# Patient Record
Sex: MALE | Race: BLACK OR AFRICAN AMERICAN | NOT HISPANIC OR LATINO | Employment: OTHER | URBAN - METROPOLITAN AREA
[De-identification: names, ages, dates, MRNs, and addresses within clinical notes are randomized per-mention and may not be internally consistent; named-entity substitution may affect disease eponyms.]

---

## 2017-05-11 DIAGNOSIS — M54.50 CHRONIC MIDLINE LOW BACK PAIN WITHOUT SCIATICA: Primary | ICD-10-CM

## 2017-05-11 DIAGNOSIS — G89.29 CHRONIC MIDLINE LOW BACK PAIN WITHOUT SCIATICA: Primary | ICD-10-CM

## 2017-05-11 RX ORDER — IBUPROFEN 800 MG/1
800 TABLET ORAL DAILY PRN
Qty: 90 TABLET | Refills: 1 | Status: SHIPPED | OUTPATIENT
Start: 2017-05-11 | End: 2017-05-11

## 2017-05-11 RX ORDER — IBUPROFEN 800 MG/1
800 TABLET ORAL DAILY PRN
COMMUNITY
Start: 2016-12-02 | End: 2017-05-11 | Stop reason: SDUPTHER

## 2017-05-11 RX ORDER — IBUPROFEN 800 MG/1
800 TABLET ORAL DAILY PRN
Qty: 90 TABLET | Refills: 1 | Status: SHIPPED | OUTPATIENT
Start: 2017-05-11 | End: 2017-05-26 | Stop reason: SDUPTHER

## 2017-05-26 RX ORDER — METFORMIN HYDROCHLORIDE 500 MG/1
1 TABLET ORAL DAILY
COMMUNITY
Start: 2016-12-02 | End: 2017-06-11 | Stop reason: SDUPTHER

## 2017-05-26 RX ORDER — LORATADINE 10 MG/1
1 TABLET ORAL DAILY
COMMUNITY
Start: 2016-12-02 | End: 2017-11-16 | Stop reason: SDUPTHER

## 2017-05-26 RX ORDER — ASPIRIN 81 MG/1
1 TABLET ORAL DAILY
COMMUNITY
Start: 2016-08-02

## 2017-05-26 RX ORDER — LISINOPRIL 20 MG/1
1 TABLET ORAL DAILY
COMMUNITY
Start: 2016-12-02 | End: 2017-06-11 | Stop reason: SDUPTHER

## 2017-05-26 RX ORDER — ATORVASTATIN CALCIUM 10 MG/1
1 TABLET, FILM COATED ORAL DAILY
COMMUNITY
Start: 2016-12-02 | End: 2017-06-11 | Stop reason: SDUPTHER

## 2017-05-26 RX ORDER — HYDROGEN PEROXIDE 3 %
1 SOLUTION, NON-ORAL MISCELLANEOUS DAILY
COMMUNITY
Start: 2016-12-02 | End: 2017-06-11 | Stop reason: SDUPTHER

## 2017-05-26 RX ORDER — NIFEDIPINE 90 MG/1
1 TABLET, FILM COATED, EXTENDED RELEASE ORAL DAILY
COMMUNITY
Start: 2016-12-02 | End: 2017-06-11 | Stop reason: SDUPTHER

## 2017-05-26 RX ORDER — IBUPROFEN 800 MG/1
1 TABLET ORAL DAILY PRN
COMMUNITY
Start: 2016-12-02 | End: 2017-11-07 | Stop reason: SDUPTHER

## 2017-05-26 RX ORDER — SILDENAFIL 100 MG/1
1 TABLET, FILM COATED ORAL DAILY PRN
COMMUNITY
Start: 2016-08-02 | End: 2017-07-06 | Stop reason: SDUPTHER

## 2017-06-11 RX ORDER — METFORMIN HYDROCHLORIDE 500 MG/1
TABLET ORAL
Qty: 90 TABLET | Refills: 2 | Status: SHIPPED | OUTPATIENT
Start: 2017-06-11 | End: 2018-03-09 | Stop reason: SDUPTHER

## 2017-06-11 RX ORDER — NIFEDIPINE 90 MG/1
TABLET, FILM COATED, EXTENDED RELEASE ORAL
Qty: 90 TABLET | Refills: 2 | Status: SHIPPED | OUTPATIENT
Start: 2017-06-11 | End: 2018-03-09 | Stop reason: SDUPTHER

## 2017-06-11 RX ORDER — LISINOPRIL 20 MG/1
TABLET ORAL
Qty: 90 TABLET | Refills: 2 | Status: SHIPPED | OUTPATIENT
Start: 2017-06-11 | End: 2018-03-09 | Stop reason: SDUPTHER

## 2017-06-11 RX ORDER — ATORVASTATIN CALCIUM 10 MG/1
TABLET, FILM COATED ORAL
Qty: 90 TABLET | Refills: 2 | Status: SHIPPED | OUTPATIENT
Start: 2017-06-11 | End: 2018-03-09 | Stop reason: SDUPTHER

## 2017-06-11 RX ORDER — HYDROGEN PEROXIDE 3 %
20 SOLUTION, NON-ORAL MISCELLANEOUS DAILY
Qty: 90 CAPSULE | Refills: 3 | Status: SHIPPED | OUTPATIENT
Start: 2017-06-11 | End: 2017-09-15 | Stop reason: CLARIF

## 2017-06-11 RX ORDER — ESOMEPRAZOLE MAGNESIUM 20 MG
CAPSULE,DELAYED RELEASE (ENTERIC COATED) ORAL
Qty: 90 CAPSULE | Refills: 2 | Status: CANCELLED | OUTPATIENT
Start: 2017-06-11

## 2017-06-29 LAB — HBA1C MFR BLD: 6.1 % (ref 3.1–6.5)

## 2017-07-04 PROBLEM — N52.9 ED (ERECTILE DYSFUNCTION) OF ORGANIC ORIGIN: Status: ACTIVE | Noted: 2017-07-04

## 2017-07-04 PROBLEM — R73.01 IMPAIRED FASTING GLUCOSE: Status: ACTIVE | Noted: 2017-07-04

## 2017-07-04 PROBLEM — G89.29 CHRONIC LOW BACK PAIN: Status: ACTIVE | Noted: 2017-07-04

## 2017-07-04 PROBLEM — E78.00 HYPERCHOLESTEROLEMIA: Status: ACTIVE | Noted: 2017-07-04

## 2017-07-04 PROBLEM — M54.50 CHRONIC LOW BACK PAIN: Status: ACTIVE | Noted: 2017-07-04

## 2017-07-04 PROBLEM — R68.82 REDUCED LIBIDO: Status: ACTIVE | Noted: 2017-07-04

## 2017-07-04 PROBLEM — E66.3 OVERWEIGHT: Status: ACTIVE | Noted: 2017-07-04

## 2017-07-04 PROBLEM — K21.9 GASTROESOPHAGEAL REFLUX DISEASE WITHOUT ESOPHAGITIS: Status: ACTIVE | Noted: 2017-07-04

## 2017-07-04 PROBLEM — I10 BENIGN ESSENTIAL HYPERTENSION: Status: ACTIVE | Noted: 2017-07-04

## 2017-07-04 PROBLEM — J30.1 HAY FEVER: Status: ACTIVE | Noted: 2017-07-04

## 2017-07-04 PROBLEM — F17.200 CURRENT SMOKER: Status: ACTIVE | Noted: 2017-07-04

## 2017-07-04 PROBLEM — Z13.89 ENCOUNTER FOR SCREENING FOR OTHER DISORDER: Status: ACTIVE | Noted: 2017-07-04

## 2017-07-04 PROBLEM — M51.369 DEGENERATION OF INTERVERTEBRAL DISC OF LUMBAR REGION: Status: ACTIVE | Noted: 2017-07-04

## 2017-07-04 PROBLEM — M51.36 DEGENERATION OF INTERVERTEBRAL DISC OF LUMBAR REGION: Status: ACTIVE | Noted: 2017-07-04

## 2017-07-06 ENCOUNTER — OFFICE VISIT (OUTPATIENT)
Dept: FAMILY MEDICINE | Facility: CLINIC | Age: 62
End: 2017-07-06
Payer: OTHER GOVERNMENT

## 2017-07-06 VITALS
DIASTOLIC BLOOD PRESSURE: 80 MMHG | HEIGHT: 72 IN | SYSTOLIC BLOOD PRESSURE: 121 MMHG | WEIGHT: 219 LBS | HEART RATE: 91 BPM | BODY MASS INDEX: 29.66 KG/M2

## 2017-07-06 DIAGNOSIS — I10 BENIGN ESSENTIAL HYPERTENSION: ICD-10-CM

## 2017-07-06 DIAGNOSIS — E78.00 HYPERCHOLESTEROLEMIA: ICD-10-CM

## 2017-07-06 DIAGNOSIS — Z29.89 NEED FOR MALARIA PROPHYLAXIS: ICD-10-CM

## 2017-07-06 DIAGNOSIS — N52.9 ERECTILE DYSFUNCTION, UNSPECIFIED ERECTILE DYSFUNCTION TYPE: ICD-10-CM

## 2017-07-06 DIAGNOSIS — Z71.85 IMMUNIZATION COUNSELING: Primary | ICD-10-CM

## 2017-07-06 PROCEDURE — 99214 OFFICE O/P EST MOD 30 MIN: CPT | Mod: ,,, | Performed by: INTERNAL MEDICINE

## 2017-07-06 RX ORDER — DOXYCYCLINE 100 MG/1
100 CAPSULE ORAL DAILY
Qty: 37 CAPSULE | Refills: 0 | Status: SHIPPED | OUTPATIENT
Start: 2017-07-06 | End: 2018-05-08

## 2017-07-06 RX ORDER — SILDENAFIL 100 MG/1
100 TABLET, FILM COATED ORAL DAILY PRN
Qty: 24 TABLET | Refills: 3 | Status: SHIPPED | OUTPATIENT
Start: 2017-07-06 | End: 2018-09-19 | Stop reason: SDUPTHER

## 2017-07-06 RX ORDER — ATOVAQUONE AND PROGUANIL HYDROCHLORIDE 250; 100 MG/1; MG/1
1 TABLET, FILM COATED ORAL DAILY
Qty: 16 TABLET | Refills: 0 | Status: SHIPPED | OUTPATIENT
Start: 2017-07-06 | End: 2018-09-23

## 2017-07-06 NOTE — PATIENT INSTRUCTIONS
Taking Your Blood Pressure  Blood pressure is the force of blood against the artery wall as it moves from the heart through the blood vessels. You can take your own blood pressure reading using a digital monitor. Take readings as often as your healthcare provider instructs. Take each reading at the same time of day.  Step 1. Relax    · Take your blood pressure at the same time every day, such as in the morning or evening, or at the time your healthcare provider recommends.  · Wait at least a half-hour after smoking, eating, drinking caffeinated beverages, or exercising.  · Sit comfortably at a table with both feet on the floor. Do not cross your legs or feet. Place the monitor near you.  · Rest for a few minutes before you begin.  Step 2. Wrap the cuff    · Place your arm on the table, palm up. Your arm should be at the level of your heart. Wrap the cuff around your upper arm, just above your elbow. Its best done on bare skin, not over clothing. Most cuffs will indicate where the brachial artery (the blood vessel in the middle of the arm at the inner side of the elbow) should line up with the cuff. Look in your monitor's instruction booklet for an illustration. You can also bring your cuff to your healthcare provider and have them show you how to correctly place the cuff.  · Make sure your cuff fits. If it doesnt wrap around your upper arm, order a larger cuff.  Step 3. Inflate the cuff    · Pump the cuff until the scale reads 160. If you have a self-inflating cuff, push the button that starts the pump.  · The cuff will tighten, then loosen.  · The numbers will change. When they stop changing, your blood pressure reading will appear.  · Take 2 or 3 readings one minute apart.  Step 4. Write down the results of each reading    · Write down your blood pressure numbers for each reading. Note the date and time. Keep your results in one place, such as a notebook. Even if your monitor has a built-in memory, keep a hard  copy of the readings.  · Remove the cuff from your arm. Turn off the machine.  · Share your blood pressure records with your healthcare providers at each visit.  Date Last Reviewed: 4/27/2016  © 8108-6466 [employer]. [address] [place] [address], [place] [address]. All rights reserved. This information is not intended as a substitute for professional medical care. Always follow your healthcare professional's instructions.

## 2017-07-06 NOTE — PROGRESS NOTES
Subjective:       Patient ID: Cipriano Gunderson is a 62 y.o. male.    Chief Complaint: Hyperlipidemia (lab review) and Hypertension    Mr. 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 He is interested in immunizations and preventive care.    During his  service he was updated on hepatitis A and B  vaccinations. He is also updated on tetanus diphtheria vaccination.    I've reviewed CDC web site and the Prophylaxis been recommended for malaria prevention and typhoid. He is going to stay in [facility] for 1 week.     Usual precautions including avoiding unsanitary food  and drinking bottled water has been Recommended.      Hypertension   This is a chronic problem. The current episode started more than 1 year ago. The problem has been gradually improving since onset. The problem is controlled. Pertinent negatives include no chest pain, palpitations or shortness of breath. Past treatments include calcium channel blockers and ACE inhibitors. The current treatment provides significant improvement. There are no compliance problems.  There is no history of angina, heart failure, PVD or renovascular disease.   Hyperlipidemia   This is a chronic problem. The current episode started more than 1 year ago. Pertinent negatives include no chest pain or shortness of breath. Current antihyperlipidemic treatment includes statins. Risk factors for coronary artery disease include male sex.   Erectile Dysfunction   This is a chronic problem. The current episode started more than 1 year ago. He reports no anxiety. Pertinent negatives include no dysuria or hematuria. Past treatments include sildenafil. The treatment provided moderate relief. He has had no adverse reactions caused by medications. There are no known risk factors.       Past Medical History:   Diagnosis Date    Depression     Diabetes mellitus, type 2     GERD (gastroesophageal reflux disease)      Hyperlipidemia     Hypertension      Social History     Social History    Marital status:      Spouse name: N/A    Number of children: N/A    Years of education: N/A     Occupational History    Not on file.     Social History Main Topics    Smoking status: Current Some Day Smoker     Types: Cigars    Smokeless tobacco: Never Used    Alcohol use Yes    Drug use: No    Sexual activity: Yes     Partners: Female     Other Topics Concern    Not on file     Social History Narrative    No narrative on file     Past Surgical History:   Procedure Laterality Date    HERNIA REPAIR      NASAL SEPTUM SURGERY       Family History   Problem Relation Age of Onset    Hypertension Mother     Cancer Mother     Hypertension Father     Heart disease Father        Review of Systems   Constitutional: Negative for activity change, appetite change, fatigue and unexpected weight change.   HENT: Negative for congestion, sneezing and trouble swallowing.    Eyes: Negative for pain and visual disturbance.   Respiratory: Negative for cough, chest tightness and shortness of breath.    Cardiovascular: Negative for chest pain, palpitations and leg swelling.        Patient has hypertension and dyslipidemia.   Gastrointestinal: Negative for abdominal distention, abdominal pain, blood in stool, constipation and diarrhea.   Endocrine: Negative for cold intolerance, heat intolerance, polydipsia, polyphagia and polyuria.   Genitourinary: Negative for dysuria, hematuria and scrotal swelling.        Erectile dysfunction   Musculoskeletal: Positive for back pain (Chronic back pain). Negative for arthralgias and gait problem.   Skin: Negative for pallor, rash and wound.   Allergic/Immunologic: Negative for environmental allergies, food allergies and immunocompromised state.   Neurological: Negative for dizziness, seizures, speech difficulty, light-headedness and numbness.   Hematological: Negative for adenopathy. Does not bruise/bleed  easily.   Psychiatric/Behavioral: Negative for agitation, behavioral problems and confusion. The patient is not nervous/anxious.        Objective:       Vitals:    07/06/17 0930   BP: 121/80   Pulse: 91   Weight: 99.3 kg (219 lb)   Height: 6' (1.829 m)     Physical Exam   Constitutional: He is oriented to person, place, and time. He appears well-developed.   HENT:   Head: Normocephalic and atraumatic.   Mouth/Throat: No oropharyngeal exudate.   Eyes: Conjunctivae and EOM are normal.   Neck: Normal range of motion. Neck supple. No JVD present. No tracheal deviation present. No thyromegaly present.   Cardiovascular: Normal rate, regular rhythm and normal heart sounds.  Exam reveals no gallop and no friction rub.    No murmur heard.  Pulmonary/Chest: Effort normal and breath sounds normal. No respiratory distress. He has no wheezes. He has no rales.   Abdominal: Soft. Bowel sounds are normal. He exhibits no distension. There is no tenderness.   Musculoskeletal: Normal range of motion.   Neurological: He is alert and oriented to person, place, and time.   Skin: Skin is warm and dry.   Psychiatric: He has a normal mood and affect.   Nursing note and vitals reviewed.      Assessment:       1. Immunization counseling    2. Need for malaria prophylaxis    3. Hypercholesterolemia    4. Benign essential hypertension    5. Erectile dysfunction, unspecified erectile dysfunction type         Plan:           Immunization counseling  -     typhoid (VIVOTIF) DR capsule; Take 1 capsule by mouth every other day.  Dispense: 4 capsule; Refill: 0    Need for malaria prophylaxis  -     doxycycline (MONODOX) 100 MG capsule; Take 1 capsule (100 mg total) by mouth once daily.  Dispense: 37 capsule; Refill: 0  -     atovaquone-proguanil (MALARONE) 250-100 mg Tab; Take 1 tablet by mouth once daily.  Dispense: 16 tablet; Refill: 0    Hypercholesterolemia    Benign essential hypertension    Erectile dysfunction, unspecified erectile dysfunction  type  -     sildenafil (VIAGRA) 100 MG tablet; Take 1 tablet (100 mg total) by mouth daily as needed.  Dispense: 24 tablet; Refill: 3    Patient has been advised to watch diet and exercise. Avoid fried and fatty food. Compliance to medications and follow up urged.    Patient can use one of the medications for malaria prophylaxis

## 2017-08-28 ENCOUNTER — OFFICE VISIT (OUTPATIENT)
Dept: FAMILY MEDICINE | Facility: CLINIC | Age: 62
End: 2017-08-28
Payer: OTHER GOVERNMENT

## 2017-08-28 VITALS
WEIGHT: 215 LBS | SYSTOLIC BLOOD PRESSURE: 96 MMHG | HEIGHT: 72 IN | BODY MASS INDEX: 29.12 KG/M2 | DIASTOLIC BLOOD PRESSURE: 71 MMHG | HEART RATE: 104 BPM

## 2017-08-28 DIAGNOSIS — N40.0 BENIGN PROSTATIC HYPERPLASIA, PRESENCE OF LOWER URINARY TRACT SYMPTOMS UNSPECIFIED: Primary | ICD-10-CM

## 2017-08-28 DIAGNOSIS — R30.0 DYSURIA: ICD-10-CM

## 2017-08-28 LAB
BACTERIA SPEC CULT: NORMAL
RBC # BLD AUTO: NORMAL /HPF
SQUAMOUS EPITHELIAL, UA: NORMAL
WBC # BLD: NORMAL /HPF

## 2017-08-28 PROCEDURE — 3008F BODY MASS INDEX DOCD: CPT | Mod: ,,, | Performed by: INTERNAL MEDICINE

## 2017-08-28 PROCEDURE — 99213 OFFICE O/P EST LOW 20 MIN: CPT | Mod: ,,, | Performed by: INTERNAL MEDICINE

## 2017-08-28 RX ORDER — TAMSULOSIN HYDROCHLORIDE 0.4 MG/1
0.4 CAPSULE ORAL DAILY
Qty: 30 CAPSULE | Refills: 11 | Status: SHIPPED | OUTPATIENT
Start: 2017-08-28 | End: 2017-10-09 | Stop reason: SDUPTHER

## 2017-08-28 NOTE — PATIENT INSTRUCTIONS
Dysuria with Uncertain Cause (Adult)    The urethra is the tube that allows urine to pass out of the body. In a woman, the urethra is the opening above the vagina. In men, the urethra is the opening on the tip of the penis. Dysuria is the feeling of pain or burning in the urethra when passing urine.  Dysuria can be caused by anything that irritates or inflames the urethra. An infection or chemical irritation can cause this reaction. A bladder infection is the most common cause of dysuria in adults. A urine test can diagnose this. A bladder infection needs antibiotic treatment.  Soaps, lotions, colognes and feminine hygiene products can cause dysuria. So can birth control jellies, creams, and foams. It will go away 1 to 3 days after using these irritants.  Sexually transmitted diseases (STDs) such as chlamydia or gonorrhea can cause dysuria. Your healthcare provider may take a culture sample. Your provider may start you on antibiotic medicine before the culture test returns.  In women who have gone through menopause, dysuria can be from dryness in the lining of the urethra. This can be treated with hormones. Dysuria becomes long-term (chronic) when it lasts for weeks or months. You may need to see a specialist (urologist) to diagnose and treat chronic dysuria.  Home care  These home care tips may help:  · Don't use any chemicals or products that you think may be causing your symptoms.  · If you were given a prescription medicine, take as directed. Be sure to take it until it is all used up.  · If a culture was taken, don't have sex until you have been told that it is negative. This means you don't have an infection. Then follow your healthcare provider's advice to treat your condition.  If a culture was done and it is positive:  · Both you and your sexual partner may need to be treated. This is true even if your partner has no symptoms.  · Contact your healthcare provider or go to an urgent care clinic or the  public health department to be looked at and treated.  · Don't have sex until both you and your partner(s) have finished all antibiotics and your healthcare provider says you are no longer contagious.  · Learn about and use safe sex practices. The safest sex is with a partner who has tested negative and only has sex with you. Condoms can prevent STDs from spreading, but they aren't a guarantee.  Follow-up care  Follow up with your healthcare provider, or as advised. If a culture was taken, you may call as directed for the results. If you have an STD, follow up with your provider or the public health department for a complete STD screening, including HIV testing. For more information, contact CDC-INFO at 762-971-3464.  When to seek medical advice  Call your healthcare provider right away if any of these occur:  · You aren't better after 3 days of treatment  · Fever of 100.4ºF (38ºC) or higher, or as directed by your healthcare provider  · Back or belly pain that gets worse  · You can't urinate because of pain  · New discharge from the urethra, vagina, or penis  · Painful sores on the penis  · Rash or joint pain  · Painful lumps (lymph nodes) in the groin  · Testicle pain or swelling of the scrotum  Date Last Reviewed: 11/1/2016 © 2000-2016 [employer] [address] [place] [address]. All rights reserved. This information is not intended as a substitute for professional medical care. Always follow your healthcare professional's instructions.        Prostate Anatomy  The prostate gland is part of the male reproductive system. The prostate is located below the bladder. It surrounds the urethra (the tube that carries urine and semen out of the body). The function of the prostate is to produce fluid. This fluid mixes with fluid from the seminal vesicles and sperm from the testicles to form semen. During ejaculation, semen travels through the urethra and out of the penis. Prostate health is  closely linked to hormones (chemicals that carry messages throughout the body). Normal levels of hormones, such as testosterone, keep the prostate working correctly.    Date Last Reviewed: 8/27/2014  © 0631-2314 [employer]. [address] [place] [address], [place] [address]. All rights reserved. This information is not intended as a substitute for professional medical care. Always follow your healthcare professional's instructions.

## 2017-08-28 NOTE — PROGRESS NOTES
Subjective:       Patient ID: Cipriano Gundersno is a 62 y.o. male.    Chief Complaint: Urinary Tract Infection    Patient has some itching in his private areas and glans penis. He is in a monogamous relationship and does not use any spermicides condoms. He circumcised. He does admit to some hesitancy and frequency. He gets up at 1 time at night to pass urine. He does drink 2 or 3 cups of coffee a day.    I'll urine analysis had been unremarkable. He circumcised and points out to slight plaque-like thickening in the glans penis.      Urinary Tract Infection    This is a new problem. The current episode started more than 1 month ago. The problem occurs intermittently. Associated symptoms include frequency. Pertinent negatives include no chills.       Past Medical History:   Diagnosis Date    Depression     Diabetes mellitus, type 2     GERD (gastroesophageal reflux disease)     Hyperlipidemia     Hypertension      Social History     Social History    Marital status:      Spouse name: N/A    Number of children: N/A    Years of education: N/A     Occupational History    Not on file.     Social History Main Topics    Smoking status: Current Some Day Smoker     Types: Cigars    Smokeless tobacco: Never Used    Alcohol use Yes    Drug use: No    Sexual activity: Yes     Partners: Female     Other Topics Concern    Not on file     Social History Narrative    No narrative on file     Past Surgical History:   Procedure Laterality Date    HERNIA REPAIR      NASAL SEPTUM SURGERY       Family History   Problem Relation Age of Onset    Hypertension Mother     Cancer Mother     Hypertension Father     Heart disease Father        Review of Systems   Constitutional: Negative for activity change, appetite change, chills, diaphoresis, fatigue and fever.   HENT: Negative for congestion, drooling, facial swelling and mouth sores.    Eyes: Negative for pain, discharge and itching.   Respiratory: Negative for  cough, chest tightness and stridor.    Cardiovascular: Negative for chest pain, palpitations and leg swelling.   Gastrointestinal: Negative for abdominal distention, abdominal pain and anal bleeding.   Endocrine: Negative for polydipsia.   Genitourinary: Positive for difficulty urinating and frequency. Negative for penile pain, penile swelling, scrotal swelling and testicular pain.        Itching in penis   Neurological: Negative for dizziness and numbness.       Objective:       Vitals:    08/28/17 1532   BP: 96/71   Pulse: 104   Weight: 97.5 kg (215 lb)   Height: 6' (1.829 m)     Physical Exam   Constitutional: He appears well-developed and well-nourished.   Cardiovascular: Normal rate and regular rhythm.    Pulmonary/Chest: Effort normal and breath sounds normal.   Genitourinary: Circumcised. No phimosis, paraphimosis, hypospadias, penile erythema or penile tenderness. No discharge found.   Genitourinary Comments: Patient points out to slightly darker pigmentation and thickening in the glans penis. I do not see any obvious papules or evidence of balanitis or balanoposthitis.       Assessment:       1. Benign prostatic hyperplasia, presence of lower urinary tract symptoms unspecified    2. Dysuria         Plan:           Benign prostatic hyperplasia, presence of lower urinary tract symptoms unspecified  -     tamsulosin (FLOMAX) 0.4 mg Cp24; Take 1 capsule (0.4 mg total) by mouth once daily.  Dispense: 30 capsule; Refill: 11    Dysuria  -     Urinalysis; Future    Patient's main issue seems to be itching on the glans penis and some hyperpigmentation. I do not see any obvious evidence of enteritis or balanoposthitis. He is circumcised. He does have prostate symptoms with hesitancy and poor stream. He will also try Flomax and see how he does.    He should watch his blood pressures. We may have to stop or cut down on nifedipine. Next    Check urinalysis again. Next    We may need a urology/dermatology consultation  for itching on the glans penis and textural changes. Next    Follow-up in one month.    I've asked the patient to cut down on caffeine also.

## 2017-09-05 DIAGNOSIS — N42.82 PROSTATITIS SYNDROME: Primary | ICD-10-CM

## 2017-09-05 RX ORDER — CIPROFLOXACIN 500 MG/1
500 TABLET ORAL 2 TIMES DAILY
Qty: 28 TABLET | Refills: 0 | Status: SHIPPED | OUTPATIENT
Start: 2017-09-05 | End: 2017-09-19

## 2017-09-14 ENCOUNTER — TELEPHONE (OUTPATIENT)
Dept: FAMILY MEDICINE | Facility: CLINIC | Age: 62
End: 2017-09-14

## 2017-09-15 DIAGNOSIS — K21.9 GASTROESOPHAGEAL REFLUX DISEASE WITHOUT ESOPHAGITIS: Primary | ICD-10-CM

## 2017-09-15 RX ORDER — PANTOPRAZOLE SODIUM 40 MG/1
40 TABLET, DELAYED RELEASE ORAL DAILY
Qty: 90 TABLET | Refills: 3 | Status: SHIPPED | OUTPATIENT
Start: 2017-09-15 | End: 2017-10-09 | Stop reason: SDUPTHER

## 2017-09-19 ENCOUNTER — PATIENT MESSAGE (OUTPATIENT)
Dept: FAMILY MEDICINE | Facility: CLINIC | Age: 62
End: 2017-09-19

## 2017-10-09 DIAGNOSIS — N40.0 BENIGN PROSTATIC HYPERPLASIA, PRESENCE OF LOWER URINARY TRACT SYMPTOMS UNSPECIFIED: ICD-10-CM

## 2017-10-09 DIAGNOSIS — K21.9 GASTROESOPHAGEAL REFLUX DISEASE WITHOUT ESOPHAGITIS: ICD-10-CM

## 2017-10-09 PROBLEM — Z13.89 ENCOUNTER FOR SCREENING FOR OTHER DISORDER: Status: RESOLVED | Noted: 2017-07-04 | Resolved: 2017-10-09

## 2017-10-09 RX ORDER — TAMSULOSIN HYDROCHLORIDE 0.4 MG/1
0.4 CAPSULE ORAL DAILY
Qty: 90 CAPSULE | Refills: 3 | Status: SHIPPED | OUTPATIENT
Start: 2017-10-09 | End: 2021-12-21 | Stop reason: ALTCHOICE

## 2017-10-09 RX ORDER — PANTOPRAZOLE SODIUM 40 MG/1
40 TABLET, DELAYED RELEASE ORAL DAILY
Qty: 90 TABLET | Refills: 3 | Status: SHIPPED | OUTPATIENT
Start: 2017-10-09 | End: 2018-09-19 | Stop reason: SDUPTHER

## 2017-10-27 ENCOUNTER — PATIENT MESSAGE (OUTPATIENT)
Dept: FAMILY MEDICINE | Facility: CLINIC | Age: 62
End: 2017-10-27

## 2017-10-31 DIAGNOSIS — L29.3 ITCHING OF MALE GENITALIA: Primary | ICD-10-CM

## 2017-10-31 RX ORDER — NYSTATIN 100000 U/G
CREAM TOPICAL 2 TIMES DAILY
Qty: 30 G | Refills: 0 | Status: SHIPPED | OUTPATIENT
Start: 2017-10-31 | End: 2018-09-19 | Stop reason: SDUPTHER

## 2017-11-03 DIAGNOSIS — L29.3 ITCHING OF MALE GENITALIA: Primary | ICD-10-CM

## 2017-11-06 ENCOUNTER — PATIENT MESSAGE (OUTPATIENT)
Dept: FAMILY MEDICINE | Facility: CLINIC | Age: 62
End: 2017-11-06

## 2017-11-07 DIAGNOSIS — G89.29 CHRONIC MIDLINE LOW BACK PAIN WITHOUT SCIATICA: ICD-10-CM

## 2017-11-07 DIAGNOSIS — M54.50 CHRONIC MIDLINE LOW BACK PAIN WITHOUT SCIATICA: ICD-10-CM

## 2017-11-07 RX ORDER — IBUPROFEN 800 MG/1
TABLET ORAL
Qty: 90 TABLET | Refills: 1 | Status: SHIPPED | OUTPATIENT
Start: 2017-11-07 | End: 2018-05-06 | Stop reason: SDUPTHER

## 2017-11-16 RX ORDER — LORATADINE 10 MG/1
TABLET ORAL
Qty: 90 TABLET | Refills: 1 | Status: SHIPPED | OUTPATIENT
Start: 2017-11-16 | End: 2018-05-15 | Stop reason: SDUPTHER

## 2018-01-09 ENCOUNTER — OFFICE VISIT (OUTPATIENT)
Dept: FAMILY MEDICINE | Facility: CLINIC | Age: 63
End: 2018-01-09
Payer: OTHER GOVERNMENT

## 2018-01-09 VITALS
HEART RATE: 92 BPM | BODY MASS INDEX: 29.93 KG/M2 | HEIGHT: 72 IN | DIASTOLIC BLOOD PRESSURE: 75 MMHG | SYSTOLIC BLOOD PRESSURE: 134 MMHG | WEIGHT: 221 LBS

## 2018-01-09 DIAGNOSIS — E78.00 HYPERCHOLESTEROLEMIA: ICD-10-CM

## 2018-01-09 DIAGNOSIS — I10 BENIGN ESSENTIAL HYPERTENSION: Primary | ICD-10-CM

## 2018-01-09 DIAGNOSIS — Z11.59 NEED FOR HEPATITIS C SCREENING TEST: ICD-10-CM

## 2018-01-09 PROCEDURE — 99213 OFFICE O/P EST LOW 20 MIN: CPT | Mod: ,,, | Performed by: INTERNAL MEDICINE

## 2018-01-09 RX ORDER — HYDROCORTISONE VALERATE 2 MG/G
1 OINTMENT TOPICAL DAILY
COMMUNITY
Start: 2017-11-22

## 2018-01-09 NOTE — PROGRESS NOTES
Subjective:       Patient ID: Cipriano Gunderson is a 62 y.o. male.    Chief Complaint: Hypertension and Hyperlipidemia    Hypertension   This is a chronic problem. The current episode started more than 1 year ago. The problem has been gradually improving since onset. Pertinent negatives include no anxiety, chest pain, malaise/fatigue, palpitations or shortness of breath. Past treatments include ACE inhibitors and calcium channel blockers. The current treatment provides moderate improvement. There is no history of a hypertension causing med or pheochromocytoma.   Hyperlipidemia   This is a chronic problem. The current episode started more than 1 year ago. Pertinent negatives include no chest pain, myalgias or shortness of breath. Current antihyperlipidemic treatment includes statins. Risk factors for coronary artery disease include hypertension, male sex and dyslipidemia.       Past Medical History:   Diagnosis Date    Depression     Diabetes mellitus, type 2     GERD (gastroesophageal reflux disease)     Hyperlipidemia     Hypertension      Social History     Social History    Marital status:      Spouse name: N/A    Number of children: N/A    Years of education: N/A     Occupational History    Not on file.     Social History Main Topics    Smoking status: Current Some Day Smoker     Types: Cigars    Smokeless tobacco: Never Used    Alcohol use Yes    Drug use: No    Sexual activity: Yes     Partners: Female     Other Topics Concern    Not on file     Social History Narrative    No narrative on file     Past Surgical History:   Procedure Laterality Date    HERNIA REPAIR      NASAL SEPTUM SURGERY       Family History   Problem Relation Age of Onset    Hypertension Mother     Cancer Mother     Hypertension Father     Heart disease Father        Review of Systems   Constitutional: Negative for activity change, appetite change, chills, diaphoresis, fatigue, fever and malaise/fatigue.   HENT:  Negative for congestion, drooling, facial swelling and mouth sores.    Eyes: Negative for pain, discharge and itching.   Respiratory: Negative for cough, chest tightness, shortness of breath and stridor.    Cardiovascular: Negative for chest pain, palpitations and leg swelling.        Underlying stable hypertension   Gastrointestinal: Negative for abdominal distention, abdominal pain and anal bleeding.   Endocrine: Negative for polydipsia.        Dyslipidemia   Genitourinary: Negative for difficulty urinating, frequency, penile pain, penile swelling, scrotal swelling and testicular pain.        Itching in penis   Musculoskeletal: Negative for myalgias.   Skin: Positive for rash.        Rash on the glans penis. Seen by dermatologist and prescribed hydrocortisone cream.   Neurological: Negative for dizziness and numbness.       Objective:       Vitals:    01/09/18 0858   BP: 134/75   Pulse: 92   Weight: 100.2 kg (221 lb)   Height: 6' (1.829 m)     Physical Exam   Constitutional: He appears well-developed and well-nourished.   HENT:   Head: Normocephalic and atraumatic.   Mouth/Throat: No oropharyngeal exudate.   Eyes: Conjunctivae and EOM are normal.   Neck: Normal range of motion. Neck supple. No JVD present. No tracheal deviation present. No thyromegaly present.   Cardiovascular: Normal rate, regular rhythm and normal heart sounds.  Exam reveals no gallop and no friction rub.    No murmur heard.  Pulmonary/Chest: Effort normal and breath sounds normal. No respiratory distress. He has no wheezes. He has no rales.   Abdominal: Soft. Bowel sounds are normal. There is no tenderness.   Neurological: He is alert.   Skin: Skin is warm and dry.   Psychiatric: He has a normal mood and affect.   Nursing note and vitals reviewed.      Assessment:       1. Benign essential hypertension    2. Hypercholesterolemia    3. Need for hepatitis C screening test         Plan:           Benign essential hypertension  -     Comprehensive  metabolic panel; Future; Expected date: 01/09/2018    Hypercholesterolemia  -     Lipid panel; Future; Expected date: 01/09/2018    Need for hepatitis C screening test  -     Hepatitis C antibody; Future; Expected date: 01/09/2018    Patient has been advised to watch diet and exercise. Avoid fried and fatty food. Compliance to medications and follow up urged.    Screening for hepatitis C. Check labs at follow-up. Continue to monitor blood pressures at home.    Current Outpatient Prescriptions on File Prior to Visit   Medication Sig Dispense Refill    aspirin (ECOTRIN) 81 MG EC tablet Take 1 tablet by mouth Daily.      atorvastatin (LIPITOR) 10 MG tablet TAKE 1 TABLET DAILY 90 tablet 2    atovaquone-proguanil (MALARONE) 250-100 mg Tab Take 1 tablet by mouth once daily. 16 tablet 0    doxycycline (MONODOX) 100 MG capsule Take 1 capsule (100 mg total) by mouth once daily. 37 capsule 0    ibuprofen (ADVIL,MOTRIN) 800 MG tablet TAKE 1 TABLET DAILY WITH A MEAL AS NEEDED FOR BACK PAIN 90 tablet 1    lisinopril (PRINIVIL,ZESTRIL) 20 MG tablet TAKE 1 TABLET DAILY FOR BLOOD PRESSURE 90 tablet 2    loratadine (CLARITIN) 10 mg tablet TAKE 1 TABLET DAILY 90 tablet 1    metformin (GLUCOPHAGE) 500 MG tablet TAKE 1 TABLET DAILY 90 tablet 2    nifedipine (ADALAT CC) 90 MG TbSR TAKE 1 TABLET DAILY 90 tablet 2    nystatin (MYCOSTATIN) cream Apply topically 2 (two) times daily. 30 g 0    pantoprazole (PROTONIX) 40 MG tablet Take 1 tablet (40 mg total) by mouth once daily. 90 tablet 3    sildenafil (VIAGRA) 100 MG tablet Take 1 tablet (100 mg total) by mouth daily as needed. 24 tablet 3    tamsulosin (FLOMAX) 0.4 mg Cp24 Take 1 capsule (0.4 mg total) by mouth once daily. 90 capsule 3     No current facility-administered medications on file prior to visit.

## 2018-01-09 NOTE — PATIENT INSTRUCTIONS
Taking Your Blood Pressure  Blood pressure is the force of blood against the artery wall as it moves from the heart through the blood vessels. You can take your own blood pressure reading using a digital monitor. Take your readings the same each time, using the same arm. Take readings as often as your healthcare provider instructs.  About blood pressure monitors  Blood pressure monitors are designed for certain ages and cases. You can find monitors for older adults, for pregnant women, and for children. Make sure the one you choose is the right one for your age and situation.  The American Heart Association recommends an automatic cuff monitor that fits on your upper arm (bicep). The cuff should fit your arm size. A cuff thats too large or too small will not give an accurate reading. Measure around your upper arm to find your size.  Monitors that attach to your finger or wrist are not as accurate as monitors for your upper arm.  Ask your healthcare provider for help in choosing a monitor. Bring your monitor to your next provider visit if you need help in using it the correct way.  The steps below are general instructions for using an automatic digital monitor.  Step 1. Relax    · Take your blood pressure at the same time every day, such as in the morning or evening, or at the time your healthcare provider recommends.  · Wait at least a half-hour after smoking, eating, or exercising. Don't drink coffee, tea, soda, or other caffeinated beverages before checking your blood pressure.  · Sit comfortably at a table with both feet on the floor. Do not cross your legs or feet. Place the monitor near you.  · Rest for a few minutes before you begin.  Step 2. Wrap the cuff    · Place your arm on the table, palm up. Your arm should be at the level of your heart. Wrap the cuff around your upper arm, just above your elbow. Its best done on bare skin, not over clothing. Most cuffs will indicate where the brachial artery (the  blood vessel in the middle of the arm at the inner side of the elbow) should line up with the cuff. Look in your monitor's instruction booklet for an illustration. You can also bring your cuff to your healthcare provider and have them show you how to correctly place the cuff.  Step 3. Inflate the cuff    · Push the button that starts the pump.  · The cuff will tighten, then loosen.  · The numbers will change. When they stop changing, your blood pressure reading will appear.  · Take 2 or 3 readings one minute apart.  Step 4. Write down the results of each reading    · Write down your blood pressure numbers for each reading. Note the date and time. Keep your results in one place, such as a notebook. Even if your monitor has a built-in memory, keep a hard copy of the readings.  · Remove the cuff from your arm. Turn off the machine.  · Bring your blood pressure records with your healthcare providers at each visit.  · If you start a new blood pressure medicine, note the day you started the new medicine. Also note the day if you change the dose of your medicine. This information goes on your blood pressure recording sheet. This will help your healthcare provider monitor how well the medicine changes are working.  · Ask your healthcare provider what numbers should prompt you to call him or her. Also ask what numbers should prompt you to get help right away.  Date Last Reviewed: 11/1/2016  © 2158-0562 [employer]. [address] [place] [address], [place] [address]. All rights reserved. This information is not intended as a substitute for professional medical care. Always follow your healthcare professional's instructions.

## 2018-03-09 RX ORDER — NIFEDIPINE 90 MG/1
TABLET, FILM COATED, EXTENDED RELEASE ORAL
Qty: 90 TABLET | Refills: 2 | Status: SHIPPED | OUTPATIENT
Start: 2018-03-09 | End: 2018-09-19 | Stop reason: SDUPTHER

## 2018-03-09 RX ORDER — METFORMIN HYDROCHLORIDE 500 MG/1
TABLET ORAL
Qty: 90 TABLET | Refills: 2 | Status: SHIPPED | OUTPATIENT
Start: 2018-03-09 | End: 2018-09-19 | Stop reason: SDUPTHER

## 2018-03-09 RX ORDER — LISINOPRIL 20 MG/1
TABLET ORAL
Qty: 90 TABLET | Refills: 2 | Status: SHIPPED | OUTPATIENT
Start: 2018-03-09 | End: 2018-09-19 | Stop reason: SDUPTHER

## 2018-03-09 RX ORDER — ATORVASTATIN CALCIUM 10 MG/1
TABLET, FILM COATED ORAL
Qty: 90 TABLET | Refills: 2 | Status: SHIPPED | OUTPATIENT
Start: 2018-03-09 | End: 2018-09-19 | Stop reason: SDUPTHER

## 2018-05-02 LAB
ALBUMIN SERPL-MCNC: 4.5 G/DL (ref 3.1–4.7)
ALP SERPL-CCNC: 92 IU/L (ref 40–104)
ALT (SGPT): 38 IU/L (ref 3–33)
AST SERPL-CCNC: 39 IU/L (ref 10–40)
BILIRUB SERPL-MCNC: 0.7 MG/DL (ref 0.3–1)
BUN SERPL-MCNC: 18 MG/DL (ref 8–20)
CALCIUM SERPL-MCNC: 9.3 MG/DL (ref 7.7–10.4)
CHLORIDE: 105 MMOL/L (ref 98–110)
CO2 SERPL-SCNC: 22.1 MMOL/L (ref 22.8–31.6)
CREATININE: 1.17 MG/DL (ref 0.6–1.4)
GLUCOSE: 137 MG/DL (ref 70–99)
POTASSIUM SERPL-SCNC: 4.1 MMOL/L (ref 3.5–5)
PROT SERPL-MCNC: 7.5 G/DL (ref 6–8.2)
SODIUM: 137 MMOL/L (ref 134–144)

## 2018-05-03 PROBLEM — Z11.59 NEED FOR HEPATITIS C SCREENING TEST: Status: ACTIVE | Noted: 2018-05-03

## 2018-05-03 PROBLEM — Z11.59 NEED FOR HEPATITIS C SCREENING TEST: Status: ACTIVE | Noted: 2017-07-04

## 2018-05-03 LAB — HCV AB SERPL QL IA: <0.1 S/CO RATIO (ref 0–0.9)

## 2018-05-06 DIAGNOSIS — M54.50 CHRONIC MIDLINE LOW BACK PAIN WITHOUT SCIATICA: ICD-10-CM

## 2018-05-06 DIAGNOSIS — G89.29 CHRONIC MIDLINE LOW BACK PAIN WITHOUT SCIATICA: ICD-10-CM

## 2018-05-06 RX ORDER — IBUPROFEN 800 MG/1
TABLET ORAL
Qty: 90 TABLET | Refills: 1 | Status: SHIPPED | OUTPATIENT
Start: 2018-05-06 | End: 2018-09-19 | Stop reason: SDUPTHER

## 2018-05-08 ENCOUNTER — OFFICE VISIT (OUTPATIENT)
Dept: FAMILY MEDICINE | Facility: CLINIC | Age: 63
End: 2018-05-08
Payer: OTHER GOVERNMENT

## 2018-05-08 VITALS
BODY MASS INDEX: 32.1 KG/M2 | DIASTOLIC BLOOD PRESSURE: 69 MMHG | HEART RATE: 89 BPM | WEIGHT: 237 LBS | HEIGHT: 72 IN | SYSTOLIC BLOOD PRESSURE: 121 MMHG

## 2018-05-08 DIAGNOSIS — I10 BENIGN ESSENTIAL HYPERTENSION: Primary | ICD-10-CM

## 2018-05-08 DIAGNOSIS — R74.8 ABNORMAL AST AND ALT: ICD-10-CM

## 2018-05-08 DIAGNOSIS — N40.0 BENIGN PROSTATIC HYPERPLASIA, PRESENCE OF LOWER URINARY TRACT SYMPTOMS UNSPECIFIED: ICD-10-CM

## 2018-05-08 DIAGNOSIS — K21.9 GASTROESOPHAGEAL REFLUX DISEASE WITHOUT ESOPHAGITIS: ICD-10-CM

## 2018-05-08 DIAGNOSIS — E78.00 HYPERCHOLESTEROLEMIA: ICD-10-CM

## 2018-05-08 DIAGNOSIS — R73.01 IMPAIRED FASTING GLUCOSE: ICD-10-CM

## 2018-05-08 PROCEDURE — 99214 OFFICE O/P EST MOD 30 MIN: CPT | Mod: ,,, | Performed by: INTERNAL MEDICINE

## 2018-05-08 NOTE — PROGRESS NOTES
Subjective:       Patient ID: Cipriano Gunderson is a 63 y.o. male.    Chief Complaint: Hypertension (lab review ); Hyperlipidemia; Gastroesophageal Reflux; Erectile Dysfunction; and Benign Prostatic Hypertrophy    Mr. Cipriano Gunderson is a 63-year-old -American male who comes for follow-up. His underlying medical issues include hypertension, hyperlipidemia, gastroesophageal reflux, erectile dysfunction and benign prostatic hypertrophy symptoms.    I've taken the opportunity to review his recent labs and his hepatitis C screening is negative. His blood sugar is greater than 130. ALT slightly elevated. He does admit that one day before testing he had drank a fifth of a wine bottle. He also had a crawfish boil which might explain his elevated blood sugar. Thus far I have not formally diagnosed with diabetes mellitus.    He goes to the gym and regularly exercises. He has gained some weight which he attributes to muscle gain.    Reflux symptoms are stable. Erectile dysfunction symptoms are stable. Blood pressures are mostly under control at home. Lipid panel was excellent.      Hypertension   This is a chronic problem. The current episode started more than 1 year ago. The problem is controlled. Pertinent negatives include no chest pain, malaise/fatigue, palpitations, peripheral edema, shortness of breath or sweats. Risk factors for coronary artery disease include male gender and dyslipidemia (Patient BMI is 32 but this is mostly muscular.). Past treatments include calcium channel blockers and ACE inhibitors. There is no history of hyperaldosteronism, hypercortisolism, a hypertension causing med, pheochromocytoma or renovascular disease.   Hyperlipidemia   This is a chronic problem. The current episode started more than 1 year ago. The problem is controlled. Pertinent negatives include no chest pain, myalgias or shortness of breath. Current antihyperlipidemic treatment includes statins. Risk factors for coronary artery  disease include male sex, hypertension and dyslipidemia.   Gastroesophageal Reflux   He complains of heartburn. He reports no abdominal pain, no chest pain or no coughing. This is a chronic problem. The current episode started more than 1 year ago. The problem has been gradually improving. The symptoms are aggravated by certain foods and ETOH. Pertinent negatives include no fatigue. He has tried a PPI for the symptoms. The treatment provided moderate relief. Past invasive treatments do not include gastroplasty, gastroplication or reflux surgery.   Erectile Dysfunction   This is a chronic problem. The problem is unchanged. The nature of his difficulty is achieving erection, maintaining erection and penetration. He reports no decreased libido. Irritative symptoms do not include frequency. Pertinent negatives include no chills. Past treatments include sildenafil. The treatment provided moderate relief.   Benign Prostatic Hypertrophy   This is a chronic problem. The current episode started more than 1 year ago. The problem has been gradually improving since onset. Irritative symptoms do not include frequency. Pertinent negatives include no chills. Past treatments include tamsulosin. The treatment provided moderate relief.       Past Medical History:   Diagnosis Date    Depression     Diabetes mellitus, type 2     GERD (gastroesophageal reflux disease)     Hyperlipidemia     Hypertension      Social History     Social History    Marital status:      Spouse name: N/A    Number of children: N/A    Years of education: N/A     Occupational History    Not on file.     Social History Main Topics    Smoking status: Current Some Day Smoker     Types: Cigars    Smokeless tobacco: Never Used    Alcohol use Yes    Drug use: No    Sexual activity: Yes     Partners: Female     Other Topics Concern    Not on file     Social History Narrative    No narrative on file     Past Surgical History:   Procedure  Laterality Date    HERNIA REPAIR      NASAL SEPTUM SURGERY       Family History   Problem Relation Age of Onset    Hypertension Mother     Cancer Mother     Hypertension Father     Heart disease Father        Review of Systems   Constitutional: Negative for activity change, appetite change, chills, diaphoresis, fatigue, fever and malaise/fatigue.   HENT: Negative for congestion, drooling, facial swelling and mouth sores.    Eyes: Negative for pain, discharge and itching.   Respiratory: Negative for cough, chest tightness, shortness of breath and stridor.    Cardiovascular: Negative for chest pain, palpitations and leg swelling.        Underlying stable hypertension   Gastrointestinal: Positive for heartburn. Negative for abdominal distention, abdominal pain and anal bleeding.        Stable reflux symptoms.   Endocrine: Negative for polydipsia.        Dyslipidemia-elevated blood sugar on metformin   Genitourinary: Negative for decreased libido, difficulty urinating, frequency, penile pain, penile swelling, scrotal swelling and testicular pain.        Itching in penis   Musculoskeletal: Positive for arthralgias. Negative for myalgias.        Hand and wrist pains. No obvious swelling noted.   Skin: Positive for rash.        Rash on the glans penis. Seen by dermatologist and prescribed hydrocortisone cream.   Neurological: Negative for dizziness and numbness.   Hematological: Negative for adenopathy.   Psychiatric/Behavioral: Negative for agitation, confusion and dysphoric mood.       Objective:       Vitals:    05/08/18 1129   BP: 121/69   Pulse: 89   Weight: 107.5 kg (237 lb)   Height: 6' (1.829 m)   ;s  Physical Exam   Constitutional: He appears well-developed and well-nourished.   BMI of 32 but slight abdominal adiposity.   HENT:   Head: Normocephalic and atraumatic.   Mouth/Throat: No oropharyngeal exudate.   Eyes: Conjunctivae and EOM are normal.   Neck: Normal range of motion. Neck supple. No JVD present.  No tracheal deviation present. No thyromegaly present.   Cardiovascular: Normal rate, regular rhythm and normal heart sounds.  Exam reveals no gallop and no friction rub.    No murmur heard.  Pulmonary/Chest: Effort normal and breath sounds normal. No respiratory distress. He has no wheezes. He has no rales.   Abdominal: Soft. Bowel sounds are normal. There is no tenderness.   Neurological: He is alert.   Skin: Skin is warm and dry.   Psychiatric: He has a normal mood and affect.   Nursing note and vitals reviewed.      Assessment:     Hepatitis C antibody   Order: [id number]   Status:  Final result   Visible to patient:  Yes (Patient Portal) Next appt:  None    Ref Range & Units 6d ago   Hepatitis C Ab 0.0 - 0.9 s/co ratio <0.1    Comment:                                   Negative:     < 0.8                             Indeterminate: 0.8 - 0.9                                  Positive:     > 0.9 The CDC recommends that a positive HCV antibody result be followed up with a HCV Nucleic Acid   Amplification test (030209).Performed at: [facility] [address], [id number] Sparks MD, Phone:  [phone]    Resulting Agency  [facility]      Specimen Collected: 05/02/18 11:41 Last Resulted: 05/03/18 07:29 Lab Flowsheet Order               Cholesterol                   142                         mg/dL       Triglycerides                  99                         mg/dL       HDL Cholesterol                48                        23-75  mg/dL       LDL Cholesterol                74                        0-100  mg/dL       VLDL Cholesterol               20                        12-27  mg/dL       Cholesterol Ratio            3.00                                            CHOLESTEROL RATIO INTERPRETATION                           MEN-- WOMEN       Comprehensive metabolic panel   Order: [id number]   Status:  Final result   Visible to patient:  Yes (Patient Portal) Next  appt:  08/08/2018 at 10:40 AM in Family Medicine (Chuckie Alberto MD)    Ref Range & Units 6d ago   Glucose 70 - 99 mg/dL 137     BUN, Bld 8 - 20 mg/dL 18    Creatinine 0.60 - 1.40 mg/dL 1.17    Calcium 7.7 - 10.4 mg/dL 9.3    Sodium 134 - 144 mmol/L 137    Potassium 3.5 - 5.0 mmol/L 4.1    Chloride 98 - 110 mmol/L 105    CO2 22.8 - 31.6 mmol/L 22.1     Albumin 3.1 - 4.7 g/dL 4.5    Total Bilirubin 0.3 - 1.0 mg/dL 0.7    Alkaline Phosphatase 40 - 104 IU/L 92    Total Protein 6.0 - 8.2 g/dL 7.5    ALT (SGPT) 3 - 33 IU/L 38     AST 10 - 40 IU/L 39                                                  1. Benign essential hypertension    2. Hypercholesterolemia    3. Gastroesophageal reflux disease without esophagitis    4. Benign prostatic hyperplasia, presence of lower urinary tract symptoms unspecified    5. Impaired fasting glucose    6. Abnormal AST and ALT         Plan:           Benign essential hypertension  -     Basic metabolic panel; Future; Expected date: 05/08/2018    Hypercholesterolemia    Gastroesophageal reflux disease without esophagitis    Benign prostatic hyperplasia, presence of lower urinary tract symptoms unspecified    Impaired fasting glucose  -     Hemoglobin A1c; Future; Expected date: 05/08/2018    Abnormal AST and ALT  -     ALT (SGPT); Future; Expected date: 05/08/2018  -     AST (SGOT); Future; Expected date: 05/08/2018    Patient's blood pressures are stable. Lipid panel is excellent. His ALT slightly elevated and his blood sugars are elevated above 126. Again reiterated importance of diet and alcohol in moderation. I'm not going to make any changes in medication.    I will check labs including hemoglobin A1c, BMP and ALT AST at next visit. Multiple medications reviewed. Preventive care issues discussed.    Current Outpatient Prescriptions on File Prior to Visit   Medication Sig Dispense Refill    aspirin (ECOTRIN) 81 MG EC tablet Take 1 tablet by mouth Daily.      atorvastatin (LIPITOR) 10 MG  tablet TAKE 1 TABLET DAILY 90 tablet 2    atovaquone-proguanil (MALARONE) 250-100 mg Tab Take 1 tablet by mouth once daily. 16 tablet 0    hydrocortisone valerate (WEST-KIMANI) 0.2 % ointment 1 Dose once daily.      ibuprofen (ADVIL,MOTRIN) 800 MG tablet TAKE 1 TABLET DAILY WITH A MEAL AS NEEDED FOR BACK PAIN 90 tablet 1    lisinopril (PRINIVIL,ZESTRIL) 20 MG tablet TAKE 1 TABLET DAILY FOR BLOOD PRESSURE 90 tablet 2    loratadine (CLARITIN) 10 mg tablet TAKE 1 TABLET DAILY 90 tablet 1    metFORMIN (GLUCOPHAGE) 500 MG tablet TAKE 1 TABLET DAILY 90 tablet 2    NIFEdipine (ADALAT CC) 90 MG TbSR TAKE 1 TABLET DAILY 90 tablet 2    nystatin (MYCOSTATIN) cream Apply topically 2 (two) times daily. 30 g 0    pantoprazole (PROTONIX) 40 MG tablet Take 1 tablet (40 mg total) by mouth once daily. 90 tablet 3    sildenafil (VIAGRA) 100 MG tablet Take 1 tablet (100 mg total) by mouth daily as needed. 24 tablet 3    tamsulosin (FLOMAX) 0.4 mg Cp24 Take 1 capsule (0.4 mg total) by mouth once daily. 90 capsule 3    [DISCONTINUED] doxycycline (MONODOX) 100 MG capsule Take 1 capsule (100 mg total) by mouth once daily. 37 capsule 0     No current facility-administered medications on file prior to visit.

## 2018-05-08 NOTE — PATIENT INSTRUCTIONS
Tips to Control Acid Reflux    To control acid reflux, youll need to make some basic diet and lifestyle changes. The simple steps outlined below may be all youll need to ease discomfort.  Watch what you eat  · Avoid fatty foods and spicy foods.  · Eat fewer acidic foods, such as citrus and tomato-based foods. These can increase symptoms.  · Limit drinking alcohol, caffeine, and fizzy beverages. All increase acid reflux.  · Try limiting chocolate, peppermint, and spearmint. These can worsen acid reflux in some people.  Watch when you eat  · Avoid lying down for 3 hours after eating.  · Do not snack before going to bed.  Raise your head  Raising your head and upper body by 4 to 6 inches helps limit reflux when youre lying down. Put blocks under the head of your bed frame to raise it.  Other changes  · Lose weight, if you need to  · Dont exercise near bedtime  · Avoid tight-fitting clothes  · Limit aspirin and ibuprofen  · Stop smoking   Date Last Reviewed: 7/1/2016 © 2000-2017 [employer]. [address] [place] [address]. All rights reserved. This information is not intended as a substitute for professional medical care. Always follow your healthcare professional's instructions.        Prediabetes  You have been diagnosed with prediabetes. This means that the level of sugar (glucose) in your blood is too high. If you have prediabetes, you are at risk for developing type 2 diabetes. Type 2 diabetes is diagnosed when the level of glucose in the blood reaches a certain high level. With prediabetes, it hasnt reached this point yet, but it is higher than normal. It is vital to make lifestyle changes to lower your blood sugar, improve your health, and prevent diabetes. This sheet will tell you more.      Why worry about prediabetes?  Prediabetes is a disease where the bodys cells have trouble using glucose in the blood for energy. As a result, too much glucose stays in the blood and can  affect how your heart and blood vessels work. Without changes in diet and lifestyle, the problem can get worse. Once you have type 2 diabetes, it is chronic (ongoing) and needs to be managed for the rest of your life. Diabetes can harm the body and your health by damaging organs, such as your eyes and kidneys. It makes you more likely to have heart disease. And it can damage nerves and blood vessels.  Who is a risk for prediabetes?  The exact cause of prediabetes is not clear. But certain risk factors make a person more likely to have it. These include:  · A family history of type 2 diabetes  · Being overweight  · Being over age 45  · Have hypertension or elevated cholesterol   · Having had gestational diabetes  · Not being physically active  · Being ,  American, , , , or   Diagnosing prediabetes  Prediabetes may have no symptoms or you may have some of the symptoms of diabetes. The diagnosis is made with a blood test. You may have one or more of these blood tests:   · Fasting glucose test. Blood is taken and tested after you have fasted (not eaten) for at least 8 hours. A normal test result is 99 milligrams per deciliter (mg/dL) or lower. Prediabetes is 100 mg/dL to 125 mg/dL. Diabetes is 126 mg/dL or higher.  · Glucose tolerance test. Your blood sugar is measured before and after you drink a very sugary liquid. A normal test result is 139 milligrams per deciliter (mg/dL) or lower. Prediabetes is 140 mg/dL to 199 mg/dL. Diabetes is 200 mg/dL or higher.  · Hemoglobin A1c (HbA1c). Your HbA1c is normal if it is below 5.7%. Prediabetes is 5.7% to 6.4%. Diabetes is 6.5% or higher.   Treating prediabetes  The best way to treat prediabetes is to lose at least 5% to 7% of your current weight and be more physically active by getting at least 150 minutes a week of physical activity. When sitting for long periods of time, get up for short sessions of  light activity every 30 minutes. These changes help the bodys cells use blood sugar better. Even a small amount of weight loss can help. Work with your healthcare provider to make a plan to eat well and be more active. Keep in mind that small changes can add up. Other changes in your lifestyle (or even taking certain medicines, such as metformin) may make you less likely to develop diabetes. Your healthcare provider can talk with you about these.  Follow-up  If it is untreated, prediabetes can turn into diabetes. This is a serious health condition. Take steps to stop this from happening. Follow the treatment plan you have been given. You may have your blood glucose tested again in about 12 to 18 months.  Symptoms of diabetes  Let your healthcare provider know if you have any of the following:  · Always feel very tired  · Feel very thirsty or hungry much of the time  · Have to urinate often  · Lose weight for no reason  · Feel numbness or tingling in your fingers or toes  · Have cuts or bruises that dont seem to heal  · Have blurry vision   Date Last Reviewed: 5/1/2016  © 7730-6690 [employer] [address] [place] [address]. All rights reserved. This information is not intended as a substitute for professional medical care. Always follow your healthcare professional's instructions.        Weight Management: Getting Started  Healthy bodies come in all shapes and sizes. Not all bodies are made to be thin. For some people, a healthy weight is higher than the average weight listed on weight charts. Your healthcare provider can help you decide on a healthy weight for you.    Reasons to lose weight  Losing weight can help with some health problems, such as high blood pressure, heart disease, diabetes, sleep apnea, and arthritis. You may also feel more energy.  Set your long-term goal  Your goal doesn't even have to be a specific weight. You may decide on a fitness goal (such as being able to  walk 10 miles a week), or a health goal (such as lowering your blood pressure). Choose a goal that is measurable and reasonable, so you know when you've reached it. A goal of reaching a BMI of less than 25 is not always reasonable (or possible).   Make an action plan  Habits dont change overnight. Setting your goals too high can leave you feeling discouraged if you cant reach them. Be realistic. Choose one or two small changes you can make now. Set an action plan for how you are going to make these changes. When you can stick to this plan, keep making a few more small changes. Taking small steps will help you stay on the path to success.  Track your progress  Write down your goals. Then, keep a daily record of your progress. Write down what you eat and how active you are. This record lets you look back on how much youve done. It may also help when youre feeling frustrated. Reward yourself for success. Even if you dont reach every goal, give yourself credit for what you do get done.  Get support  Encouragement from others can help make losing weight easier. Ask your family members and friends for support. They may even want to join you. Also look to your healthcare provider, registered dietitian, and  for help. Your local hospital can give you more information about nutrition, exercise, and weight loss.  Date Last Reviewed: 1/31/2016  © 3598-6240 [employer] [address] [place] [address], [place] [address]. All rights reserved. This information is not intended as a substitute for professional medical care. Always follow your healthcare professional's instructions.

## 2018-05-15 RX ORDER — LORATADINE 10 MG/1
TABLET ORAL
Qty: 90 TABLET | Refills: 1 | Status: SHIPPED | OUTPATIENT
Start: 2018-05-15 | End: 2018-11-15 | Stop reason: SDUPTHER

## 2018-07-11 ENCOUNTER — OFFICE VISIT (OUTPATIENT)
Dept: FAMILY MEDICINE | Facility: CLINIC | Age: 63
End: 2018-07-11
Payer: OTHER GOVERNMENT

## 2018-07-11 VITALS
BODY MASS INDEX: 30.88 KG/M2 | WEIGHT: 228 LBS | DIASTOLIC BLOOD PRESSURE: 87 MMHG | HEART RATE: 86 BPM | HEIGHT: 72 IN | SYSTOLIC BLOOD PRESSURE: 135 MMHG

## 2018-07-11 DIAGNOSIS — M79.644 CHRONIC PAIN OF RIGHT THUMB: Primary | ICD-10-CM

## 2018-07-11 DIAGNOSIS — G89.29 CHRONIC PAIN OF RIGHT THUMB: Primary | ICD-10-CM

## 2018-07-11 PROCEDURE — 99212 OFFICE O/P EST SF 10 MIN: CPT | Mod: ,,, | Performed by: INTERNAL MEDICINE

## 2018-07-11 NOTE — PROGRESS NOTES
Subjective:       Patient ID: Cipriano Gunderson is a 63 y.o. male.    Chief Complaint: Hand Pain    Mr. Cipriano jackman is a 63-year-old gentleman who continues to have problems at the base of his right thumb joint. This has been going on for more than several months. It wax and wanes and he continues to have inflammation and swelling of the base of the right thumb.    He barely recalls that he might have had some injury or trauma at the base of right thumb long ago. No details are remembered at this time. Left thumb is doing okay. He does take ibuprofen as needed.        Past Medical History:   Diagnosis Date    Depression     Diabetes mellitus, type 2     GERD (gastroesophageal reflux disease)     Hyperlipidemia     Hypertension      Social History     Social History    Marital status:      Spouse name: N/A    Number of children: N/A    Years of education: N/A     Occupational History    Not on file.     Social History Main Topics    Smoking status: Current Some Day Smoker     Types: Cigars    Smokeless tobacco: Never Used    Alcohol use Yes    Drug use: No    Sexual activity: Yes     Partners: Female     Other Topics Concern    Not on file     Social History Narrative    No narrative on file     Past Surgical History:   Procedure Laterality Date    HERNIA REPAIR      NASAL SEPTUM SURGERY       Family History   Problem Relation Age of Onset    Hypertension Mother     Cancer Mother     Hypertension Father     Heart disease Father        Review of Systems   Constitutional: Negative.  Negative for activity change, appetite change and unexpected weight change (atient did lose a few pounds of weight.).   HENT: Negative for congestion and postnasal drip.    Eyes: Negative.    Respiratory: Negative.    Cardiovascular: Negative.    Gastrointestinal: Negative.    Musculoskeletal: Positive for arthralgias.        Continued pain at the base of the right thumb.       Objective:       Vitals:     07/11/18 1307   BP: 135/87   Pulse: 86   Weight: 103.4 kg (228 lb)   Height: 6' (1.829 m)     Physical Exam   Constitutional: He appears well-developed and well-nourished.   BMI of 30 but slight abdominal adiposity.   HENT:   Head: Normocephalic and atraumatic.   Mouth/Throat: No oropharyngeal exudate.   Eyes: Conjunctivae and EOM are normal.   Neck: Normal range of motion. Neck supple. No JVD present. No tracheal deviation present. No thyromegaly present.   Cardiovascular: Normal rate and regular rhythm.    Pulmonary/Chest: Effort normal and breath sounds normal.   Abdominal: Soft.   Musculoskeletal:        Hands:  Neurological: He is alert.   Skin: Skin is warm and dry.   Nursing note and vitals reviewed.      Assessment:       1. Chronic pain of right thumb         Plan:           Chronic pain of right thumb  -     X-Ray Finger 2 or More Views; Future; Expected date: 07/11/2018

## 2018-07-11 NOTE — PATIENT INSTRUCTIONS
De Quervain Tenosynovitis    De Quervain tenosynovitis is inflammation of tendons and synovium on the thumb side of the wrist. Tendons are fibers that attach muscle to bone. Synovium is a slick membrane that helps tendons move. Movements done over and over can irritate and inflame these tissues. This can cause pain when you touch or grasp objects, turn or twist your wrist, or make a fist. You may also have pain and swelling near the base of the thumb or numbness along the back of your thumb and index finger. You may also feel the thumb catch or snap when you move it.  Treatment will depend on how bad the pain is. It can often be treated with medicines, injections, splinting, and home care. If your case is severe, you may be referred to a specialist to talk about surgery.  Home care  Your healthcare provider may prescribe medicines to relieve pain and reduce inflammation. A steroid medicine may be injected near the tendons. This reduces swelling. The healthcare provider may also suggest taking over-the-counter medicines like ibuprofen or naproxen. These help reduce inflammation. Take all medicines only as directed.  The following are general care guidelines:  · Avoid repetitive movements of your wrist and thumb.  · Note any activity that causes pain or swelling. If possible, avoid or limit that activity.  · Put a cold pack on your thumb. You can make your own cold pack by wrapping a plastic bag of ice or bag of frozen vegetables in a thin towel. Hold this to your thumb for up to 20 minutes at a time. Don't put ice directly on the skin.  · Your healthcare provider may put a splint on the thumb to hold it still. Use the splint as you have been instructed. In some cases, you may need to use a splint 24 hours a day for 4 to 6 weeks. This will allow the wrist and thumb to heal.  Follow-up care  Follow up with your healthcare provider, or as advised. You may need more treatment if your injury is severe or if your  symptoms don't get better. This additional treatment may include local injections, physical therapy, and surgery.  When to seek medical advice  Call your healthcare provider right away if any of these occur:  · Increase in pain or swelling  · If you have fever, chills, redness, warmth, or drainage  · Symptoms get worse after taking medicine  · Pain spreads farther down the thumb or into the forearm  · Pain continues to get in the way of daily life  Date Last Reviewed: 1/18/2016 © 2000-2017 [employer]. [address] [place] [address]. All rights reserved. This information is not intended as a substitute for professional medical care. Always follow your healthcare professional's instructions.        Treating De Quervain Tenosynovitis  The goal of your treatment is to relieve your pain and allow you to use your thumb again. Treatment will depend on how severe the pain is.  Nonsurgical Treatment  Just taking a break from the activities that caused your pain may be enough. Your healthcare provider may also have you take oral nonsteroidal, anti-inflammatory medicine (NSAIDs), such as ibuprofen, or wear a splint for a few weeks to rest the thumb and wrist. To reduce the swelling, your healthcare provider may inject an anti-inflammatory medicine, such as cortisone, around the tendons. You may have more pain at first, but in a few days your thumb should feel better.    Surgery  If other kinds of treatment dont relieve your pain, or if the pain is severe, your healthcare provider may recommend surgery. The sheath that surrounds the tendons is released so the tendons can move more easily. This helps reduce the inflammation, and allows you to straighten your thumb without pain. Usually, surgery takes a few minutes and is done with local anesthetic, so you can go home the same day. You will probably have a splint or dressing on your wrist for a few days while the tissue heals. Your healthcare  provider will discuss the risks and possible complications of surgery with you.  Date Last Reviewed: 9/27/2015  © 9540-6279 [employer]. [address] [place] [address], [place] [address]. All rights reserved. This information is not intended as a substitute for professional medical care. Always follow your healthcare professional's instructions.        Understanding De Quervain Tenosynovitis    De Quervain tenosynovitis is a condition that can cause wrist and thumb pain. Tendons connect muscles in your wrist and forearm to the bones in your thumb. The tendons have a protective cover (sheath). The sheaths lining makes a fluid that lets the tendons slide easily when you straighten your wrist and thumb. If any of these tendons are irritated or injured, they can become swollen and inflamed. This is called de Quervain tenosynovitis.  How to say it  [id number]   What causes de Quervain tenosynovitis?  This condition is most often caused from overuse. For example, making the same wrist motions over and over can irritate the tendons. This includes doing things like unscrewing jar lids or grasping a tool. Activities such as typing, playing racquet sports, knitting, and texting can also lead to the condition.  Symptoms of de Quervain tenosynovitis  You may have pain, soreness, redness, and swelling along the side of your wrist and the base of your thumb. You may feel pain when you pinch or grasp things, turn or touch your wrist, or make a fist. Your thumb may catch or make a crackling sound when you move it.  Treatment for de Quervain tenosynovitis  Treatments may include:  · Resting the wrist and thumb. This involves limiting movements that make your symptoms worse. You also may need to avoid certain hobbies, sports, and types of work for a time.  · Cold packs. These help reduce pain and swelling.  · Prescription or over-the-counter pain medicines. These help relieve pain and swelling.  · Splint  or brace. This helps keep the thumb and wrist from moving and gives time for your tendons to heal.  · Exercises or physical therapy. These help stretch, strengthen, and improve the range of motion in your wrist and thumb.  · Shots of medicine into the area around the tendon. These may help relieve symptoms for a time.  · Surgery. You may need surgery if other treatments dont relieve symptoms. During surgery, the surgeon releases the sheath that surrounds the tendons so the tendons can move more easily.  Possible complications of de Quervain tenosynovitis  Without proper care and treatment, healing may take longer than normal. Also, symptoms may continue or get worse. Over time, the problem may become long-term (chronic). This can make it hard to use your wrist and thumb for normal activities.  When to call your healthcare provider  Call your healthcare provider right away if you have any of these:  · Fever of 100.4°F (38°C) or higher, or as directed  · Symptoms that dont get better with treatment, or get worse  · Pain, numbness, or coldness in the hand  · New symptoms   Date Last Reviewed: 3/10/2016  © 5476-2598 [employer]. [address] [place] [address], [place] [address]. All rights reserved. This information is not intended as a substitute for professional medical care. Always follow your healthcare professional's instructions.

## 2018-09-19 ENCOUNTER — PATIENT MESSAGE (OUTPATIENT)
Dept: FAMILY MEDICINE | Facility: CLINIC | Age: 63
End: 2018-09-19

## 2018-09-19 DIAGNOSIS — M54.50 CHRONIC MIDLINE LOW BACK PAIN WITHOUT SCIATICA: ICD-10-CM

## 2018-09-19 DIAGNOSIS — L29.3 ITCHING OF MALE GENITALIA: ICD-10-CM

## 2018-09-19 DIAGNOSIS — G89.29 CHRONIC MIDLINE LOW BACK PAIN WITHOUT SCIATICA: ICD-10-CM

## 2018-09-19 DIAGNOSIS — N52.9 ERECTILE DYSFUNCTION, UNSPECIFIED ERECTILE DYSFUNCTION TYPE: ICD-10-CM

## 2018-09-19 DIAGNOSIS — Z29.89 NEED FOR MALARIA PROPHYLAXIS: ICD-10-CM

## 2018-09-19 DIAGNOSIS — K21.9 GASTROESOPHAGEAL REFLUX DISEASE WITHOUT ESOPHAGITIS: ICD-10-CM

## 2018-09-19 RX ORDER — ATORVASTATIN CALCIUM 10 MG/1
10 TABLET, FILM COATED ORAL DAILY
Qty: 90 TABLET | Refills: 2 | Status: SHIPPED | OUTPATIENT
Start: 2018-09-19 | End: 2019-09-04 | Stop reason: SDUPTHER

## 2018-09-19 RX ORDER — ATOVAQUONE AND PROGUANIL HYDROCHLORIDE 250; 100 MG/1; MG/1
1 TABLET, FILM COATED ORAL DAILY
Qty: 16 TABLET | Refills: 0 | OUTPATIENT
Start: 2018-09-19

## 2018-09-19 RX ORDER — SILDENAFIL 100 MG/1
100 TABLET, FILM COATED ORAL DAILY PRN
Qty: 24 TABLET | Refills: 3 | Status: SHIPPED | OUTPATIENT
Start: 2018-09-19 | End: 2019-09-30 | Stop reason: SDUPTHER

## 2018-09-19 RX ORDER — NIFEDIPINE 90 MG/1
90 TABLET, FILM COATED, EXTENDED RELEASE ORAL DAILY
Qty: 90 TABLET | Refills: 2 | Status: SHIPPED | OUTPATIENT
Start: 2018-09-19 | End: 2018-12-30 | Stop reason: SDUPTHER

## 2018-09-19 RX ORDER — NYSTATIN 100000 U/G
CREAM TOPICAL 2 TIMES DAILY
Qty: 30 G | Refills: 0 | Status: SHIPPED | OUTPATIENT
Start: 2018-09-19 | End: 2020-01-27

## 2018-09-19 RX ORDER — IBUPROFEN 800 MG/1
800 TABLET ORAL 3 TIMES DAILY PRN
Qty: 90 TABLET | Refills: 1 | Status: SHIPPED | OUTPATIENT
Start: 2018-09-19 | End: 2019-01-31 | Stop reason: SDUPTHER

## 2018-09-19 RX ORDER — PANTOPRAZOLE SODIUM 40 MG/1
40 TABLET, DELAYED RELEASE ORAL DAILY
Qty: 90 TABLET | Refills: 3 | Status: SHIPPED | OUTPATIENT
Start: 2018-09-19 | End: 2019-09-24 | Stop reason: SDUPTHER

## 2018-09-19 RX ORDER — LISINOPRIL 20 MG/1
20 TABLET ORAL DAILY
Qty: 90 TABLET | Refills: 2 | Status: SHIPPED | OUTPATIENT
Start: 2018-09-19 | End: 2018-12-04 | Stop reason: SDUPTHER

## 2018-09-19 RX ORDER — METFORMIN HYDROCHLORIDE 500 MG/1
500 TABLET ORAL DAILY
Qty: 90 TABLET | Refills: 2 | Status: SHIPPED | OUTPATIENT
Start: 2018-09-19 | End: 2018-09-23 | Stop reason: SDUPTHER

## 2018-09-23 DIAGNOSIS — Z29.89 NEED FOR MALARIA PROPHYLAXIS: ICD-10-CM

## 2018-09-23 RX ORDER — ATOVAQUONE AND PROGUANIL HYDROCHLORIDE 250; 100 MG/1; MG/1
TABLET, FILM COATED ORAL
Qty: 16 TABLET | Refills: 0 | Status: SHIPPED | OUTPATIENT
Start: 2018-09-23 | End: 2018-10-22

## 2018-09-23 RX ORDER — METFORMIN HYDROCHLORIDE 500 MG/1
500 TABLET ORAL DAILY
Qty: 90 TABLET | Refills: 4 | Status: SHIPPED | OUTPATIENT
Start: 2018-09-23 | End: 2023-08-28 | Stop reason: SDUPTHER

## 2018-09-23 NOTE — PROGRESS NOTES
Pt traveling to [facility] and Rx called for Malarone for malaria prophylaxis.

## 2018-10-17 LAB
ALT (SGPT): 32 IU/L (ref 3–33)
AST SERPL-CCNC: 26 IU/L (ref 10–40)
BUN SERPL-MCNC: 10 MG/DL (ref 8–20)
CALCIUM SERPL-MCNC: 9.3 MG/DL (ref 7.7–10.4)
CHLORIDE: 107 MMOL/L (ref 98–110)
CO2 SERPL-SCNC: 23.8 MMOL/L (ref 22.8–31.6)
CREATININE: 0.9 MG/DL (ref 0.6–1.4)
GLUCOSE: 127 MG/DL (ref 70–99)
HBA1C MFR BLD: 6.3 % (ref 3.1–6.5)
POTASSIUM SERPL-SCNC: 3.9 MMOL/L (ref 3.5–5)
SODIUM: 139 MMOL/L (ref 134–144)

## 2018-10-22 ENCOUNTER — OFFICE VISIT (OUTPATIENT)
Dept: FAMILY MEDICINE | Facility: CLINIC | Age: 63
End: 2018-10-22
Payer: OTHER GOVERNMENT

## 2018-10-22 VITALS
HEART RATE: 83 BPM | SYSTOLIC BLOOD PRESSURE: 127 MMHG | WEIGHT: 226 LBS | DIASTOLIC BLOOD PRESSURE: 84 MMHG | BODY MASS INDEX: 30.61 KG/M2 | HEIGHT: 72 IN

## 2018-10-22 DIAGNOSIS — I10 BENIGN ESSENTIAL HYPERTENSION: Primary | ICD-10-CM

## 2018-10-22 DIAGNOSIS — E78.00 HYPERCHOLESTEROLEMIA: ICD-10-CM

## 2018-10-22 DIAGNOSIS — Z23 INFLUENZA VACCINE ADMINISTERED: ICD-10-CM

## 2018-10-22 DIAGNOSIS — R73.01 IMPAIRED FASTING GLUCOSE: ICD-10-CM

## 2018-10-22 PROCEDURE — 90686 IIV4 VACC NO PRSV 0.5 ML IM: CPT | Mod: ,,, | Performed by: INTERNAL MEDICINE

## 2018-10-22 PROCEDURE — 90471 IMMUNIZATION ADMIN: CPT | Mod: ,,, | Performed by: INTERNAL MEDICINE

## 2018-10-22 PROCEDURE — 99213 OFFICE O/P EST LOW 20 MIN: CPT | Mod: 25,,, | Performed by: INTERNAL MEDICINE

## 2018-10-22 NOTE — PROGRESS NOTES
Subjective:       Patient ID: Cipriano Gunderson is a 63 y.o. male.    Chief Complaint: Hypertension (lab review ); Hyperlipidemia; and Hyperglycemia    Mr. Cipriano Gunderson is a 63-year-old gentleman who comes for follow-up. He has underlying hypertension, dyslipidemia and erectile dysfunction.    His blood pressures are generally doing okay at home and he takes nifedipine and lisinopril for the same.    Is also compliant to his generic Lipitor 10 mg.    Family history has been reviewed.    Few visits back he had complained of sciatica type pain and he recently had a good massage treatment which seemed to help him.( From his football player friend at [facility])    He is also trying to watch his diet and has lost approximately 7+2 = 9 pounds of weight since the last 2 visits.    I've reviewed his labs and general chemistry including kidney functions are normal. His blood sugar is slightly elevated with a hemoglobin A1c of 6.3 and fasting blood glucose of 127. Currently he is taking metformin for the same. Thus far he has not been diagnosed with diabetes but only elevated blood sugars.    Recently made mission trips to [place] and used Malarone for MAlaria prophylaxis      Hypertension   This is a chronic problem. The current episode started more than 1 year ago. The problem is controlled. Pertinent negatives include no chest pain, palpitations or shortness of breath. Risk factors for coronary artery disease include male gender and dyslipidemia. Past treatments include ACE inhibitors and calcium channel blockers. The current treatment provides moderate improvement. There is no history of pheochromocytoma or renovascular disease.   Hyperlipidemia   This is a chronic problem. The current episode started more than 1 year ago. The problem is controlled. Pertinent negatives include no chest pain, myalgias or shortness of breath. Current antihyperlipidemic treatment includes statins. The current treatment provides  moderate improvement of lipids.       Past Medical History:   Diagnosis Date    Depression     Diabetes mellitus, type 2     GERD (gastroesophageal reflux disease)     Hyperlipidemia     Hypertension      Social History     Socioeconomic History    Marital status:      Spouse name: Not on file    Number of children: 2    Years of education: Not on file    Highest education level: Not on file   Social Needs    Financial resource strain: Not on file    Food insecurity - worry: Not on file    Food insecurity - inability: Not on file    Transportation needs - medical: Not on file    Transportation needs - non-medical: Not on file   Occupational History    Not on file   Tobacco Use    Smoking status: Current Some Day Smoker     Types: Cigars    Smokeless tobacco: Never Used   Substance and Sexual Activity    Alcohol use: Yes    Drug use: No    Sexual activity: Yes     Partners: Female   Other Topics Concern    Not on file   Social History Narrative    Not on file     Past Surgical History:   Procedure Laterality Date    HERNIA REPAIR      NASAL SEPTUM SURGERY       Family History   Problem Relation Age of Onset    Hypertension Mother     Cancer Mother         ??    Hypertension Father     Heart disease Father     Heart failure Father        Review of Systems   Constitutional: Negative for activity change, appetite change, chills, diaphoresis, fatigue, fever and unexpected weight change (lost 7+2= 9 lbs of wt).   HENT: Negative for congestion, drooling, facial swelling and mouth sores.    Eyes: Negative for pain, discharge and itching.   Respiratory: Negative for cough, chest tightness, shortness of breath and stridor.    Cardiovascular: Negative for chest pain, palpitations and leg swelling.        Underlying stable hypertension   Gastrointestinal: Negative for abdominal distention, abdominal pain and anal bleeding.        Stable reflux symptoms.   Endocrine: Negative for polydipsia.         Dyslipidemia-elevated blood sugar on metformin   Genitourinary: Negative for difficulty urinating and frequency.        Itching in penis   Musculoskeletal: Positive for arthralgias and back pain. Negative for myalgias.        Hand and wrist pains. No obvious swelling noted.  Sciatic pain   Neurological: Negative for dizziness and numbness.   Hematological: Negative for adenopathy.   Psychiatric/Behavioral: Negative for agitation, confusion and dysphoric mood.         Objective:      Blood pressure 127/84, pulse 83, height 6' (1.829 m), weight 102.5 kg (226 lb). Body mass index is 30.65 kg/m².  Physical Exam   Constitutional: He appears well-developed and well-nourished.   BMI of 30   HENT:   Head: Normocephalic and atraumatic.   Mouth/Throat: No oropharyngeal exudate.   Eyes: Conjunctivae and EOM are normal.   Neck: Normal range of motion. Neck supple. No JVD present. No tracheal deviation present. No thyromegaly present.   Cardiovascular: Normal rate, regular rhythm and normal heart sounds.  Occasional extrasystoles are present. Exam reveals no gallop and no friction rub.   No murmur heard.  occ    Pulmonary/Chest: Effort normal and breath sounds normal. No respiratory distress. He has no wheezes. He has no rales.   Abdominal: Soft. Bowel sounds are normal. There is no tenderness.   Neurological: He is alert.   Skin: Skin is warm and dry.   Psychiatric: He has a normal mood and affect.   Nursing note and vitals reviewed.        Assessment:       1. Benign essential hypertension    2. Hypercholesterolemia    3. Influenza vaccine administered    4. Impaired fasting glucose           Orders Only on 10/17/2018   Component Date Value Ref Range Status    Glucose 10/17/2018 127* 70 - 99 mg/dL Final    BUN, Bld 10/17/2018 10  8 - 20 mg/dL Final    Creatinine 10/17/2018 0.90  0.60 - 1.40 mg/dL Final    Calcium 10/17/2018 9.3  7.7 - 10.4 mg/dL Final    Sodium 10/17/2018 139  134 - 144 mmol/L Final    Potassium  10/17/2018 3.9  3.5 - 5.0 mmol/L Final    Chloride 10/17/2018 107  98 - 110 mmol/L Final    CO2 10/17/2018 23.8  22.8 - 31.6 mmol/L Final    ALT (SGPT) 10/17/2018 32  3 - 33 IU/L Final    AST 10/17/2018 26  10 - 40 IU/L Final    Hemoglobin A1C 10/17/2018 6.3  3.1 - 6.5 % Final         Plan:           Benign essential hypertension    Hypercholesterolemia  -     Lipid panel; Future; Expected date: 10/22/2018  -     Basic metabolic panel; Future; Expected date: 10/22/2018  -     ALT (SGPT); Future; Expected date: 10/22/2018    Influenza vaccine administered  -     Influenza - Quadrivalent (3 years & older) (PF)    Impaired fasting glucose  -     Hemoglobin A1c; Future; Expected date: 10/22/2018      Patient has been advised to watch diet and exercise. Avoid fried and fatty food. Compliance to medications and follow up urged.    Patient has lost approximately 9 pounds of weight and I encouraged him to continue to watch his diet and lose a few pounds more. This will help control his blood sugars naturally and eventually his blood pressures and cholesterol also.    He'll be updated on influenza vaccination.    He did have a colonoscopy in 2014 but he is also is getting regular stool test for colon cancer screening at the [facility]. With a colonoscopy done in 2014- annual stool test need may be redundant till 2024.    With a normal colonoscopy actually does not even need annual stool fit test.    Current Outpatient Medications:     aspirin (ECOTRIN) 81 MG EC tablet, Take 1 tablet by mouth Daily., Disp: , Rfl:     atorvastatin (LIPITOR) 10 MG tablet, Take 1 tablet (10 mg total) by mouth once daily., Disp: 90 tablet, Rfl: 2    hydrocortisone valerate (WEST-KIMANI) 0.2 % ointment, 1 Dose once daily., Disp: , Rfl:     ibuprofen (ADVIL,MOTRIN) 800 MG tablet, Take 1 tablet (800 mg total) by mouth 3 (three) times daily as needed for Pain (joint pain)., Disp: 90 tablet, Rfl: 1    lisinopril (PRINIVIL,ZESTRIL) 20 MG  tablet, Take 1 tablet (20 mg total) by mouth once daily. FOR BLOOD PRESSURE, Disp: 90 tablet, Rfl: 2    loratadine (CLARITIN) 10 mg tablet, TAKE 1 TABLET DAILY, Disp: 90 tablet, Rfl: 1    metFORMIN (GLUCOPHAGE) 500 MG tablet, Take 1 tablet (500 mg total) by mouth once daily., Disp: 90 tablet, Rfl: 4    NIFEdipine (ADALAT CC) 90 MG TbSR, Take 1 tablet (90 mg total) by mouth once daily., Disp: 90 tablet, Rfl: 2    nystatin (MYCOSTATIN) cream, Apply topically 2 (two) times daily., Disp: 30 g, Rfl: 0    pantoprazole (PROTONIX) 40 MG tablet, Take 1 tablet (40 mg total) by mouth once daily., Disp: 90 tablet, Rfl: 3    sildenafil (VIAGRA) 100 MG tablet, Take 1 tablet (100 mg total) by mouth daily as needed., Disp: 24 tablet, Rfl: 3    tamsulosin (FLOMAX) 0.4 mg Cp24, Take 1 capsule (0.4 mg total) by mouth once daily., Disp: 90 capsule, Rfl: 3

## 2018-10-22 NOTE — PATIENT INSTRUCTIONS
Taking Your Blood Pressure  Blood pressure is the force of blood against the artery wall as it moves from the heart through the blood vessels. You can take your own blood pressure reading using a digital monitor. Take your readings the same each time, using the same arm. Take readings as often as your healthcare provider instructs.  About blood pressure monitors  Blood pressure monitors are designed for certain ages and cases. You can find monitors for older adults, for pregnant women, and for children. Make sure the one you choose is the right one for your age and situation.  The American Heart Association recommends an automatic cuff monitor that fits on your upper arm (bicep). The cuff should fit your arm size. A cuff thats too large or too small will not give an accurate reading. Measure around your upper arm to find your size.  Monitors that attach to your finger or wrist are not as accurate as monitors for your upper arm.  Ask your healthcare provider for help in choosing a monitor. Bring your monitor to your next provider visit if you need help in using it the correct way.  The steps below are general instructions for using an automatic digital monitor.  Step 1. Relax    · Take your blood pressure at the same time every day, such as in the morning or evening, or at the time your healthcare provider recommends.  · Wait at least a half-hour after smoking, eating, or exercising. Don't drink coffee, tea, soda, or other caffeinated beverages before checking your blood pressure.  · Sit comfortably at a table with both feet on the floor. Do not cross your legs or feet. Place the monitor near you.  · Rest for a few minutes before you begin.  Step 2. Wrap the cuff    · Place your arm on the table, palm up. Your arm should be at the level of your heart. Wrap the cuff around your upper arm, just above your elbow. Its best done on bare skin, not over clothing. Most cuffs will indicate where the brachial artery (the  blood vessel in the middle of the arm at the inner side of the elbow) should line up with the cuff. Look in your monitor's instruction booklet for an illustration. You can also bring your cuff to your healthcare provider and have them show you how to correctly place the cuff.  Step 3. Inflate the cuff    · Push the button that starts the pump.  · The cuff will tighten, then loosen.  · The numbers will change. When they stop changing, your blood pressure reading will appear.  · Take 2 or 3 readings one minute apart.  Step 4. Write down the results of each reading    · Write down your blood pressure numbers for each reading. Note the date and time. Keep your results in one place, such as a notebook. Even if your monitor has a built-in memory, keep a hard copy of the readings.  · Remove the cuff from your arm. Turn off the machine.  · Bring your blood pressure records with your healthcare providers at each visit.  · If you start a new blood pressure medicine, note the day you started the new medicine. Also note the day if you change the dose of your medicine. This information goes on your blood pressure recording sheet. This will help your healthcare provider monitor how well the medicine changes are working.  · Ask your healthcare provider what numbers should prompt you to call him or her. Also ask what numbers should prompt you to get help right away.  Date Last Reviewed: 11/1/2016 © 2000-2017 [employer]. [address] [place] [id number]. All rights reserved. This information is not intended as a substitute for professional medical care. Always follow your healthcare professional's instructions.        Prediabetes  You have been diagnosed with prediabetes. This means that the level of sugar (glucose) in your blood is too high. If you have prediabetes, you are at risk for developing type 2 diabetes. Type 2 diabetes is diagnosed when the level of glucose in the blood reaches a certain high  level. With prediabetes, it hasnt reached this point yet, but it is higher than normal. It is vital to make lifestyle changes to lower your blood sugar, improve your health, and prevent diabetes. This sheet will tell you more.      Why worry about prediabetes?  Prediabetes is a disease where the bodys cells have trouble using glucose in the blood for energy. As a result, too much glucose stays in the blood and can affect how your heart and blood vessels work. Without changes in diet and lifestyle, the problem can get worse. Once you have type 2 diabetes, it is chronic (ongoing) and needs to be managed for the rest of your life. Diabetes can harm the body and your health by damaging organs, such as your eyes and kidneys. It makes you more likely to have heart disease. And it can damage nerves and blood vessels.  Who is a risk for prediabetes?  The exact cause of prediabetes is not clear. But certain risk factors make a person more likely to have it. These include:  · A family history of type 2 diabetes  · Being overweight  · Being over age 45  · Have hypertension or elevated cholesterol   · Having had gestational diabetes  · Not being physically active  · Being ,  American, , , , or   Diagnosing prediabetes  Prediabetes may have no symptoms or you may have some of the symptoms of diabetes. The diagnosis is made with a blood test. You may have one or more of these blood tests:   · Fasting glucose test. Blood is taken and tested after you have fasted (not eaten) for at least 8 hours. A normal test result is 99 milligrams per deciliter (mg/dL) or lower. Prediabetes is 100 mg/dL to 125 mg/dL. Diabetes is 126 mg/dL or higher.  · Glucose tolerance test. Your blood sugar is measured before and after you drink a very sugary liquid. A normal test result is 139 milligrams per deciliter (mg/dL) or lower. Prediabetes is 140 mg/dL to 199 mg/dL. Diabetes is  200 mg/dL or higher.  · Hemoglobin A1c (HbA1c). Your HbA1c is normal if it is below 5.7%. Prediabetes is 5.7% to 6.4%. Diabetes is 6.5% or higher.   Treating prediabetes  The best way to treat prediabetes is to lose at least 5% to 7% of your current weight and be more physically active by getting at least 150 minutes a week of physical activity. When sitting for long periods of time, get up for short sessions of light activity every 30 minutes. These changes help the bodys cells use blood sugar better. Even a small amount of weight loss can help. Work with your healthcare provider to make a plan to eat well and be more active. Keep in mind that small changes can add up. Other changes in your lifestyle (or even taking certain medicines, such as metformin) may make you less likely to develop diabetes. Your healthcare provider can talk with you about these.  Follow-up  If it is untreated, prediabetes can turn into diabetes. This is a serious health condition. Take steps to stop this from happening. Follow the treatment plan you have been given. You may have your blood glucose tested again in about 12 to 18 months.  Symptoms of diabetes  Let your healthcare provider know if you have any of the following:  · Always feel very tired  · Feel very thirsty or hungry much of the time  · Have to urinate often  · Lose weight for no reason  · Feel numbness or tingling in your fingers or toes  · Have cuts or bruises that dont seem to heal  · Have blurry vision   Date Last Reviewed: 5/1/2016  © 3053-5001 [employer]. [address] [place] [address], [place] [address]. All rights reserved. This information is not intended as a substitute for professional medical care. Always follow your healthcare professional's instructions.

## 2018-11-11 RX ORDER — LORATADINE 10 MG/1
TABLET ORAL
Qty: 90 TABLET | Refills: 1 | OUTPATIENT
Start: 2018-11-11

## 2018-11-15 RX ORDER — LORATADINE 10 MG/1
10 TABLET ORAL DAILY
Qty: 90 TABLET | Refills: 1 | Status: SHIPPED | OUTPATIENT
Start: 2018-11-15 | End: 2019-02-25

## 2018-12-04 RX ORDER — LISINOPRIL 20 MG/1
TABLET ORAL
Qty: 90 TABLET | Refills: 2 | Status: SHIPPED | OUTPATIENT
Start: 2018-12-04 | End: 2019-02-25

## 2018-12-30 RX ORDER — NIFEDIPINE 90 MG/1
TABLET, FILM COATED, EXTENDED RELEASE ORAL
Qty: 90 TABLET | Refills: 2 | Status: SHIPPED | OUTPATIENT
Start: 2018-12-30 | End: 2019-12-02 | Stop reason: SDUPTHER

## 2019-01-02 ENCOUNTER — OFFICE VISIT (OUTPATIENT)
Dept: FAMILY MEDICINE | Facility: CLINIC | Age: 64
End: 2019-01-02
Payer: OTHER GOVERNMENT

## 2019-01-02 ENCOUNTER — PATIENT MESSAGE (OUTPATIENT)
Dept: FAMILY MEDICINE | Facility: CLINIC | Age: 64
End: 2019-01-02

## 2019-01-02 VITALS
BODY MASS INDEX: 30.34 KG/M2 | HEART RATE: 77 BPM | HEIGHT: 72 IN | WEIGHT: 224 LBS | DIASTOLIC BLOOD PRESSURE: 81 MMHG | TEMPERATURE: 98 F | SYSTOLIC BLOOD PRESSURE: 131 MMHG

## 2019-01-02 DIAGNOSIS — I49.49 ECTOPIC BEAT: ICD-10-CM

## 2019-01-02 DIAGNOSIS — J06.9 UPPER RESPIRATORY TRACT INFECTION, UNSPECIFIED TYPE: Primary | ICD-10-CM

## 2019-01-02 PROCEDURE — 99213 PR OFFICE/OUTPT VISIT, EST, LEVL III, 20-29 MIN: ICD-10-PCS | Mod: ,,, | Performed by: INTERNAL MEDICINE

## 2019-01-02 PROCEDURE — 99213 OFFICE O/P EST LOW 20 MIN: CPT | Mod: ,,, | Performed by: INTERNAL MEDICINE

## 2019-01-02 RX ORDER — POLYETHYLENE GLYCOL 400 AND PROPYLENE GLYCOL 4; 3 MG/ML; MG/ML
SOLUTION/ DROPS OPHTHALMIC
COMMUNITY
Start: 2018-11-27

## 2019-01-02 RX ORDER — TADALAFIL 5 MG/1
TABLET, FILM COATED ORAL
COMMUNITY
Start: 2018-11-26 | End: 2019-01-31

## 2019-01-02 RX ORDER — PREDNISONE 20 MG/1
20 TABLET ORAL DAILY
Qty: 4 TABLET | Refills: 0 | Status: SHIPPED | OUTPATIENT
Start: 2019-01-02 | End: 2019-01-06

## 2019-01-02 NOTE — PROGRESS NOTES
Subjective:       Patient ID: Cipriano Gunderson is a 63 y.o. male.    Chief Complaint: Cough and Chest Congestion    Mr. Cipriano Howe is on is a 63-year-old male who comes with complains of upper respiratory symptoms with minimal expectoration wito color. Duration 8-10 days around Xmas.  No fever and recall one nephew having URI /cough around X Mas.    Patient is also concerned about being told that he has ectopics or missed beats. He has had EKGs during his  service and he was apparently found to have ectopics. He does not consume excessive caffeine. Thus far he has not been diagnosed with any thyroid condition. Except for blood pressure which is under control he has not been diagnosed with any cardiac condition. In fact he does not experience any symptoms either.      URI    This is a new problem. The current episode started 1 to 4 weeks ago. The problem has been waxing and waning. There has been no fever. Associated symptoms include congestion and coughing. Pertinent negatives include no abdominal pain, chest pain, diarrhea, dysuria, headaches, plugged ear sensation, rash, sneezing, sore throat, swollen glands, vomiting or wheezing. He has tried steam for the symptoms. The treatment provided mild relief.       Past Medical History:   Diagnosis Date    Depression     Diabetes mellitus, type 2     GERD (gastroesophageal reflux disease)     Hyperlipidemia     Hypertension      Social History     Socioeconomic History    Marital status:      Spouse name: Not on file    Number of children: 2    Years of education: Not on file    Highest education level: Not on file   Social Needs    Financial resource strain: Not on file    Food insecurity - worry: Not on file    Food insecurity - inability: Not on file    Transportation needs - medical: Not on file    Transportation needs - non-medical: Not on file   Occupational History    Not on file   Tobacco Use    Smoking status: Current Some Day Smoker      Types: Cigars    Smokeless tobacco: Never Used   Substance and Sexual Activity    Alcohol use: Yes    Drug use: No    Sexual activity: Yes     Partners: Female   Other Topics Concern    Not on file   Social History Narrative    Not on file     Past Surgical History:   Procedure Laterality Date    HERNIA REPAIR      NASAL SEPTUM SURGERY       Family History   Problem Relation Age of Onset    Hypertension Mother     Cancer Mother         ??    Hypertension Father     Heart disease Father     Heart failure Father        Review of Systems   Constitutional: Negative for activity change, appetite change, chills, diaphoresis, fatigue, fever and unexpected weight change (Lost 2 lbs).   HENT: Positive for congestion. Negative for drooling, facial swelling, mouth sores, sneezing and sore throat.    Eyes: Negative for pain, discharge and itching.   Respiratory: Positive for cough. Negative for chest tightness, shortness of breath, wheezing and stridor.    Cardiovascular: Negative for chest pain, palpitations and leg swelling.        Underlying stable hypertension. Occasional ectopics.   Gastrointestinal: Negative for abdominal distention, abdominal pain, anal bleeding, diarrhea and vomiting.        Stable reflux symptoms.   Endocrine: Negative for polydipsia.        Dyslipidemia-elevated blood sugar on metformin   Genitourinary: Negative for difficulty urinating, dysuria and frequency.        Itching in penis   Musculoskeletal: Positive for arthralgias and back pain. Negative for myalgias.        Hand and wrist pains. No obvious swelling noted.  Sciatic pain   Skin: Negative for rash.   Neurological: Negative for dizziness, numbness and headaches.   Psychiatric/Behavioral: Negative for dysphoric mood.         Objective:      Blood pressure 131/81, pulse 77, temperature 98.4 °F (36.9 °C), height 6' (1.829 m), weight 101.6 kg (224 lb). Body mass index is 30.38 kg/m².  Physical Exam   Constitutional: He appears  well-developed and well-nourished.   BMI of 30   HENT:   Head: Normocephalic and atraumatic.   Mouth/Throat: No oropharyngeal exudate.   Eyes: Conjunctivae and EOM are normal.   Neck: Normal range of motion. Neck supple. No JVD present. No tracheal deviation present. No thyromegaly present.   Cardiovascular: Normal rate, regular rhythm, S1 normal, S2 normal, normal heart sounds and intact distal pulses.  Occasional extrasystoles are present. PMI is not displaced. Exam reveals no gallop and no friction rub.   No murmur heard.  occ  Ectopic in 1 minute.   Pulmonary/Chest: Effort normal and breath sounds normal. No respiratory distress. He has no wheezes. He has no rales.   Abdominal: Soft. Bowel sounds are normal. There is no tenderness.   Neurological: He is alert.   Skin: Skin is warm and dry.   Psychiatric: He has a normal mood and affect.   Nursing note and vitals reviewed.        Assessment:       1. Upper respiratory tract infection, unspecified type    2. Ectopic beat           Orders Only on 10/17/2018   Component Date Value Ref Range Status    Glucose 10/17/2018 127* 70 - 99 mg/dL Final    BUN, Bld 10/17/2018 10  8 - 20 mg/dL Final    Creatinine 10/17/2018 0.90  0.60 - 1.40 mg/dL Final    Calcium 10/17/2018 9.3  7.7 - 10.4 mg/dL Final    Sodium 10/17/2018 139  134 - 144 mmol/L Final    Potassium 10/17/2018 3.9  3.5 - 5.0 mmol/L Final    Chloride 10/17/2018 107  98 - 110 mmol/L Final    CO2 10/17/2018 23.8  22.8 - 31.6 mmol/L Final    ALT (SGPT) 10/17/2018 32  3 - 33 IU/L Final    AST 10/17/2018 26  10 - 40 IU/L Final    Hemoglobin A1C 10/17/2018 6.3  3.1 - 6.5 % Final         Plan:           Upper respiratory tract infection, unspecified type  -     predniSONE (DELTASONE) 20 MG tablet; Take 1 tablet (20 mg total) by mouth once daily. for 4 days  Dispense: 4 tablet; Refill: 0    Ectopic beat      Patients clinical examination is otherwise unremarkable except for some cough and congestion. I will  recommend him conservative care with saltwater gargles and steam inhalation. He should and can use the Netipot also. If he persists to have fevers by Friday with yellowish mucus that I will call him antibiotics. Keep his regular follow-up with me.    I've reassured him about ectopics. This should not be of any concern given no structural heart condition. Cut down on the caffeine if it is an issue.    Keep regular follow-up.              Net[employer]  Current Outpatient Medications:     aspirin (ECOTRIN) 81 MG EC tablet, Take 1 tablet by mouth Daily., Disp: , Rfl:     atorvastatin (LIPITOR) 10 MG tablet, Take 1 tablet (10 mg total) by mouth once daily., Disp: 90 tablet, Rfl: 2    CIALIS 5 mg tablet, , Disp: , Rfl:     hydrocortisone valerate (WEST-KIMANI) 0.2 % ointment, 1 Dose once daily., Disp: , Rfl:     ibuprofen (ADVIL,MOTRIN) 800 MG tablet, Take 1 tablet (800 mg total) by mouth 3 (three) times daily as needed for Pain (joint pain)., Disp: 90 tablet, Rfl: 1    lisinopril (PRINIVIL,ZESTRIL) 20 MG tablet, TAKE 1 TABLET DAILY FOR BLOOD PRESSURE, Disp: 90 tablet, Rfl: 2    loratadine (CLARITIN) 10 mg tablet, Take 1 tablet (10 mg total) by mouth once daily., Disp: 90 tablet, Rfl: 1    metFORMIN (GLUCOPHAGE) 500 MG tablet, Take 1 tablet (500 mg total) by mouth once daily., Disp: 90 tablet, Rfl: 4    NIFEdipine (ADALAT CC) 90 MG TbSR, TAKE 1 TABLET DAILY, Disp: 90 tablet, Rfl: 2    nystatin (MYCOSTATIN) cream, Apply topically 2 (two) times daily., Disp: 30 g, Rfl: 0    pantoprazole (PROTONIX) 40 MG tablet, Take 1 tablet (40 mg total) by mouth once daily., Disp: 90 tablet, Rfl: 3    sildenafil (VIAGRA) 100 MG tablet, Take 1 tablet (100 mg total) by mouth daily as needed., Disp: 24 tablet, Rfl: 3    SYSTANE ULTRA, PF, 0.4-0.3 % Dpet, , Disp: , Rfl:     tamsulosin (FLOMAX) 0.4 mg Cp24, Take 1 capsule (0.4 mg total) by mouth once daily., Disp: 90 capsule, Rfl: 3    predniSONE (DELTASONE) 20 MG tablet, Take 1 tablet  (20 mg total) by mouth once daily. for 4 days, Disp: 4 tablet, Rfl: 0

## 2019-01-02 NOTE — PATIENT INSTRUCTIONS
Bronchitis, Viral (Adult)    You have a viral bronchitis. Bronchitis is inflammation and swelling of the lining of the lungs. This is often caused by an infection. Symptoms include a dry, hacking cough that is worse at night. The cough may bring up yellow-green mucus. You may also feel short of breath or wheeze. Other symptoms may include tiredness, chest discomfort, and chills.  Bronchitis that is caused by a virus is not treated with antibiotics. Instead, medicines may be given to help relieve symptoms. Symptoms can last up to 2 weeks, although the cough may last much longer.  This illness is contagious during the first few days and is spread through the air by coughing and sneezing, or by direct contact (touching the sick person and then touching your own eyes, nose, or mouth).  Most viral illnesses resolve within 10 to 14 days with rest and simple home remedies, although they may sometimes last for several weeks.  Home care  · If symptoms are severe, rest at home for the first 2 to 3 days. When you go back to your usual activities, don't let yourself get too tired.  · Do not smoke. Also avoid being exposed to secondhand smoke.  · You may use over-the-counter medicine to control fever or pain, unless another pain medicine was prescribed. (Note: If you have chronic liver or kidney disease or have ever had a stomach ulcer or gastrointestinal bleeding, talk with your healthcare provider before using these medicines. Also talk to your provider if you are taking medicine to prevent blood clots.) Aspirin should never be given to anyone younger than 18 years of age who is ill with a viral infection or fever. It may cause severe liver or brain damage.  · Your appetite may be poor, so a light diet is fine. Avoid dehydration by drinking 6 to 8 glasses of fluids per day (such as water, soft drinks, sports drinks, juices, tea, or soup). Extra fluids will help loosen secretions in the nose and lungs.  · Over-the-counter  cough, cold, and sore-throat medicines will not shorten the length of the illness, but they may help to reduce symptoms. (Note: Do not use decongestants if you have high blood pressure.)  Follow-up care  Follow up with your healthcare provider, or as advised. If you had an X-ray or ECG (electrocardiogram), a specialist will review it. You will be notified of any new findings that may affect your care.  Note: If you are age 65 or older, or if you have a chronic lung disease or condition that affects your immune system, or you smoke, talk to your healthcare provider about having pneumococcal vaccinations and a yearly influenza vaccination (flu shot).  When to seek medical advice  Call your healthcare provider right away if any of these occur:  · Fever of 100.4°F (38°C) or higher  · Coughing up increased amounts of colored sputum  · Weakness, drowsiness, headache, facial pain, ear pain, or a stiff neck  Call 911, or get immediate medical care  Contact emergency services right away if any of these occur:  · Coughing up blood  · Worsening weakness, drowsiness, headache, or stiff neck  · Trouble breathing, wheezing, or pain with breathing  Date Last Reviewed: 9/13/2015 © 2000-2017 [employer]. [address] [place] [address]. All rights reserved. This information is not intended as a substitute for professional medical care. Always follow your healthcare professional's instructions.        Understanding Premature Ventricular Contractions (PVCs)  Premature ventricular contractions (PVCs) are a type of abnormal heartbeat (arrhythmia). They are very common. They can occur in people of all ages from time to time. They usually cause only mild symptoms.  How PVCs happen    Your heart has 4 chambers: 2 upper atria and 2 lower ventricles. Normally, a special group of cells begins the signal to start your heartbeat. These cells are in the sinoatrial (SA) node in the right atrium. The signal quickly travels  down your hearts conducting system. It travels to the left and right ventricle. As it travels, the signal triggers nearby parts of your heart to contract. This allows your heart to squeeze in a coordinated way.  During a premature ventricular contraction, the signal to start your heartbeat instead comes from one of the ventricles. This signal is premature, meaning it happens before the SA node has had a chance to fire. The signal spreads through the rest of your heart, causing a heartbeat. If this happens very soon after the previous heartbeat, your heart will push out very little blood. This causes a feeling of a pause between beats. If it happens a little later, your heart pushes out an almost normal amount of blood. This leads to a feeling of an extra heartbeat. So, the heart has a premature heartbeat in between normal heartbeats.  What causes PVCs?  Certain things can help set off a premature signal in the ventricles. These include:  · Reduced blood flow to your heart  · Scarring after a heart attack (myocardial infarction)  · Electrolyte problems, such as low sodium or potassium levels  · Increased adrenaline, such as with anxiety  · Certain medicines, like digoxin  Many heart conditions raise the risk for PVCs. These include:  · Mitral valve prolapse  · High blood pressure  · Heart attack  · Coronary heart disease  · cardiomyopathy  · Hypertrophic cardiomyopathy  · Congenital heart disease  They often happen in people without any heart disease. However, PVCs are somewhat more common in people with some kind of heart disease.  Symptoms of PVCs  Most people with occasional PVCs dont have symptoms. You are also more likely to have symptoms if you have PVCs often. When symptoms do happen, they are usually minor. Symptoms may include:  · An awareness of the heart beating  · A fluttering or flip-flop feeling in your chest  · Feeling of a skipped or extra heartbeat  · Dizziness and near-fainting  · A pulsing  sensation in the neck  PVCs may cause more severe symptoms if you have another heart problem, such as heart failure.  Diagnosing PVCs  Your healthcare provider will ask about your health history and give you a physical exam. An electrocardiogram (ECG) is the main test for diagnosis. This test allows your provider to look at the signal of your heartbeat for a brief time. Any PVCs that occur during this time will show up on the ECG. In some cases, your healthcare provider might advise ECG monitoring over a day or more, up to 30 days. This can help to catch PVCs that dont happen often. This is done with a monitor you wear night and day for the test period.  These may be the only tests your healthcare provider will need. You may need more testing if you have PVCs often, or many in a row. Your provider may look at other causes, including possible heart problems. These tests might include:  · Echocardiography, to look at your hearts structure and function  · Cardiac stress testing, to see how your heart responds to exercise and to evaluate blood flow through your heart  · Blood tests, to check potassium and thyroid levels  Date Last Reviewed: 2/17/2015  © 0158-8732 [employer]. [address] [place] [address], [place] [address]. All rights reserved. This information is not intended as a substitute for professional medical care. Always follow your healthcare professional's instructions.

## 2019-01-04 DIAGNOSIS — J06.9 UPPER RESPIRATORY TRACT INFECTION, UNSPECIFIED TYPE: Primary | ICD-10-CM

## 2019-01-04 RX ORDER — AMOXICILLIN 500 MG/1
500 CAPSULE ORAL EVERY 8 HOURS
Qty: 21 CAPSULE | Refills: 0 | Status: SHIPPED | OUTPATIENT
Start: 2019-01-04 | End: 2019-01-11

## 2019-01-31 ENCOUNTER — OFFICE VISIT (OUTPATIENT)
Dept: FAMILY MEDICINE | Facility: CLINIC | Age: 64
End: 2019-01-31
Payer: OTHER GOVERNMENT

## 2019-01-31 VITALS
HEART RATE: 91 BPM | TEMPERATURE: 99 F | DIASTOLIC BLOOD PRESSURE: 76 MMHG | BODY MASS INDEX: 30.75 KG/M2 | SYSTOLIC BLOOD PRESSURE: 120 MMHG | HEIGHT: 72 IN | WEIGHT: 227 LBS

## 2019-01-31 DIAGNOSIS — J31.0 NON-ALLERGIC RHINITIS: ICD-10-CM

## 2019-01-31 DIAGNOSIS — R05.9 COUGH: Primary | ICD-10-CM

## 2019-01-31 DIAGNOSIS — G89.29 CHRONIC MIDLINE LOW BACK PAIN WITHOUT SCIATICA: ICD-10-CM

## 2019-01-31 DIAGNOSIS — K21.9 GASTROESOPHAGEAL REFLUX DISEASE WITHOUT ESOPHAGITIS: ICD-10-CM

## 2019-01-31 DIAGNOSIS — M54.50 CHRONIC MIDLINE LOW BACK PAIN WITHOUT SCIATICA: ICD-10-CM

## 2019-01-31 PROCEDURE — 99213 OFFICE O/P EST LOW 20 MIN: CPT | Mod: ,,, | Performed by: INTERNAL MEDICINE

## 2019-01-31 PROCEDURE — 99213 PR OFFICE/OUTPT VISIT, EST, LEVL III, 20-29 MIN: ICD-10-PCS | Mod: ,,, | Performed by: INTERNAL MEDICINE

## 2019-01-31 RX ORDER — IPRATROPIUM BROMIDE 21 UG/1
2 SPRAY, METERED NASAL 2 TIMES DAILY
Qty: 90 ML | Refills: 1 | Status: SHIPPED | OUTPATIENT
Start: 2019-01-31 | End: 2019-09-29 | Stop reason: SDUPTHER

## 2019-01-31 RX ORDER — IBUPROFEN 800 MG/1
800 TABLET ORAL 3 TIMES DAILY PRN
Qty: 90 TABLET | Refills: 1 | Status: SHIPPED | OUTPATIENT
Start: 2019-01-31 | End: 2019-09-30 | Stop reason: SDUPTHER

## 2019-01-31 NOTE — PATIENT INSTRUCTIONS
Tips to Control Acid Reflux    To control acid reflux, youll need to make some basic diet and lifestyle changes. The simple steps outlined below may be all youll need to ease discomfort.  Watch what you eat  · Avoid fatty foods and spicy foods.  · Eat fewer acidic foods, such as citrus and tomato-based foods. These can increase symptoms.  · Limit drinking alcohol, caffeine, and fizzy beverages. All increase acid reflux.  · Try limiting chocolate, peppermint, and spearmint. These can worsen acid reflux in some people.  Watch when you eat  · Avoid lying down for 3 hours after eating.  · Do not snack before going to bed.  Raise your head  Raising your head and upper body by 4 to 6 inches helps limit reflux when youre lying down. Put blocks under the head of your bed frame to raise it.  Other changes  · Lose weight, if you need to  · Dont exercise near bedtime  · Avoid tight-fitting clothes  · Limit aspirin and ibuprofen  · Stop smoking   Date Last Reviewed: 7/1/2016  © 4003-6181 [employer]. [address] [place] [address], [place] [address]. All rights reserved. This information is not intended as a substitute for professional medical care. Always follow your healthcare professional's instructions.

## 2019-01-31 NOTE — PROGRESS NOTES
Subjective:       Patient ID: Cipriano Gunderson is a 63 y.o. male.    Chief Complaint: URI    Mr. Cipriano Huggins comes back with persistence of cough. Minimal expectoration. Some sinus congestion. He is being treated with steroids and antibiotics in past with perhaps some partial relief but recurrence of symptoms.    He is also on ACE inhibitors for quite sometime. He does have some sinus congestion and reflux-like symptoms.      Cough   This is a new problem. The current episode started more than 1 month ago. The problem has been unchanged. The problem occurs every few minutes. The cough is productive of sputum (minimal). Associated symptoms include weight loss. Pertinent negatives include no chest pain, chills, fever, headaches, hemoptysis, myalgias, rash, sore throat, shortness of breath or wheezing. There is no history of asthma, bronchiectasis, bronchitis, COPD, emphysema, environmental allergies or pneumonia.   Sinus Problem   This is a recurrent problem. The current episode started more than 1 month ago. The problem has been waxing and waning since onset. There has been no fever. Associated symptoms include congestion, coughing and sneezing. Pertinent negatives include no chills, diaphoresis, headaches, neck pain, shortness of breath or sore throat. Treatments tried: Patient has tried Flonase in past. The treatment provided mild relief.   Gastroesophageal Reflux   He complains of coughing. He reports no abdominal pain, no chest pain, no sore throat or no wheezing. The current episode started more than 1 year ago. The problem occurs frequently. The problem has been waxing and waning. The symptoms are aggravated by certain foods. Associated symptoms include weight loss. Pertinent negatives include no fatigue, melena or muscle weakness. Cough. Risk factors include caffeine use (Caffeine use is in the form of tea but not coffee.). He has tried a PPI (He has used pantoprazole intermittently but not regularly.) for the  symptoms. The treatment provided mild relief.     Patient also needs refills on ibuprofen for chronic low back pain which has been going on for years. No new injury. He uses ibuprofen as needed.  Past Medical History:   Diagnosis Date    Depression     Diabetes mellitus, type 2     GERD (gastroesophageal reflux disease)     Hyperlipidemia     Hypertension      Social History     Socioeconomic History    Marital status:      Spouse name: Not on file    Number of children: 2    Years of education: Not on file    Highest education level: Not on file   Social Needs    Financial resource strain: Not on file    Food insecurity - worry: Not on file    Food insecurity - inability: Not on file    Transportation needs - medical: Not on file    Transportation needs - non-medical: Not on file   Occupational History    Not on file   Tobacco Use    Smoking status: Current Some Day Smoker     Types: Cigars    Smokeless tobacco: Never Used   Substance and Sexual Activity    Alcohol use: Yes    Drug use: No    Sexual activity: Yes     Partners: Female   Other Topics Concern    Not on file   Social History Narrative    Not on file     Past Surgical History:   Procedure Laterality Date    HERNIA REPAIR      NASAL SEPTUM SURGERY       Family History   Problem Relation Age of Onset    Hypertension Mother     Cancer Mother         ??    Hypertension Father     Heart disease Father     Heart failure Father        Review of Systems   Constitutional: Positive for weight loss. Negative for activity change, appetite change, chills, diaphoresis, fatigue, fever and unexpected weight change (gained 3 lbs).   HENT: Positive for congestion and sneezing. Negative for drooling, facial swelling, mouth sores and sore throat.    Eyes: Negative for pain, discharge and itching.   Respiratory: Positive for cough. Negative for hemoptysis, chest tightness, shortness of breath, wheezing and stridor.    Cardiovascular:  Negative for chest pain, palpitations and leg swelling.        Underlying stable hypertension. Occasional ectopics.   Gastrointestinal: Negative for abdominal distention, abdominal pain, anal bleeding, diarrhea, melena and vomiting.        Stable reflux symptoms.   Endocrine: Negative for polydipsia.        Dyslipidemia-elevated blood sugar on metformin   Genitourinary: Negative for difficulty urinating, dysuria and frequency.        Itching in penis   Musculoskeletal: Positive for arthralgias and back pain. Negative for myalgias, muscle weakness and neck pain.        Hand and wrist pains. No obvious swelling noted.  Sciatic pain   Skin: Negative for rash.   Allergic/Immunologic: Negative for environmental allergies.   Neurological: Negative for dizziness, numbness and headaches.   Psychiatric/Behavioral: Negative for dysphoric mood.         Objective:      Blood pressure 120/76, pulse 91, temperature 98.8 °F (37.1 °C), height 6' (1.829 m), weight 103 kg (227 lb). Body mass index is 30.79 kg/m².  Physical Exam   Constitutional: He appears well-developed and well-nourished.   BMI of 30   HENT:   Head: Normocephalic and atraumatic.   Mouth/Throat: No oropharyngeal exudate.   Eyes: Conjunctivae and EOM are normal.   Neck: Normal range of motion. Neck supple. No JVD present. No tracheal deviation present. No thyromegaly present.   Cardiovascular: Normal rate, regular rhythm, S1 normal, S2 normal and normal heart sounds.  Occasional extrasystoles are present. PMI is not displaced.   Pulmonary/Chest: Effort normal and breath sounds normal. No respiratory distress. He has no wheezes. He has no rales.   Abdominal: Soft. Bowel sounds are normal. There is no tenderness.   Neurological: He is alert.   Skin: Skin is warm and dry.   Psychiatric: He has a normal mood and affect.   Nursing note and vitals reviewed.        Assessment:       1. Cough    2. Chronic midline low back pain without sciatica    3. Non-allergic rhinitis     4. Gastroesophageal reflux disease without esophagitis           No visits with results within 3 Month(s) from this visit.   Latest known visit with results is:   Orders Only on 10/17/2018   Component Date Value Ref Range Status    Glucose 10/17/2018 127* 70 - 99 mg/dL Final    BUN, Bld 10/17/2018 10  8 - 20 mg/dL Final    Creatinine 10/17/2018 0.90  0.60 - 1.40 mg/dL Final    Calcium 10/17/2018 9.3  7.7 - 10.4 mg/dL Final    Sodium 10/17/2018 139  134 - 144 mmol/L Final    Potassium 10/17/2018 3.9  3.5 - 5.0 mmol/L Final    Chloride 10/17/2018 107  98 - 110 mmol/L Final    CO2 10/17/2018 23.8  22.8 - 31.6 mmol/L Final    ALT (SGPT) 10/17/2018 32  3 - 33 IU/L Final    AST 10/17/2018 26  10 - 40 IU/L Final    Hemoglobin A1C 10/17/2018 6.3  3.1 - 6.5 % Final         Plan:           Cough    Chronic midline low back pain without sciatica  -     ibuprofen (ADVIL,MOTRIN) 800 MG tablet; Take 1 tablet (800 mg total) by mouth 3 (three) times daily as needed for Pain (joint pain).  Dispense: 90 tablet; Refill: 1    Non-allergic rhinitis  -     ipratropium (ATROVENT) 0.03 % nasal spray; 2 sprays by Nasal route 2 (two) times daily.  Dispense: 90 mL; Refill: 1    Gastroesophageal reflux disease without esophagitis      Patient has this persistent cough. He does have some sinus congestion. He does have some reflux symptoms which might be getting worse whenever he eats spicy and greasy food. I continue to be worried about lisinopril causing him cough. However patient has been taking this medication since 1995 without any major issues or problems.    Before I discontinue the lisinopril, I will advise him to try ipratropium nasal spray and also pantoprazole regularly for one month.     Follow-up in one month to review status.    Then I'll make a decision for further evaluation including stopping lisinopril at that point.    He'll continue to monitor his blood pressures.      Prescription for ipratropium has been sent  to mail order pharmacy which will take perhaps a few days to come.    Follow-up in one month.          Current Outpatient Medications:     aspirin (ECOTRIN) 81 MG EC tablet, Take 1 tablet by mouth Daily., Disp: , Rfl:     atorvastatin (LIPITOR) 10 MG tablet, Take 1 tablet (10 mg total) by mouth once daily., Disp: 90 tablet, Rfl: 2    hydrocortisone valerate (WEST-KIMANI) 0.2 % ointment, 1 Dose once daily., Disp: , Rfl:     lisinopril (PRINIVIL,ZESTRIL) 20 MG tablet, TAKE 1 TABLET DAILY FOR BLOOD PRESSURE, Disp: 90 tablet, Rfl: 2    loratadine (CLARITIN) 10 mg tablet, Take 1 tablet (10 mg total) by mouth once daily., Disp: 90 tablet, Rfl: 1    metFORMIN (GLUCOPHAGE) 500 MG tablet, Take 1 tablet (500 mg total) by mouth once daily., Disp: 90 tablet, Rfl: 4    NIFEdipine (ADALAT CC) 90 MG TbSR, TAKE 1 TABLET DAILY, Disp: 90 tablet, Rfl: 2    nystatin (MYCOSTATIN) cream, Apply topically 2 (two) times daily., Disp: 30 g, Rfl: 0    pantoprazole (PROTONIX) 40 MG tablet, Take 1 tablet (40 mg total) by mouth once daily., Disp: 90 tablet, Rfl: 3    sildenafil (VIAGRA) 100 MG tablet, Take 1 tablet (100 mg total) by mouth daily as needed., Disp: 24 tablet, Rfl: 3    SYSTANE ULTRA, PF, 0.4-0.3 % Dpet, , Disp: , Rfl:     tamsulosin (FLOMAX) 0.4 mg Cp24, Take 1 capsule (0.4 mg total) by mouth once daily., Disp: 90 capsule, Rfl: 3    ibuprofen (ADVIL,MOTRIN) 800 MG tablet, Take 1 tablet (800 mg total) by mouth 3 (three) times daily as needed for Pain (joint pain)., Disp: 90 tablet, Rfl: 1    ipratropium (ATROVENT) 0.03 % nasal spray, 2 sprays by Nasal route 2 (two) times daily., Disp: 90 mL, Rfl: 1

## 2019-02-15 LAB
ALT (SGPT): 26 IU/L (ref 3–33)
BUN SERPL-MCNC: 15 MG/DL (ref 8–20)
CALCIUM SERPL-MCNC: 9.2 MG/DL (ref 7.7–10.4)
CHLORIDE: 106 MMOL/L (ref 98–110)
CO2 SERPL-SCNC: 25.5 MMOL/L (ref 22.8–31.6)
CREATININE: 1.01 MG/DL (ref 0.6–1.4)
GLUCOSE: 128 MG/DL (ref 70–99)
HBA1C MFR BLD: 6.4 % (ref 3.1–6.5)
POTASSIUM SERPL-SCNC: 3.9 MMOL/L (ref 3.5–5)
SODIUM: 139 MMOL/L (ref 134–144)

## 2019-02-25 ENCOUNTER — OFFICE VISIT (OUTPATIENT)
Dept: FAMILY MEDICINE | Facility: CLINIC | Age: 64
End: 2019-02-25
Payer: OTHER GOVERNMENT

## 2019-02-25 VITALS
WEIGHT: 233 LBS | HEART RATE: 79 BPM | BODY MASS INDEX: 31.56 KG/M2 | RESPIRATION RATE: 16 BRPM | SYSTOLIC BLOOD PRESSURE: 126 MMHG | DIASTOLIC BLOOD PRESSURE: 80 MMHG | HEIGHT: 72 IN

## 2019-02-25 DIAGNOSIS — E78.00 HYPERCHOLESTEROLEMIA: ICD-10-CM

## 2019-02-25 DIAGNOSIS — R73.01 IMPAIRED FASTING GLUCOSE: ICD-10-CM

## 2019-02-25 DIAGNOSIS — I10 BENIGN ESSENTIAL HYPERTENSION: Primary | ICD-10-CM

## 2019-02-25 DIAGNOSIS — J30.1 NON-SEASONAL ALLERGIC RHINITIS DUE TO POLLEN: ICD-10-CM

## 2019-02-25 PROCEDURE — 99213 PR OFFICE/OUTPT VISIT, EST, LEVL III, 20-29 MIN: ICD-10-PCS | Mod: ,,, | Performed by: INTERNAL MEDICINE

## 2019-02-25 PROCEDURE — 99213 OFFICE O/P EST LOW 20 MIN: CPT | Mod: ,,, | Performed by: INTERNAL MEDICINE

## 2019-02-25 RX ORDER — CETIRIZINE HYDROCHLORIDE 10 MG/1
10 TABLET ORAL DAILY
Qty: 90 TABLET | Refills: 3 | Status: SHIPPED | OUTPATIENT
Start: 2019-02-25 | End: 2019-09-04 | Stop reason: ALTCHOICE

## 2019-02-25 NOTE — PATIENT INSTRUCTIONS
Taking Your Blood Pressure  Blood pressure is the force of blood against the artery wall as it moves from the heart through the blood vessels. You can take your own blood pressure reading using a digital monitor. Take your readings the same each time, using the same arm. Take readings as often as your healthcare provider instructs.  About blood pressure monitors  Blood pressure monitors are designed for certain ages and cases. You can find monitors for older adults, for pregnant women, and for children. Make sure the one you choose is the right one for your age and situation.  The American Heart Association recommends an automatic cuff monitor that fits on your upper arm (bicep). The cuff should fit your arm size. A cuff thats too large or too small will not give an accurate reading. Measure around your upper arm to find your size.  Monitors that attach to your finger or wrist are not as accurate as monitors for your upper arm.  Ask your healthcare provider for help in choosing a monitor. Bring your monitor to your next provider visit if you need help in using it the correct way.  The steps below are general instructions for using an automatic digital monitor.  Step 1. Relax    · Take your blood pressure at the same time every day, such as in the morning or evening, or at the time your healthcare provider recommends.  · Wait at least a half-hour after smoking, eating, or exercising. Don't drink coffee, tea, soda, or other caffeinated beverages before checking your blood pressure.  · Sit comfortably at a table with both feet on the floor. Do not cross your legs or feet. Place the monitor near you.  · Rest for a few minutes before you begin.  Step 2. Wrap the cuff    · Place your arm on the table, palm up. Your arm should be at the level of your heart. Wrap the cuff around your upper arm, just above your elbow. Its best done on bare skin, not over clothing. Most cuffs will indicate where the brachial artery (the  blood vessel in the middle of the arm at the inner side of the elbow) should line up with the cuff. Look in your monitor's instruction booklet for an illustration. You can also bring your cuff to your healthcare provider and have them show you how to correctly place the cuff.  Step 3. Inflate the cuff    · Push the button that starts the pump.  · The cuff will tighten, then loosen.  · The numbers will change. When they stop changing, your blood pressure reading will appear.  · Take 2 or 3 readings one minute apart.  Step 4. Write down the results of each reading    · Write down your blood pressure numbers for each reading. Note the date and time. Keep your results in one place, such as a notebook. Even if your monitor has a built-in memory, keep a hard copy of the readings.  · Remove the cuff from your arm. Turn off the machine.  · Bring your blood pressure records with your healthcare providers at each visit.  · If you start a new blood pressure medicine, note the day you started the new medicine. Also note the day if you change the dose of your medicine. This information goes on your blood pressure recording sheet. This will help your healthcare provider monitor how well the medicine changes are working.  · Ask your healthcare provider what numbers should prompt you to call him or her. Also ask what numbers should prompt you to get help right away.  Date Last Reviewed: 11/1/2016  © 0462-4148 [employer]. [address] [place] [address], [place] [address]. All rights reserved. This information is not intended as a substitute for professional medical care. Always follow your healthcare professional's instructions.

## 2019-02-25 NOTE — PROGRESS NOTES
Subjective:       Patient ID: Cipriano Gunderson is a 63 y.o. male.    Chief Complaint: Hyperlipidemia (lab review); Hypertension (lab review); Sinus Problem; and impaired blood  sugar    Hyperlipidemia   This is a chronic problem. The current episode started more than 1 year ago. The problem is controlled. Exacerbating diseases include obesity. Pertinent negatives include no chest pain, myalgias or shortness of breath. Current antihyperlipidemic treatment includes statins. The current treatment provides moderate improvement of lipids. Risk factors for coronary artery disease include male sex, obesity, dyslipidemia and hypertension.   Hypertension   This is a chronic problem. The current episode started more than 1 year ago. The problem is controlled. Pertinent negatives include no chest pain, headaches, neck pain, palpitations or shortness of breath. Risk factors for coronary artery disease include male gender and dyslipidemia. Past treatments include ACE inhibitors and calcium channel blockers.   Sinus Problem   This is a chronic problem. The current episode started more than 1 year ago. The problem has been waxing and waning since onset. There has been no fever. Pertinent negatives include no chills, congestion, coughing, diaphoresis, headaches, neck pain, shortness of breath, sneezing or sore throat. Treatments tried: claritin and zyrtec- zyrtec works better. The treatment provided moderate relief.       Past Medical History:   Diagnosis Date    Depression     Diabetes mellitus, type 2     GERD (gastroesophageal reflux disease)     Hyperlipidemia     Hypertension      Social History     Socioeconomic History    Marital status:      Spouse name: Not on file    Number of children: 2    Years of education: Not on file    Highest education level: Not on file   Social Needs    Financial resource strain: Not on file    Food insecurity - worry: Not on file    Food insecurity - inability: Not on file     Transportation needs - medical: Not on file    Transportation needs - non-medical: Not on file   Occupational History    Not on file   Tobacco Use    Smoking status: Current Some Day Smoker     Types: Cigars    Smokeless tobacco: Never Used   Substance and Sexual Activity    Alcohol use: Yes    Drug use: No    Sexual activity: Yes     Partners: Female   Other Topics Concern    Not on file   Social History Narrative    Not on file     Past Surgical History:   Procedure Laterality Date    HERNIA REPAIR      NASAL SEPTUM SURGERY       Family History   Problem Relation Age of Onset    Hypertension Mother     Cancer Mother         ??    Hypertension Father     Heart disease Father     Heart failure Father        Review of Systems   Constitutional: Negative for activity change, appetite change, chills, diaphoresis, fatigue, fever and unexpected weight change (gained 3 lbs).   HENT: Negative for congestion, drooling, facial swelling, mouth sores, sneezing and sore throat.    Eyes: Negative for pain, discharge and itching.   Respiratory: Negative for cough, chest tightness, shortness of breath, wheezing and stridor.    Cardiovascular: Negative for chest pain, palpitations and leg swelling.        Underlying stable hypertension. Occasional ectopics.   Gastrointestinal: Negative for abdominal distention, abdominal pain, anal bleeding, diarrhea and vomiting.        Stable reflux symptoms.   Endocrine: Negative for polydipsia.        Dyslipidemia-elevated blood sugar on metformin   Genitourinary: Negative for difficulty urinating, dysuria and frequency.        Itching in penis   Musculoskeletal: Positive for arthralgias and back pain. Negative for myalgias and neck pain.        Hand and wrist pains. No obvious swelling noted.  Sciatic pain   Skin: Negative for rash.   Allergic/Immunologic: Negative for environmental allergies.   Neurological: Negative for dizziness, numbness and headaches.    Psychiatric/Behavioral: Negative for dysphoric mood.         Objective:      Blood pressure 126/80, pulse 79, resp. rate 16, height 6' (1.829 m), weight 105.7 kg (233 lb). Body mass index is 31.6 kg/m².  Physical Exam   Constitutional: He appears well-developed and well-nourished.   BMI of 30   HENT:   Head: Normocephalic and atraumatic.   Mouth/Throat: No oropharyngeal exudate.   Eyes: Conjunctivae and EOM are normal.   Neck: Normal range of motion. Neck supple. No JVD present. No tracheal deviation present. No thyromegaly present.   Cardiovascular: Normal rate, regular rhythm, S1 normal, S2 normal and normal heart sounds.  Occasional extrasystoles are present. PMI is not displaced.   Pulmonary/Chest: Effort normal and breath sounds normal. No respiratory distress. He has no wheezes. He has no rales.   Abdominal: Soft. Bowel sounds are normal. There is no tenderness.   Neurological: He is alert.   Skin: Skin is warm and dry.   Psychiatric: He has a normal mood and affect.   Nursing note and vitals reviewed.        Assessment:       1. Benign essential hypertension    2. Hypercholesterolemia    3. Impaired fasting glucose    4. Non-seasonal allergic rhinitis due to pollen           Orders Only on 02/15/2019   Component Date Value Ref Range Status    Glucose 02/15/2019 128* 70 - 99 mg/dL Final    BUN, Bld 02/15/2019 15  8 - 20 mg/dL Final    Creatinine 02/15/2019 1.01  0.60 - 1.40 mg/dL Final    Calcium 02/15/2019 9.2  7.7 - 10.4 mg/dL Final    Sodium 02/15/2019 139  134 - 144 mmol/L Final    Potassium 02/15/2019 3.9  3.5 - 5.0 mmol/L Final    Chloride 02/15/2019 106  98 - 110 mmol/L Final    CO2 02/15/2019 25.5  22.8 - 31.6 mmol/L Final    ALT (SGPT) 02/15/2019 26  3 - 33 IU/L Final    Hemoglobin A1C 02/15/2019 6.4  3.1 - 6.5 % Final         Plan:           Benign essential hypertension    Hypercholesterolemia    Impaired fasting glucose  -     Hemoglobin A1c; Future; Expected date:  02/25/2019    Non-seasonal allergic rhinitis due to pollen  -     cetirizine (ZYRTEC) 10 MG tablet; Take 1 tablet (10 mg total) by mouth once daily.  Dispense: 90 tablet; Refill: 3      Patient has been advised to watch diet and exercise. Avoid fried and fatty food. Compliance to medications and follow up urged.    Advised Mr. Gunderson about age and season appropriate immunizations/ cancer screenings.  Also seasonal influenza vaccine, update on tetanus diphtheria vaccination every 10 years.          Current Outpatient Medications:     aspirin (ECOTRIN) 81 MG EC tablet, Take 1 tablet by mouth Daily., Disp: , Rfl:     atorvastatin (LIPITOR) 10 MG tablet, Take 1 tablet (10 mg total) by mouth once daily., Disp: 90 tablet, Rfl: 2    hydrocortisone valerate (WEST-KIMANI) 0.2 % ointment, 1 Dose once daily., Disp: , Rfl:     ibuprofen (ADVIL,MOTRIN) 800 MG tablet, Take 1 tablet (800 mg total) by mouth 3 (three) times daily as needed for Pain (joint pain)., Disp: 90 tablet, Rfl: 1    ipratropium (ATROVENT) 0.03 % nasal spray, 2 sprays by Nasal route 2 (two) times daily., Disp: 90 mL, Rfl: 1    metFORMIN (GLUCOPHAGE) 500 MG tablet, Take 1 tablet (500 mg total) by mouth once daily., Disp: 90 tablet, Rfl: 4    NIFEdipine (ADALAT CC) 90 MG TbSR, TAKE 1 TABLET DAILY, Disp: 90 tablet, Rfl: 2    nystatin (MYCOSTATIN) cream, Apply topically 2 (two) times daily., Disp: 30 g, Rfl: 0    pantoprazole (PROTONIX) 40 MG tablet, Take 1 tablet (40 mg total) by mouth once daily., Disp: 90 tablet, Rfl: 3    sildenafil (VIAGRA) 100 MG tablet, Take 1 tablet (100 mg total) by mouth daily as needed., Disp: 24 tablet, Rfl: 3    SYSTANE ULTRA, PF, 0.4-0.3 % Dpet, , Disp: , Rfl:     cetirizine (ZYRTEC) 10 MG tablet, Take 1 tablet (10 mg total) by mouth once daily., Disp: 90 tablet, Rfl: 3    tamsulosin (FLOMAX) 0.4 mg Cp24, Take 1 capsule (0.4 mg total) by mouth once daily., Disp: 90 capsule, Rfl: 3

## 2019-05-30 ENCOUNTER — PATIENT MESSAGE (OUTPATIENT)
Dept: FAMILY MEDICINE | Facility: CLINIC | Age: 64
End: 2019-05-30

## 2019-05-30 ENCOUNTER — TELEPHONE (OUTPATIENT)
Dept: FAMILY MEDICINE | Facility: CLINIC | Age: 64
End: 2019-05-30

## 2019-05-30 ENCOUNTER — OFFICE VISIT (OUTPATIENT)
Dept: FAMILY MEDICINE | Facility: CLINIC | Age: 64
End: 2019-05-30
Payer: OTHER GOVERNMENT

## 2019-05-30 VITALS
BODY MASS INDEX: 30.12 KG/M2 | OXYGEN SATURATION: 97 % | TEMPERATURE: 98 F | HEIGHT: 72 IN | DIASTOLIC BLOOD PRESSURE: 74 MMHG | HEART RATE: 96 BPM | SYSTOLIC BLOOD PRESSURE: 100 MMHG | WEIGHT: 222.38 LBS | RESPIRATION RATE: 14 BRPM

## 2019-05-30 DIAGNOSIS — M79.661 RIGHT CALF PAIN: Primary | ICD-10-CM

## 2019-05-30 DIAGNOSIS — M79.89 SWELLING OF RIGHT FOOT: ICD-10-CM

## 2019-05-30 PROCEDURE — 99213 OFFICE O/P EST LOW 20 MIN: CPT | Mod: ,,, | Performed by: NURSE PRACTITIONER

## 2019-05-30 PROCEDURE — 99213 PR OFFICE/OUTPT VISIT, EST, LEVL III, 20-29 MIN: ICD-10-PCS | Mod: ,,, | Performed by: NURSE PRACTITIONER

## 2019-05-30 RX ORDER — CHLORHEXIDINE GLUCONATE ORAL RINSE 1.2 MG/ML
SOLUTION DENTAL
COMMUNITY
Start: 2019-05-24 | End: 2020-01-27

## 2019-05-30 RX ORDER — HYDROCODONE BITARTRATE AND ACETAMINOPHEN 7.5; 325 MG/1; MG/1
TABLET ORAL
COMMUNITY
Start: 2019-04-05 | End: 2020-01-27

## 2019-05-30 RX ORDER — LISINOPRIL 40 MG/1
20 TABLET ORAL DAILY
COMMUNITY
Start: 2019-03-28 | End: 2023-01-24 | Stop reason: SDUPTHER

## 2019-05-30 RX ORDER — DIAZEPAM 10 MG/1
10 TABLET ORAL DAILY
Refills: 0 | COMMUNITY
Start: 2019-04-03 | End: 2020-01-27

## 2019-05-30 RX ORDER — TRIAZOLAM 0.25 MG/1
0.25 TABLET ORAL DAILY
Refills: 0 | COMMUNITY
Start: 2019-04-03 | End: 2020-01-27

## 2019-05-30 NOTE — PROGRESS NOTES
"    SUBJECTIVE:      Patient ID: Cipriano Gunderson is a 64 y.o. male.    Chief Complaint: Foot Swelling    C/o new onset RLE edema - states he returned from recent cruise to [facility] several days ago, noticed edema yesterday, last time he exercised was last week, denies pain/numbness/tingling to foot, states swelling has improved somewhat, has been "laid up" with dental surgery (implants started 4/1) the past 6 weeks so he hasn't been as active as normal & eating less healthy (especially during vacation)    11# loss since last visit the end of February with Dr. Alberto (PCP)        Edema   This is a new problem. The current episode started yesterday. The problem occurs constantly. The problem has been gradually improving. Associated symptoms include arthralgias and myalgias. Pertinent negatives include no abdominal pain, chest pain, congestion, coughing, fatigue, fever, headaches, joint swelling, nausea, neck pain, rash, sore throat, vomiting or weakness. He has tried nothing for the symptoms.       Past Surgical History:   Procedure Laterality Date    DENTAL SURGERY  04/05/2019 5/24/2019    HERNIA REPAIR      NASAL SEPTUM SURGERY       Family History   Problem Relation Age of Onset    Hypertension Mother     Cancer Mother         ??    Hypertension Father     Heart disease Father     Heart failure Father       Social History     Socioeconomic History    Marital status:      Spouse name: Not on file    Number of children: 2    Years of education: Not on file    Highest education level: Not on file   Occupational History    Not on file   Social Needs    Financial resource strain: Not on file    Food insecurity:     Worry: Not on file     Inability: Not on file    Transportation needs:     Medical: Not on file     Non-medical: Not on file   Tobacco Use    Smoking status: Never Smoker    Smokeless tobacco: Never Used   Substance and Sexual Activity    Alcohol use: Yes    Drug use: No    " Sexual activity: Yes     Partners: Female   Lifestyle    Physical activity:     Days per week: Not on file     Minutes per session: Not on file    Stress: Not on file   Relationships    Social connections:     Talks on phone: Not on file     Gets together: Not on file     Attends Church service: Not on file     Active member of club or organization: Not on file     Attends meetings of clubs or organizations: Not on file     Relationship status: Not on file   Other Topics Concern    Not on file   Social History Narrative    Not on file     Current Outpatient Medications   Medication Sig Dispense Refill    aspirin (ECOTRIN) 81 MG EC tablet Take 1 tablet by mouth Daily.      cetirizine (ZYRTEC) 10 MG tablet Take 1 tablet (10 mg total) by mouth once daily. 90 tablet 3    chlorhexidine (PERIDEX) 0.12 % solution       diazePAM (VALIUM) 10 MG Tab Take 10 mg by mouth once daily.  0    HYDROcodone-acetaminophen (NORCO) 7.5-325 mg per tablet       hydrocortisone valerate (WEST-KIMANI) 0.2 % ointment 1 Dose once daily.      ibuprofen (ADVIL,MOTRIN) 800 MG tablet Take 1 tablet (800 mg total) by mouth 3 (three) times daily as needed for Pain (joint pain). 90 tablet 1    ipratropium (ATROVENT) 0.03 % nasal spray 2 sprays by Nasal route 2 (two) times daily. 90 mL 1    metFORMIN (GLUCOPHAGE) 500 MG tablet Take 1 tablet (500 mg total) by mouth once daily. 90 tablet 4    NIFEdipine (ADALAT CC) 90 MG TbSR TAKE 1 TABLET DAILY 90 tablet 2    nystatin (MYCOSTATIN) cream Apply topically 2 (two) times daily. 30 g 0    pantoprazole (PROTONIX) 40 MG tablet Take 1 tablet (40 mg total) by mouth once daily. 90 tablet 3    sildenafil (VIAGRA) 100 MG tablet Take 1 tablet (100 mg total) by mouth daily as needed. 24 tablet 3    SYSTANE ULTRA, PF, 0.4-0.3 % Dpet       atorvastatin (LIPITOR) 10 MG tablet Take 1 tablet (10 mg total) by mouth once daily. 90 tablet 2    lisinopril (PRINIVIL,ZESTRIL) 40 MG tablet Take 40 mg by mouth  once daily.      tamsulosin (FLOMAX) 0.4 mg Cp24 Take 1 capsule (0.4 mg total) by mouth once daily. 90 capsule 3    triazolam (HALCION) 0.25 MG Tab Take 0.25 mg by mouth once daily.  0     No current facility-administered medications for this visit.      Review of patient's allergies indicates:  No Known Allergies   Past Medical History:   Diagnosis Date    Depression     Diabetes mellitus, type 2     GERD (gastroesophageal reflux disease)     Hyperlipidemia     Hypertension      Past Surgical History:   Procedure Laterality Date    DENTAL SURGERY  04/05/2019 5/24/2019    HERNIA REPAIR      NASAL SEPTUM SURGERY         Review of Systems   Constitutional: Negative for activity change, appetite change, fatigue and fever.   HENT: Negative for congestion, ear pain, hearing loss, postnasal drip, sinus pressure, sinus pain, sneezing and sore throat.    Eyes: Negative for photophobia and pain.   Respiratory: Negative for cough, chest tightness, shortness of breath and wheezing.    Cardiovascular: Positive for leg swelling (R foot). Negative for chest pain.   Gastrointestinal: Negative for abdominal distention, abdominal pain, blood in stool, constipation, diarrhea, nausea and vomiting.   Endocrine: Negative for cold intolerance, heat intolerance, polydipsia and polyuria.   Genitourinary: Negative for difficulty urinating, dysuria, flank pain, frequency, hematuria and urgency.   Musculoskeletal: Positive for arthralgias and myalgias. Negative for back pain, joint swelling and neck pain.   Skin: Negative for pallor and rash.   Allergic/Immunologic: Positive for environmental allergies. Negative for food allergies.   Neurological: Negative for dizziness, weakness, light-headedness and headaches.   Hematological: Does not bruise/bleed easily.   Psychiatric/Behavioral: Negative for confusion, decreased concentration and sleep disturbance. The patient is not nervous/anxious.       OBJECTIVE:      Vitals:    05/30/19  1257   BP: 100/74   Pulse: 96   Resp: 14   Temp: 98.4 °F (36.9 °C)   SpO2: 97%   Weight: 100.9 kg (222 lb 6.4 oz)   Height: 6' (1.829 m)     Physical Exam   Constitutional: He is oriented to person, place, and time. Vital signs are normal. He appears well-developed and well-nourished. No distress.   obese   HENT:   Head: Normocephalic and atraumatic.   Right Ear: Hearing normal.   Left Ear: Hearing normal.   Nose: Nose normal. No rhinorrhea.   Mouth/Throat: Mucous membranes are normal.   Eyes: Pupils are equal, round, and reactive to light. Conjunctivae and lids are normal. Right eye exhibits no discharge. Left eye exhibits no discharge. Right conjunctiva is not injected. Left conjunctiva is not injected. Right pupil is round and reactive. Left pupil is round and reactive. Pupils are equal.   Neck: Trachea normal and normal range of motion. Neck supple. No JVD present. No tracheal deviation present. No thyromegaly present.   Cardiovascular: Normal rate, regular rhythm, normal heart sounds and intact distal pulses. Exam reveals no gallop and no friction rub.   No murmur heard.  Pulses:       Radial pulses are 2+ on the right side, and 2+ on the left side.   Pulmonary/Chest: Effort normal and breath sounds normal. No stridor. No respiratory distress. He has no decreased breath sounds. He has no wheezes. He has no rhonchi. He has no rales.   Abdominal: Soft. Bowel sounds are normal. He exhibits no distension. There is no tenderness. There is no rigidity and no guarding.   Musculoskeletal: Normal range of motion. He exhibits edema (+1/4 R plantar surface of foot) and tenderness (R calf).   Lymphadenopathy:     He has no cervical adenopathy.   Neurological: He is alert and oriented to person, place, and time. He has normal strength. He displays no atrophy. He displays a negative Romberg sign. Coordination and gait normal.   Skin: Skin is warm and dry. Capillary refill takes less than 2 seconds. No lesion and no rash  noted. No cyanosis. No pallor.   Psychiatric: He has a normal mood and affect. His speech is normal and behavior is normal. Judgment and thought content normal. Cognition and memory are normal. He is attentive.   Nursing note and vitals reviewed.     Assessment:       1. Right calf pain    2. Swelling of right foot        Plan:       Right calf pain  -     US Lower Extremity Veins Right    Swelling of right foot  -     US Lower Extremity Veins Right    Discussed elevating extremity, continuing to stay well-hydrated, avoiding strenuous exercise. Will call with results of imaging study.      Follow up if symptoms worsen or fail to improve.      6/3/2019 NEYDA Fitzgerald, FNP-C

## 2019-05-30 NOTE — PATIENT INSTRUCTIONS
Leg Swelling in a Single Leg  Swelling of the arms, feet, ankles, and legs is called edema. It is caused by extra fluid collecting in the tissues. Because of gravity, extra fluid in the body settles to the lowest part. That is why the legs and feet are most affected. You have swelling in a single leg.  Some of the causes for swelling in only a single leg include:  · Infection in the foot or leg  · Long-term problem with a vein not working well (venous insufficiency)  · Swollen, twisted vein in the leg (varicose veins)  · Insect bite or sting on the foot or leg  · Injury or recent surgery on the foot or leg  · Blood clot in a deep vein of the leg (deep vein thrombosis or DVT)  · Inflammation of the joints of the lower leg  Medical treatment will depend on what is causing your swelling.  Home care  Follow these guidelines when caring for yourself at home:  · Dont wear tight clothing.  · Keep your legs up while lying or sitting.  · Take any medicines as directed.  · If infection, injury, or recent surgery is the cause of your swelling, stay off your legs as much as possible until your symptoms get better.  · If you have venous insufficiency or varicose veins, dont sit or  one place for long periods of time. Take breaks and walk around every few hours. Talk with your healthcare provider about wearing support stockings to help lessen swelling during the day.  · Wear compression stockings with your doctor's approval  Follow-up care  Follow up with your healthcare provider as advised.  Call 911  Call 911 if any of these occur:  · Shortness of breath or trouble breathing  · Chest pain  · Coughing up blood  · Fainting or loss of consciousness   When to seek medical advice  Call your healthcare provider right away if any of these occur:  · Increased pain, swelling, warmth, or redness of the leg, ankle, or foot  · Fever of 100.4°F (38ºC) or higher, or as directed by your healthcare provider  · Weakness or  dizziness  · Shaking chills  · Drenching sweats  Date Last Reviewed: 4/11/2016  © 5692-4300 [employer]. [address] [place] [address], [place] [address]. All rights reserved. This information is not intended as a substitute for professional medical care. Always follow your healthcare professional's instructions.

## 2019-06-03 ENCOUNTER — TELEPHONE (OUTPATIENT)
Dept: FAMILY MEDICINE | Facility: CLINIC | Age: 64
End: 2019-06-03

## 2019-06-03 ENCOUNTER — PATIENT MESSAGE (OUTPATIENT)
Dept: FAMILY MEDICINE | Facility: CLINIC | Age: 64
End: 2019-06-03

## 2019-06-05 ENCOUNTER — PATIENT MESSAGE (OUTPATIENT)
Dept: FAMILY MEDICINE | Facility: CLINIC | Age: 64
End: 2019-06-05

## 2019-06-05 DIAGNOSIS — E78.00 HYPERCHOLESTEROLEMIA: Primary | ICD-10-CM

## 2019-06-07 NOTE — TELEPHONE ENCOUNTER
Lipid panel and ALT at patient request    That he wants to make a decision about Lipitor based upon these labs.    I hope it does not discontinue the medication based upon the labs. Unless and until liver enzyme is abnormal.

## 2019-06-17 ENCOUNTER — TELEPHONE (OUTPATIENT)
Dept: FAMILY MEDICINE | Facility: CLINIC | Age: 64
End: 2019-06-17

## 2019-06-17 LAB
ALT (SGPT): 54 IU/L (ref 3–33)
HBA1C MFR BLD: 6.4 % (ref 3.1–6.5)

## 2019-06-17 NOTE — TELEPHONE ENCOUNTER
----- Message from Chuckie Alberto MD sent at 6/17/2019  4:26 PM CDT -----  The results are within acceptable range.  Please keep regular follow up.

## 2019-06-25 ENCOUNTER — OFFICE VISIT (OUTPATIENT)
Dept: FAMILY MEDICINE | Facility: CLINIC | Age: 64
End: 2019-06-25
Payer: OTHER GOVERNMENT

## 2019-06-25 VITALS
SYSTOLIC BLOOD PRESSURE: 122 MMHG | HEIGHT: 72 IN | BODY MASS INDEX: 29.93 KG/M2 | HEART RATE: 90 BPM | DIASTOLIC BLOOD PRESSURE: 71 MMHG | WEIGHT: 221 LBS

## 2019-06-25 DIAGNOSIS — E78.00 HYPERCHOLESTEROLEMIA: ICD-10-CM

## 2019-06-25 DIAGNOSIS — I10 BENIGN ESSENTIAL HYPERTENSION: Primary | ICD-10-CM

## 2019-06-25 DIAGNOSIS — R73.01 IMPAIRED FASTING GLUCOSE: ICD-10-CM

## 2019-06-25 DIAGNOSIS — R74.01 ELEVATED ALT MEASUREMENT: ICD-10-CM

## 2019-06-25 PROCEDURE — 99213 OFFICE O/P EST LOW 20 MIN: CPT | Mod: ,,, | Performed by: INTERNAL MEDICINE

## 2019-06-25 PROCEDURE — 99213 PR OFFICE/OUTPT VISIT, EST, LEVL III, 20-29 MIN: ICD-10-PCS | Mod: ,,, | Performed by: INTERNAL MEDICINE

## 2019-06-25 NOTE — PATIENT INSTRUCTIONS
"  ALT  Does this test have other names?  Alanine aminotransferase, serum glutamic-pyruvic transaminase, SGPT  What is this test?  This test measures the amount of the enzyme alanine aminotransferase (ALT) in your blood.  ALT, formerly called SGPT, is mostly found in your liver cells. When liver cells are injured, they release this enzyme into your blood. High levels are a sign of liver damage.  This test is part of a group of tests commonly referred to as "liver function tests." Results of these tests give health care providers an overall picture of how well your liver is working.  Why do I need this test?  You may have this test to see if you have a liver disease, such as hepatitis. Symptoms of liver diseases include:  · Extreme tiredness  · Yellowing of the eyes and skin, a condition called jaundice  · Abdominal (belly) pain  · Nausea and vomiting  · Diarrhea  · Headache  You may also have this test to look for cirrhosis, which causes damage and scarring to the liver. Causes of cirrhosis include long-term hepatitis infection, excessive alcohol use, obesity, and exposure to certain medications or toxins. Symptoms of cirrhosis include:  · Abdominal swelling from fluid buildup  · Visible blood vessels in the skin  · Itchy skin  · Swelling of the legs, feet or ankles  · Nausea, loss of appetite  · Weight loss  · Fatigue or feeling tired  What other tests might I have along with this test?  Your health care provider may also order other tests of liver health, including:  · Albumin  · Bilirubin  · Alkaline phosphatase,  or  ALP  · Aspartate aminotransferase, or AST  · Prothrombin time, or  PT  Your health care provider may also order other tests that measure:  · Your liver's ability to process substances from your blood  · Levels of substances your liver produces  · Liver inflammation  What do my test results mean?  Many things may affect your lab test results. These include the method each lab uses to do the test. Even " if your test results are different from the normal value, you may not have a problem. To learn what the results mean for you, talk with your health care provider.  ALT levels are normally less than 40 international units per liter (IU/L). Levels above 1,000 IU/L may be a sign of:  · Acute viral hepatitis  · Lack of blood flow to the liver  · Injuries from drugs or toxins  The ratio of AST to ALT may also provide helpful information to your health care provider. AST levels are normally lower than ALT levels. AST is often higher than ALT in cases such as:  · Hepatitis from alcohol use  · Cirrhosis in people with long-term viral hepatitis  A number of other medical conditions besides liver disease can also cause liver enzymes to rise. These include:  · Muscle diseases  · Celiac disease  · Thyroid problems  · Adrenal gland problems  How is this test done?  The test requires a blood sample, which is drawn through a needle from a vein in your arm.  Does this test pose any risks?  Taking a blood sample with a needle carries risks that include bleeding, infection, bruising, or feeling dizzy. When the needle pricks your arm, you may feel a slight stinging sensation or pain. Afterward, the site may be slightly sore.  What might affect my test results?  Many medications can affect your test results, as can drinking alcohol.  How do I get ready for this test?  Your health care provider may ask you to not to eat or drink and avoid medications before your blood tests. Be sure your health care providers knows about all medicines, herbs, vitamins, and supplements you are taking. This includes medicines that don't need a prescription and any illicit drugs you may use.  © 6609-3479 [employer]. [address] [place] [address], [place] [address]. All rights reserved. This information is not intended as a substitute for professional medical care. Always follow your healthcare professional's instructions.        Liver Panel  Does  this test have other names?  Liver function test (LFT); hepatic function test; liver function panel (LFP); Alb, Tbil, Dbil, Alk Phos, ALT, Tot Protein  What is this test?  This group of tests measures specific proteins and enzymes in your blood.  Your liver is the second largest organ in your body. It converts the food you eat into energy and nutrients and filters waste from your blood.  A liver panel checks the health of your liver and can help diagnose liver damage or disease. The panel consists of these tests:  · Albumin. Albumin is a protein made in the liver.  · Alkaline phosphatase (ALP). ALP is an enzyme found in high quantities in your liver, bile duct, and elsewhere in your body.  · Alanine transaminase (ALT). ALT is another enzyme found in your liver.  · Aspartate transaminase (AST). AST is another enzyme found in your liver, heart, and muscles.  · Total protein. Total protein measures the amount of protein in your blood. Globulin and albumin are the two main proteins found in the blood.  Liver panel or liver function tests also measure bilirubin, a waste product that forms when red blood cells break down. This test measures total bilirubin and direct bilirubin. Total bilirubin is the amount of bilirubin in your blood. Direct bilirubin measures the bilirubin that is made in your liver.    Why do I need this test?  You may have this test as part of a routine checkup. You may also have this test if you have symptoms of liver disease or damage. Symptoms include:  · Yellow color of your skin and the whites of your eyes (jaundice)  · Dark-colored urine  · Light-colored or black and bloody bowel movements  · Nausea or vomiting  · Diarrhea  · Loss of appetite  · Abdominal swelling or pain  · Weight loss or gain  · Fatigue  You also might have this test if you've been exposed to a hepatitis virus, have a family history of liver disease, drink excessive amounts of alcohol, or take medicines that can cause liver  damage.   You may also need this test to help your healthcare provider watch the progression of a virus like hepatitis that affects the liver or liver damage caused by alcohol.  What other tests might I have along with this test?  Your healthcare provider may also order these tests:  · Gamma-glutamyl transpeptidase (GGT). GGT is another enzyme found in large amounts in your liver, bile ducts, and pancreas.  · Prothrombin time. Prothrombin is protein made in your liver. It helps blood to clot. A prothrombin time test measures how long it takes your blood to clot.  What do my test results mean?  Many things may affect your lab test results. These include the method each lab uses to do the test. Even if your test results are different from the normal value, you may not have a problem. To learn what the results mean for you, talk with your health are provider.  Normal ranges for liver function tests vary depending on various factors, including your age and gender.  Results are given in grams per deciliter (g/dL), milligrams per deciliter (mg/dL), units per liter (U/L), or international units per liter (IU/L). Here is an example of normal findings for adults:  · Albumin: 3.5 to 5 g/dL  · Total bilirubin: 0.3 to 1.0 mg/dL  · Direct bilirubin: 0.1 to 0.3 mg/dL  · ALP: 30 to 120 U/L  · ALT: 4 to 36 IU/L  · AST: 0 to 35 U/L  · Total protein: 6.4 to 8.3 g/dL  High levels of these proteins and enzymes may mean that your liver or bile duct is damaged or diseased. Overall, a pattern of abnormalities among the results may be more significant than the individual test results. But levels of ALT are useful for finding out whether you have hepatitis.  Diseases unrelated to the liver can cause abnormal test results. Some people with advanced liver disease may have normal test results.  How is this test done?  The test requires a blood sample, which is drawn through a needle from a vein in your arm.  Does this test pose any  risks?  Taking a blood sample with a needle carries risks that include bleeding, infection, bruising, or feeling dizzy. When the needle pricks your arm, you may feel a slight stinging sensation or pain. Afterward, the site may be slightly sore.  What might affect my test results?  Your results can be affected by:  · Viruses  · How much alcohol you drink  · Certain medicines  · Obesity  How do I get ready for this test?  You don't need to prepare for this test. But the test may be more accurate if you fast for 10 to 12 hours beforehand. Be sure your healthcare provider knows about all medicines, herbs, vitamins, and supplements you are taking. This includes medicines that don't need a prescription and any illicit drugs you may use.    © 9309-3138 [employer]. [address] [place] [address], [place] [address]. All rights reserved. This information is not intended as a substitute for professional medical care. Always follow your healthcare professional's instructions.

## 2019-06-25 NOTE — PROGRESS NOTES
Subjective:       Patient ID: Cipriano Gunderson is a 64 y.o. male.    Chief Complaint: Hypertension (lab review ); Hyperlipidemia; and Abnormal ALT    Mr. Cipriano Gunderson is a 64-year-old male who comes for follow-up. Underlying issues of hypertension, dyslipidemia have been noted. He is going through extensive dental workup at this point. He has to have dental implants also.    His blood pressures are under good control. His recent lipid panel also shows a good control. Liver enzyme ear disease elevated. He does not drink significant amount of alcohol. In fact he has stopped drinking alcohol for the last 1 month or so. He has taken a lot of ibuprofens of the dental procedure.    Please note that he is on Lipitor 10 mg for lowering his cholesterol.    Otherwise no other major medical issues. His weight is stable. No significant gain or loss of weight. No nausea or vomiting. No abdominal pain.        Hypertension   This is a chronic problem. The current episode started more than 1 year ago. The problem is controlled. Pertinent negatives include no chest pain, headaches, neck pain, palpitations or shortness of breath. Risk factors for coronary artery disease include male gender and dyslipidemia. The current treatment provides moderate improvement. There is no history of hyperaldosteronism, hypercortisolism, pheochromocytoma or renovascular disease.   Hyperlipidemia   This is a chronic problem. The current episode started more than 1 year ago. The problem is controlled. Exacerbating diseases include liver disease (ALt elevated). He has no history of obesity. Pertinent negatives include no chest pain, myalgias or shortness of breath.       Past Medical History:   Diagnosis Date    Depression     Diabetes mellitus, type 2     GERD (gastroesophageal reflux disease)     Hyperlipidemia     Hypertension      Social History     Socioeconomic History    Marital status:      Spouse name: Julieta    Number of children: 2     Years of education: Not on file    Highest education level: Not on file   Occupational History    Not on file   Social Needs    Financial resource strain: Not on file    Food insecurity:     Worry: Not on file     Inability: Not on file    Transportation needs:     Medical: Not on file     Non-medical: Not on file   Tobacco Use    Smoking status: Never Smoker    Smokeless tobacco: Never Used   Substance and Sexual Activity    Alcohol use: Yes    Drug use: No    Sexual activity: Yes     Partners: Female   Lifestyle    Physical activity:     Days per week: Not on file     Minutes per session: Not on file    Stress: Not at all   Relationships    Social connections:     Talks on phone: Not on file     Gets together: Not on file     Attends [employer] service: Not on file     Active member of club or organization: Not on file     Attends meetings of clubs or organizations: Not on file     Relationship status: Not on file   Other Topics Concern    Not on file   Social History Narrative    Not on file     Past Surgical History:   Procedure Laterality Date    DENTAL SURGERY  04/05/2019 5/24/2019    HERNIA REPAIR      NASAL SEPTUM SURGERY       Family History   Problem Relation Age of Onset    Hypertension Mother     Cancer Mother         ??    Hypertension Father     Heart disease Father     Heart failure Father        Review of Systems   Constitutional: Negative for activity change, appetite change, chills, diaphoresis, fatigue, fever and unexpected weight change (No significant change in weight.).   HENT: Positive for dental problem. Negative for congestion, drooling, facial swelling, mouth sores, sneezing and sore throat.         Patient is undergoing dental procedures with implants.   Eyes: Negative for pain, discharge and itching.   Respiratory: Negative for cough, chest tightness, shortness of breath, wheezing and stridor.    Cardiovascular: Negative for chest pain, palpitations and leg  swelling.        Underlying stable hypertension. Occasional ectopics.   Gastrointestinal: Negative for abdominal distention, abdominal pain, anal bleeding, diarrhea and vomiting.        Stable reflux symptoms. Liver enzyme ALT slightly elevated.   Endocrine: Negative for polydipsia.        Dyslipidemia-elevated blood sugar on metformin   Genitourinary: Negative for difficulty urinating, dysuria and frequency.        Itching in penis   Musculoskeletal: Positive for arthralgias and back pain. Negative for myalgias and neck pain.        Hand and wrist pains. No obvious swelling noted.  Sciatic pain   Skin: Negative for rash.   Allergic/Immunologic: Negative for environmental allergies.   Neurological: Negative for dizziness, numbness and headaches.   Psychiatric/Behavioral: Negative for dysphoric mood.         Objective:      Blood pressure 127/81, pulse 90, height 6' (1.829 m), weight 100.2 kg (221 lb). Body mass index is 29.97 kg/m².  Physical Exam      Assessment:       1. Benign essential hypertension    2. Hypercholesterolemia    3. Impaired fasting glucose    4. Elevated ALT measurement           Orders Only on 06/17/2019   Component Date Value Ref Range Status    ALT (SGPT) 06/17/2019 54* 3 - 33 IU/L Final    Hemoglobin A1C 06/17/2019 6.4  3.1 - 6.5 % Final         Plan:           Benign essential hypertension    Hypercholesterolemia    Impaired fasting glucose    Elevated ALT measurement  -     Hepatic function panel; Future; Expected date: 06/25/2019      Patient's blood pressures are good. Cholesterol level is also good. ALT slightly elevated at 54. Before I stop any medications which are important for him, I will recheck a hepatic function panel in about 3-4 weeks time. I'll notify him of the results and if any action is needs to be taken.    Please note that last year we had screened him for hepatitis C as a routine measurement which was negative.    At this point except for the dental issues patient is  otherwise doing fairly well.      Current Outpatient Medications:     aspirin (ECOTRIN) 81 MG EC tablet, Take 1 tablet by mouth Daily., Disp: , Rfl:     atorvastatin (LIPITOR) 10 MG tablet, Take 1 tablet (10 mg total) by mouth once daily., Disp: 90 tablet, Rfl: 2    cetirizine (ZYRTEC) 10 MG tablet, Take 1 tablet (10 mg total) by mouth once daily., Disp: 90 tablet, Rfl: 3    chlorhexidine (PERIDEX) 0.12 % solution, , Disp: , Rfl:     diazePAM (VALIUM) 10 MG Tab, Take 10 mg by mouth once daily., Disp: , Rfl: 0    HYDROcodone-acetaminophen (NORCO) 7.5-325 mg per tablet, , Disp: , Rfl:     hydrocortisone valerate (WEST-KIMANI) 0.2 % ointment, 1 Dose once daily., Disp: , Rfl:     ibuprofen (ADVIL,MOTRIN) 800 MG tablet, Take 1 tablet (800 mg total) by mouth 3 (three) times daily as needed for Pain (joint pain)., Disp: 90 tablet, Rfl: 1    ipratropium (ATROVENT) 0.03 % nasal spray, 2 sprays by Nasal route 2 (two) times daily., Disp: 90 mL, Rfl: 1    lisinopril (PRINIVIL,ZESTRIL) 40 MG tablet, Take 40 mg by mouth once daily., Disp: , Rfl:     metFORMIN (GLUCOPHAGE) 500 MG tablet, Take 1 tablet (500 mg total) by mouth once daily., Disp: 90 tablet, Rfl: 4    NIFEdipine (ADALAT CC) 90 MG TbSR, TAKE 1 TABLET DAILY, Disp: 90 tablet, Rfl: 2    nystatin (MYCOSTATIN) cream, Apply topically 2 (two) times daily., Disp: 30 g, Rfl: 0    pantoprazole (PROTONIX) 40 MG tablet, Take 1 tablet (40 mg total) by mouth once daily., Disp: 90 tablet, Rfl: 3    sildenafil (VIAGRA) 100 MG tablet, Take 1 tablet (100 mg total) by mouth daily as needed., Disp: 24 tablet, Rfl: 3    SYSTANE ULTRA, PF, 0.4-0.3 % Dpet, , Disp: , Rfl:     tamsulosin (FLOMAX) 0.4 mg Cp24, Take 1 capsule (0.4 mg total) by mouth once daily., Disp: 90 capsule, Rfl: 3    triazolam (HALCION) 0.25 MG Tab, Take 0.25 mg by mouth once daily., Disp: , Rfl: 0

## 2019-07-09 ENCOUNTER — PATIENT MESSAGE (OUTPATIENT)
Dept: FAMILY MEDICINE | Facility: CLINIC | Age: 64
End: 2019-07-09

## 2019-07-17 ENCOUNTER — PATIENT MESSAGE (OUTPATIENT)
Dept: FAMILY MEDICINE | Facility: CLINIC | Age: 64
End: 2019-07-17

## 2019-07-20 ENCOUNTER — PATIENT MESSAGE (OUTPATIENT)
Dept: FAMILY MEDICINE | Facility: CLINIC | Age: 64
End: 2019-07-20

## 2019-09-04 RX ORDER — LORATADINE 10 MG/1
TABLET ORAL
Qty: 90 TABLET | Refills: 4 | Status: SHIPPED | OUTPATIENT
Start: 2019-09-04 | End: 2020-01-27

## 2019-09-04 RX ORDER — ATORVASTATIN CALCIUM 10 MG/1
TABLET, FILM COATED ORAL
Qty: 90 TABLET | Refills: 4 | Status: SHIPPED | OUTPATIENT
Start: 2019-09-04 | End: 2020-12-03

## 2019-09-18 ENCOUNTER — LAB VISIT (OUTPATIENT)
Dept: LAB | Facility: HOSPITAL | Age: 64
End: 2019-09-18
Attending: INTERNAL MEDICINE
Payer: OTHER GOVERNMENT

## 2019-09-18 DIAGNOSIS — R74.01 NONSPECIFIC ELEVATION OF LEVELS OF TRANSAMINASE OR LACTIC ACID DEHYDROGENASE (LDH): Primary | ICD-10-CM

## 2019-09-18 DIAGNOSIS — R74.02 NONSPECIFIC ELEVATION OF LEVELS OF TRANSAMINASE OR LACTIC ACID DEHYDROGENASE (LDH): Primary | ICD-10-CM

## 2019-09-18 LAB
ALBUMIN SERPL BCP-MCNC: 4.4 G/DL (ref 3.5–5.2)
ALP SERPL-CCNC: 77 U/L (ref 55–135)
ALT SERPL W/O P-5'-P-CCNC: 29 U/L (ref 10–44)
AST SERPL-CCNC: 28 U/L (ref 10–40)
BILIRUB DIRECT SERPL-MCNC: 0.1 MG/DL (ref 0.1–0.3)
BILIRUB SERPL-MCNC: 0.7 MG/DL (ref 0.1–1)
PROT SERPL-MCNC: 7.5 G/DL (ref 6–8.4)

## 2019-09-18 PROCEDURE — 36415 COLL VENOUS BLD VENIPUNCTURE: CPT

## 2019-09-18 PROCEDURE — 80076 HEPATIC FUNCTION PANEL: CPT

## 2019-09-19 ENCOUNTER — TELEPHONE (OUTPATIENT)
Dept: FAMILY MEDICINE | Facility: CLINIC | Age: 64
End: 2019-09-19

## 2019-09-19 NOTE — TELEPHONE ENCOUNTER
----- Message from Chuckie Alberto MD sent at 9/18/2019  4:54 PM CDT -----  The results are within acceptable range.  Please keep regular follow up.

## 2019-09-24 DIAGNOSIS — K21.9 GASTROESOPHAGEAL REFLUX DISEASE WITHOUT ESOPHAGITIS: ICD-10-CM

## 2019-09-24 RX ORDER — PANTOPRAZOLE SODIUM 40 MG/1
TABLET, DELAYED RELEASE ORAL
Qty: 90 TABLET | Refills: 1 | Status: SHIPPED | OUTPATIENT
Start: 2019-09-24 | End: 2020-01-27

## 2019-09-25 ENCOUNTER — PATIENT MESSAGE (OUTPATIENT)
Dept: FAMILY MEDICINE | Facility: CLINIC | Age: 64
End: 2019-09-25

## 2019-09-25 ENCOUNTER — OFFICE VISIT (OUTPATIENT)
Dept: FAMILY MEDICINE | Facility: CLINIC | Age: 64
End: 2019-09-25
Payer: OTHER GOVERNMENT

## 2019-09-25 VITALS
SYSTOLIC BLOOD PRESSURE: 134 MMHG | BODY MASS INDEX: 30.48 KG/M2 | WEIGHT: 225 LBS | DIASTOLIC BLOOD PRESSURE: 86 MMHG | HEART RATE: 77 BPM | HEIGHT: 72 IN

## 2019-09-25 DIAGNOSIS — Z23 NEEDS FLU SHOT: ICD-10-CM

## 2019-09-25 DIAGNOSIS — M70.21 OLECRANON BURSITIS OF RIGHT ELBOW: ICD-10-CM

## 2019-09-25 DIAGNOSIS — I10 BENIGN ESSENTIAL HYPERTENSION: Primary | ICD-10-CM

## 2019-09-25 PROBLEM — R05.9 COUGH: Status: RESOLVED | Noted: 2019-01-31 | Resolved: 2019-09-25

## 2019-09-25 PROBLEM — F17.200 CURRENT SMOKER: Status: RESOLVED | Noted: 2017-07-04 | Resolved: 2019-09-25

## 2019-09-25 PROBLEM — J06.9 UPPER RESPIRATORY TRACT INFECTION: Status: RESOLVED | Noted: 2019-01-02 | Resolved: 2019-09-25

## 2019-09-25 PROCEDURE — 90686 IIV4 VACC NO PRSV 0.5 ML IM: CPT | Mod: PBBFAC | Performed by: INTERNAL MEDICINE

## 2019-09-25 PROCEDURE — 99213 OFFICE O/P EST LOW 20 MIN: CPT | Mod: PBBFAC | Performed by: INTERNAL MEDICINE

## 2019-09-25 PROCEDURE — 99999 PR PBB SHADOW E&M-EST. PATIENT-LVL III: ICD-10-PCS | Mod: PBBFAC,,, | Performed by: INTERNAL MEDICINE

## 2019-09-25 PROCEDURE — 99213 OFFICE O/P EST LOW 20 MIN: CPT | Mod: S$PBB,,, | Performed by: INTERNAL MEDICINE

## 2019-09-25 PROCEDURE — 99999 PR PBB SHADOW E&M-EST. PATIENT-LVL III: CPT | Mod: PBBFAC,,, | Performed by: INTERNAL MEDICINE

## 2019-09-25 PROCEDURE — 99213 PR OFFICE/OUTPT VISIT, EST, LEVL III, 20-29 MIN: ICD-10-PCS | Mod: S$PBB,,, | Performed by: INTERNAL MEDICINE

## 2019-09-25 NOTE — PROGRESS NOTES
Subjective:       Patient ID: Cipriano Gunderson is a 64 y.o. male.    Chief Complaint: Hypertension and Mass (elbow )    Mr. Cipriano Huggins is a 64-year-old gentleman who comes for follow-up.  Underlying hypertension, dyslipidemia, elevated blood sugar have been noted.    Recently has noticed swelling in the right elbow region on the posterior aspect of the elbow joint.  While he does not recall any direct fall or injury, he does significant exercise with weights in dumbbells.  This involves flexion and extension of the elbow repeatedly.    Blood pressures in the office are slightly elevated but at home they seem to be okay.  He is compliant to his medications.    Hypertension   This is a chronic problem. The current episode started more than 1 year ago. The problem is controlled. Pertinent negatives include no chest pain, headaches, neck pain, palpitations or shortness of breath. Risk factors for coronary artery disease include male gender and dyslipidemia. Past treatments include calcium channel blockers. The current treatment provides moderate improvement.   Elbow Injury   This is a new problem. The current episode started 1 to 4 weeks ago. The problem occurs constantly. The problem has been unchanged. Associated symptoms include arthralgias. Pertinent negatives include no abdominal pain, chest pain, chills, congestion, coughing, diaphoresis, fatigue, fever, headaches, myalgias, neck pain, numbness, rash, sore throat or vomiting. The symptoms are aggravated by bending. He has tried nothing for the symptoms. The treatment provided no relief.     Patient's liver enzymes are doing better at this point.  Past Medical History:   Diagnosis Date    Depression     Diabetes mellitus, type 2     GERD (gastroesophageal reflux disease)     Hyperlipidemia     Hypertension      Social History     Socioeconomic History    Marital status:      Spouse name: Julieta    Number of children: 2    Years of education: Not on  file    Highest education level: Not on file   Occupational History    Not on file   Social Needs    Financial resource strain: Not on file    Food insecurity:     Worry: Not on file     Inability: Not on file    Transportation needs:     Medical: Not on file     Non-medical: Not on file   Tobacco Use    Smoking status: Never Smoker    Smokeless tobacco: Never Used   Substance and Sexual Activity    Alcohol use: Yes    Drug use: No    Sexual activity: Yes     Partners: Female   Lifestyle    Physical activity:     Days per week: Not on file     Minutes per session: Not on file    Stress: Not at all   Relationships    Social connections:     Talks on phone: Not on file     Gets together: Not on file     Attends [employer] service: Not on file     Active member of club or organization: Not on file     Attends meetings of clubs or organizations: Not on file     Relationship status: Not on file   Other Topics Concern    Not on file   Social History Narrative    Not on file     Past Surgical History:   Procedure Laterality Date    DENTAL SURGERY  04/05/2019 5/24/2019    HERNIA REPAIR      NASAL SEPTUM SURGERY       Family History   Problem Relation Age of Onset    Hypertension Mother     Cancer Mother         ??    Hypertension Father     Heart disease Father     Heart failure Father        Review of Systems   Constitutional: Negative for activity change, appetite change, chills, diaphoresis, fatigue, fever and unexpected weight change (No significant change in weight.).   HENT: Positive for dental problem. Negative for congestion, drooling, facial swelling, mouth sores, sneezing and sore throat.         Patient is undergoing dental procedures with implants.  He is in the final stages.   Eyes: Negative for pain, discharge and itching.   Respiratory: Negative for cough, chest tightness, shortness of breath, wheezing and stridor.    Cardiovascular: Negative for chest pain, palpitations and leg  swelling.        Underlying stable hypertension. Occasional ectopics.   Gastrointestinal: Negative for abdominal distention, abdominal pain, anal bleeding, diarrhea and vomiting.        Stable reflux symptoms. Liver enzyme ALT slightly elevated.   Endocrine: Negative for polydipsia.        Dyslipidemia-elevated blood sugar on metformin   Genitourinary: Negative for difficulty urinating, dysuria and frequency.        Itching in penis   Musculoskeletal: Positive for arthralgias and back pain. Negative for myalgias and neck pain.        Right elbow swelling consistent with olecranon bursitis.   Skin: Negative for rash.   Allergic/Immunologic: Negative for environmental allergies.   Neurological: Negative for dizziness, numbness and headaches.   Psychiatric/Behavioral: Negative for dysphoric mood.         Objective:      Blood pressure 134/86, pulse 77, height 6' (1.829 m), weight 102.1 kg (225 lb). Body mass index is 30.52 kg/m².  Physical Exam   Constitutional: He appears well-developed and well-nourished.   BMI of 30   HENT:   Head: Normocephalic and atraumatic.   Mouth/Throat: No oropharyngeal exudate.   Eyes: Conjunctivae and EOM are normal.   Neck: Normal range of motion. Neck supple. No JVD present. No tracheal deviation present. No thyromegaly present.   Cardiovascular: Normal rate, regular rhythm, S1 normal, S2 normal and normal heart sounds.  Occasional extrasystoles are present. PMI is not displaced.   Pulmonary/Chest: Effort normal and breath sounds normal. No respiratory distress. He has no wheezes. He has no rales.   Abdominal: Soft. Bowel sounds are normal. There is no tenderness.   Musculoskeletal:        Right elbow: He exhibits swelling and effusion.        Arms:  Neurological: He is alert.   Skin: Skin is warm and dry.   Psychiatric: He has a normal mood and affect.   Nursing note and vitals reviewed.        Assessment:       1. Benign essential hypertension    2. Olecranon bursitis of right elbow     3. Needs flu shot           Lab Visit on 09/18/2019   Component Date Value Ref Range Status    Total Protein 09/18/2019 7.5  6.0 - 8.4 g/dL Final    Albumin 09/18/2019 4.4  3.5 - 5.2 g/dL Final    Total Bilirubin 09/18/2019 0.7  0.1 - 1.0 mg/dL Final    Bilirubin, Direct 09/18/2019 0.1  0.1 - 0.3 mg/dL Final    AST 09/18/2019 28  10 - 40 U/L Final    ALT 09/18/2019 29  10 - 44 U/L Final    Alkaline Phosphatase 09/18/2019 77  55 - 135 U/L Final         Plan:           Benign essential hypertension    Olecranon bursitis of right elbow    Needs flu shot  -     Influenza - Quadrivalent (PF)      Patient's blood pressures were somewhat borderline but there okay at home.  He will continue to monitor his blood pressures and continue with his exercise efforts.    He does have some olecranon bursitis in the right elbow and I will give him 1 month of time to heal by itself.  He should avoid any exercises which involves significant bending in his elbows.  Keep his arms and elbows as much straight as possible even at night.  He can apply compression Ace wrap also.  Perhaps it to get absorbed by itself.    Normally I will see him in 3 months time for follow-up.  If his swelling does not dissipate and 1 month time then I will perform an aspiration procedure on the bursitis.  Any procedure is associated with a mild risk of infection and recurrence of swelling.      Current Outpatient Medications:     aspirin (ECOTRIN) 81 MG EC tablet, Take 1 tablet by mouth Daily., Disp: , Rfl:     atorvastatin (LIPITOR) 10 MG tablet, TAKE 1 TABLET DAILY, Disp: 90 tablet, Rfl: 4    ibuprofen (ADVIL,MOTRIN) 800 MG tablet, Take 1 tablet (800 mg total) by mouth 3 (three) times daily as needed for Pain (joint pain)., Disp: 90 tablet, Rfl: 1    ipratropium (ATROVENT) 0.03 % nasal spray, 2 sprays by Nasal route 2 (two) times daily., Disp: 90 mL, Rfl: 1    lisinopril (PRINIVIL,ZESTRIL) 40 MG tablet, Take 40 mg by mouth once daily., Disp: ,  Rfl:     loratadine (CLARITIN) 10 mg tablet, TAKE 1 TABLET DAILY, Disp: 90 tablet, Rfl: 4    metFORMIN (GLUCOPHAGE) 500 MG tablet, Take 1 tablet (500 mg total) by mouth once daily., Disp: 90 tablet, Rfl: 4    NIFEdipine (ADALAT CC) 90 MG TbSR, TAKE 1 TABLET DAILY, Disp: 90 tablet, Rfl: 2    pantoprazole (PROTONIX) 40 MG tablet, TAKE 1 TABLET DAILY (NEXIUM DISCONTINUED DUE TO NON FORMULARY), Disp: 90 tablet, Rfl: 1    sildenafil (VIAGRA) 100 MG tablet, Take 1 tablet (100 mg total) by mouth daily as needed., Disp: 24 tablet, Rfl: 3    SYSTANE ULTRA, PF, 0.4-0.3 % Dpet, , Disp: , Rfl:     tamsulosin (FLOMAX) 0.4 mg Cp24, Take 1 capsule (0.4 mg total) by mouth once daily., Disp: 90 capsule, Rfl: 3    triazolam (HALCION) 0.25 MG Tab, Take 0.25 mg by mouth once daily., Disp: , Rfl: 0    chlorhexidine (PERIDEX) 0.12 % solution, , Disp: , Rfl:     diazePAM (VALIUM) 10 MG Tab, Take 10 mg by mouth once daily., Disp: , Rfl: 0    HYDROcodone-acetaminophen (NORCO) 7.5-325 mg per tablet, , Disp: , Rfl:     hydrocortisone valerate (WEST-KIMANI) 0.2 % ointment, 1 Dose once daily., Disp: , Rfl:     nystatin (MYCOSTATIN) cream, Apply topically 2 (two) times daily. (Patient not taking: Reported on 9/25/2019), Disp: 30 g, Rfl: 0

## 2019-09-26 NOTE — PATIENT INSTRUCTIONS
Bursitis  You have bursitis. This is an inflammation of the bursa. These are small, fluid-filled sacs that surround the larger joints of the body. The bursa help the muscles and tendons move smoothly over the joints.  Bursitis often happens in the shoulder. But it can also affect the elbows, hips, pelvis, knees, toes, and heels. Bursitis can be caused by injury, overuse of the joint, or infection of the bursa. Symptoms include pain and tenderness over a joint. Symptoms get worse with movement.  Bursitis is treated with an anti-inflammatory medicine and by resting the joint. More severe cases require injection of medicine directly into the bursa.    Home care  · Rest the painful joint and protect it from movement. This will allow the inflammation to heal faster.  · Apply an ice pack over the injured area for no more than 15 to 20 minutes. Do this every 3 to 6 hours for the first 24 to 48 hours. Keep using ice packs 3 to 4 times a day until the pain and swelling improves.   · To make an ice pack, put ice cubes in a sealed plastic zip-lock bag. Wrap the bag in a clean, thin towel or cloth. Never put ice or an ice pack directly on the skin. As the ice melts, be careful to avoid getting any wrap or splint wet.  · You may take over-the-counter pain medicine to treat pain and inflammation, unless another medicine was prescribed. Anti-inflammatory pain medicines may be more effective. Talk with your provider beforeusing these medicines if you have chronic liver or kidney disease, or ever had a stomach ulcer or GI (gastrointestinal) bleeding.  · As your symptoms improve, slowly begin to move the joint. Do not overuse the joint. This may cause the symptoms to flare up again.  When to seek medical advice  Call your healthcare provider right away if any of these occur:  · Redness over the painful area  · Increasing pain or swelling at the joint  · Fever of 100.4°F (38°C) or above lasting for 24 to 48 hours  Date Last  Reviewed: 11/21/2015  © 5839-3099 [employer]. [address] [place] [address], [place] [address]. All rights reserved. This information is not intended as a substitute for professional medical care. Always follow your healthcare professional's instructions.

## 2019-09-27 ENCOUNTER — PATIENT MESSAGE (OUTPATIENT)
Dept: FAMILY MEDICINE | Facility: CLINIC | Age: 64
End: 2019-09-27

## 2019-09-29 DIAGNOSIS — M54.50 CHRONIC MIDLINE LOW BACK PAIN WITHOUT SCIATICA: ICD-10-CM

## 2019-09-29 DIAGNOSIS — N52.9 ERECTILE DYSFUNCTION, UNSPECIFIED ERECTILE DYSFUNCTION TYPE: ICD-10-CM

## 2019-09-29 DIAGNOSIS — J31.0 NON-ALLERGIC RHINITIS: ICD-10-CM

## 2019-09-29 DIAGNOSIS — G89.29 CHRONIC MIDLINE LOW BACK PAIN WITHOUT SCIATICA: ICD-10-CM

## 2019-09-29 RX ORDER — IPRATROPIUM BROMIDE 21 UG/1
SPRAY, METERED NASAL
Qty: 90 ML | Refills: 4 | Status: SHIPPED | OUTPATIENT
Start: 2019-09-29

## 2019-09-30 RX ORDER — IBUPROFEN 800 MG/1
800 TABLET ORAL 3 TIMES DAILY PRN
Qty: 90 TABLET | Refills: 1 | Status: SHIPPED | OUTPATIENT
Start: 2019-09-30 | End: 2020-12-03

## 2019-09-30 RX ORDER — SILDENAFIL 100 MG/1
100 TABLET, FILM COATED ORAL DAILY PRN
Qty: 24 TABLET | Refills: 3 | Status: SHIPPED | OUTPATIENT
Start: 2019-09-30 | End: 2020-03-03 | Stop reason: SDUPTHER

## 2019-10-23 ENCOUNTER — OFFICE VISIT (OUTPATIENT)
Dept: FAMILY MEDICINE | Facility: CLINIC | Age: 64
End: 2019-10-23
Payer: OTHER GOVERNMENT

## 2019-10-23 VITALS
SYSTOLIC BLOOD PRESSURE: 127 MMHG | WEIGHT: 224 LBS | TEMPERATURE: 97 F | BODY MASS INDEX: 30.34 KG/M2 | HEIGHT: 72 IN | HEART RATE: 79 BPM | DIASTOLIC BLOOD PRESSURE: 81 MMHG

## 2019-10-23 DIAGNOSIS — M70.21 OLECRANON BURSITIS OF RIGHT ELBOW: Primary | ICD-10-CM

## 2019-10-23 PROCEDURE — 99214 OFFICE O/P EST MOD 30 MIN: CPT | Performed by: INTERNAL MEDICINE

## 2019-10-23 PROCEDURE — 99212 OFFICE O/P EST SF 10 MIN: CPT | Mod: 25,S$PBB,, | Performed by: INTERNAL MEDICINE

## 2019-10-23 PROCEDURE — 20605 INTERMEDIATE JOINT ASPIRATION/INJECTION: R OLECRANON BURSA: ICD-10-PCS | Mod: S$PBB,RT,, | Performed by: INTERNAL MEDICINE

## 2019-10-23 PROCEDURE — 20605 DRAIN/INJ JOINT/BURSA W/O US: CPT | Mod: PBBFAC | Performed by: INTERNAL MEDICINE

## 2019-10-23 PROCEDURE — 99212 PR OFFICE/OUTPT VISIT, EST, LEVL II, 10-19 MIN: ICD-10-PCS | Mod: 25,S$PBB,, | Performed by: INTERNAL MEDICINE

## 2019-10-23 NOTE — PROGRESS NOTES
Subjective:       Patient ID: Cipriano Gunderson is a 64 y.o. male.    Chief Complaint: Joint Swelling (swelling )    Mr. Cipriano Huggins is a 64-year-old gentleman who comes for follow-up.  The right-sided bursitis does not seem to be abating at this point.  The swelling persist.  He would like this to be addressed and drained.  He has been advised that bursitis sometimes is recurrent.  It depends upon the activities and also the anatomy of the joint itself.  It is possible that it might come back again if he continues with the same activities.      Past Medical History:   Diagnosis Date    Depression     Diabetes mellitus, type 2     GERD (gastroesophageal reflux disease)     Hyperlipidemia     Hypertension      Social History     Socioeconomic History    Marital status:      Spouse name: Julieta    Number of children: 2    Years of education: Not on file    Highest education level: Not on file   Occupational History    Occupation: [employer]   Social Needs    Financial resource strain: Not on file    Food insecurity:     Worry: Not on file     Inability: Not on file    Transportation needs:     Medical: Not on file     Non-medical: Not on file   Tobacco Use    Smoking status: Never Smoker    Smokeless tobacco: Never Used   Substance and Sexual Activity    Alcohol use: Yes     Alcohol/week: 1.0 - 2.0 standard drinks     Types: 1 - 2 Glasses of wine per week     Comment: weekends    Drug use: No    Sexual activity: Yes     Partners: Female   Lifestyle    Physical activity:     Days per week: Not on file     Minutes per session: Not on file    Stress: Not at all   Relationships    Social connections:     Talks on phone: Not on file     Gets together: Not on file     Attends [employer] service: Not on file     Active member of club or organization: Not on file     Attends meetings of clubs or organizations: Not on file     Relationship status: Not on file   Other Topics Concern    Not on file    Social History Narrative    Not on file     Past Surgical History:   Procedure Laterality Date    DENTAL SURGERY  04/05/2019 5/24/2019    HERNIA REPAIR      NASAL SEPTUM SURGERY       Family History   Problem Relation Age of Onset    Hypertension Mother     Cancer Mother         ??    Hypertension Father     Heart disease Father     Heart failure Father        Review of Systems   Constitutional: Negative.    HENT: Negative.    Respiratory: Negative.    Cardiovascular:        Hypertension and dyslipidemia stable.   Musculoskeletal: Negative for back pain and gait problem.        Bursitis of right elbow joint.   Neurological: Negative.          Objective:      Blood pressure 127/81, pulse 79, temperature 97.1 °F (36.2 °C), height 6' (1.829 m), weight 101.6 kg (224 lb). Body mass index is 30.38 kg/m².  Physical Exam   Constitutional: He appears well-developed and well-nourished. No distress.   Cardiovascular: Normal rate and regular rhythm.   Pulmonary/Chest: Effort normal and breath sounds normal.   Musculoskeletal: He exhibits no tenderness.   Olecranon bursa of right elbow joint.         Assessment:       1. Olecranon bursitis of right elbow           Lab Visit on 09/18/2019   Component Date Value Ref Range Status    Total Protein 09/18/2019 7.5  6.0 - 8.4 g/dL Final    Albumin 09/18/2019 4.4  3.5 - 5.2 g/dL Final    Total Bilirubin 09/18/2019 0.7  0.1 - 1.0 mg/dL Final    Bilirubin, Direct 09/18/2019 0.1  0.1 - 0.3 mg/dL Final    AST 09/18/2019 28  10 - 40 U/L Final    ALT 09/18/2019 29  10 - 44 U/L Final    Alkaline Phosphatase 09/18/2019 77  55 - 135 U/L Final         Plan:           Olecranon bursitis of right elbow      Under stress all methods approximately 10 cc of sanguinous fluid was drained from the right elbow.  This was addressed subsequently.  Patient has been advised not to do significant flexion and extension activities in the right elbow for a few days.  Avoid significant  weight-bearing activities on the right elbow for the next 10 days.  Likely of recurrence of olecranon bursitis is great.  May require further drainage and finally referred to orthopedic specialist.  Notify us or earlier if there is bleeding from the drainage site.    Keep regular follow-up.      Current Outpatient Medications:     aspirin (ECOTRIN) 81 MG EC tablet, Take 1 tablet by mouth Daily., Disp: , Rfl:     atorvastatin (LIPITOR) 10 MG tablet, TAKE 1 TABLET DAILY, Disp: 90 tablet, Rfl: 4    chlorhexidine (PERIDEX) 0.12 % solution, , Disp: , Rfl:     diazePAM (VALIUM) 10 MG Tab, Take 10 mg by mouth once daily., Disp: , Rfl: 0    HYDROcodone-acetaminophen (NORCO) 7.5-325 mg per tablet, , Disp: , Rfl:     hydrocortisone valerate (WEST-KIMANI) 0.2 % ointment, 1 Dose once daily., Disp: , Rfl:     ibuprofen (ADVIL,MOTRIN) 800 MG tablet, Take 1 tablet (800 mg total) by mouth 3 (three) times daily as needed for Pain (joint pain)., Disp: 90 tablet, Rfl: 1    ipratropium (ATROVENT) 0.03 % nasal spray, USE 2 SPRAYS NASALLY TWICE A DAY, Disp: 90 mL, Rfl: 4    lisinopril (PRINIVIL,ZESTRIL) 40 MG tablet, Take 40 mg by mouth once daily., Disp: , Rfl:     loratadine (CLARITIN) 10 mg tablet, TAKE 1 TABLET DAILY, Disp: 90 tablet, Rfl: 4    metFORMIN (GLUCOPHAGE) 500 MG tablet, Take 1 tablet (500 mg total) by mouth once daily., Disp: 90 tablet, Rfl: 4    NIFEdipine (ADALAT CC) 90 MG TbSR, TAKE 1 TABLET DAILY, Disp: 90 tablet, Rfl: 2    nystatin (MYCOSTATIN) cream, Apply topically 2 (two) times daily., Disp: 30 g, Rfl: 0    pantoprazole (PROTONIX) 40 MG tablet, TAKE 1 TABLET DAILY (NEXIUM DISCONTINUED DUE TO NON FORMULARY), Disp: 90 tablet, Rfl: 1    sildenafil (VIAGRA) 100 MG tablet, Take 1 tablet (100 mg total) by mouth daily as needed., Disp: 24 tablet, Rfl: 3    SYSTANE ULTRA, PF, 0.4-0.3 % Dpet, , Disp: , Rfl:     tamsulosin (FLOMAX) 0.4 mg Cp24, Take 1 capsule (0.4 mg total) by mouth once daily., Disp: 90  capsule, Rfl: 3    triazolam (HALCION) 0.25 MG Tab, Take 0.25 mg by mouth once daily., Disp: , Rfl: 0

## 2019-10-23 NOTE — PROCEDURES
Intermediate Joint Aspiration/Injection: R olecranon bursa  Date/Time: 10/23/2019 10:40 AM  Performed by: Chuckie Alberto MD  Authorized by: Chuckie Alberto MD     Consent Done?:  Yes (Written)  Indications:  Joint swelling  Site marked: The procedure site was marked      Location:  Elbow  Site:  R olecranon bursa  Ultrasonic Guidance for needle placement: No  Needle size:  18 G  Approach:  Posterior  Aspirate amount (ml):  10  Aspirate:  Blood-tinged     Additional Comments: Slight residual swelling. This was dressed up completely.  Patient has been advised not to do any significant activities in the right elbow.  He can have his meals and take a shower.  No lawn mowing today.  No repetitive bending activities with the right elbow for the next 10 days.  His elbow should be more or less straight

## 2019-10-23 NOTE — PATIENT INSTRUCTIONS
Bursitis of the Elbow (Olecranon)  Your elbow joint contains a small fluid-filled sac called a bursa. The bursa helps the muscles and tendons move smoothly over the bone. It also cushions and protects your elbow. Bursitis is when the bursa is inflamed or swollen. This is most often due to overuse of or injury to the elbow. Symptoms include swelling and pain. If the elbow is red and feels warm to the touch, the bursa itself may be infected.  In most cases, elbow bursitis resolves with medicine and self-care at home. It may take several weeks for the bursa to heal and the swelling to go away. In some cases, your healthcare provider may drain excess fluid from the bursa. Or, he or she may inject medicine directly into the bursa to help relieve symptoms. In severe cases, you may need surgery to remove the bursa may. If there is concern that the bursa is infected, your healthcare provider may prescribe antibiotics to treat the infection.    Home care  Your healthcare provider may prescribe medicine to help relieve pain and swelling. This may be an over-the-counter pain reliever or prescription pain medicine. Take all medicines as directed. To help treat or prevent infection, your provider may prescribe antibiotics. If these are prescribed, take them as directed until they are gone.  The following are general care guidelines:  · Apply an ice pack or bag of frozen peas wrapped in a thin towel to your elbow for 15 to 20 minutes at a time. Do this 3 to 4 times a day until pain and swelling improve.  · Keep your elbow raised above the level of your heart whenever possible. This helps reduce swelling. When sitting or lying down, place your arm on a pillow that rests on your chest or on a pillow at your side.  · Use an elastic wrap around the elbow joint to compress the area while it is healing. Make the wrap snug but not tight to the point of causing pain.  · Rest your elbow to give it time to heal. You may need to wear an  elbow pad to help protect and limit the movement of your elbow. During and after healing, avoid leaning on your elbows.  Follow-up care  Follow up with your healthcare provider, or as advised. If you have been referred to a specialist, make that appointment promptly.  When to seek medical advice  Call your healthcare provider right away if any of these occur:  · Fever of 100.4°F (38°C) or higher, or as advised  · Chills  · Increased pain, swelling, warmth, redness, or drainage from the joint  · Trouble moving the elbow joint  · Numbness or tingling in the hand  · Severe pain or swelling in forearm or hand  · Loss of pink color and slow return of color after squeezing fingertip or hand  Date Last Reviewed: 6/1/2016  © 3390-1296 [employer]. [address] [place] [address], [place] [address]. All rights reserved. This information is not intended as a substitute for professional medical care. Always follow your healthcare professional's instructions.

## 2019-12-02 RX ORDER — NIFEDIPINE 90 MG/1
TABLET, FILM COATED, EXTENDED RELEASE ORAL
Qty: 90 TABLET | Refills: 4 | Status: SHIPPED | OUTPATIENT
Start: 2019-12-02 | End: 2021-06-02

## 2019-12-18 ENCOUNTER — OFFICE VISIT (OUTPATIENT)
Dept: FAMILY MEDICINE | Facility: CLINIC | Age: 64
End: 2019-12-18
Payer: OTHER GOVERNMENT

## 2019-12-18 VITALS
HEIGHT: 72 IN | SYSTOLIC BLOOD PRESSURE: 121 MMHG | WEIGHT: 225 LBS | HEART RATE: 86 BPM | DIASTOLIC BLOOD PRESSURE: 78 MMHG | BODY MASS INDEX: 30.48 KG/M2

## 2019-12-18 DIAGNOSIS — M70.21 OLECRANON BURSITIS OF RIGHT ELBOW: ICD-10-CM

## 2019-12-18 DIAGNOSIS — R05.9 COUGH: Primary | ICD-10-CM

## 2019-12-18 PROCEDURE — 99213 OFFICE O/P EST LOW 20 MIN: CPT | Performed by: INTERNAL MEDICINE

## 2019-12-18 PROCEDURE — 99213 OFFICE O/P EST LOW 20 MIN: CPT | Mod: S$PBB,,, | Performed by: INTERNAL MEDICINE

## 2019-12-18 PROCEDURE — 99213 PR OFFICE/OUTPT VISIT, EST, LEVL III, 20-29 MIN: ICD-10-PCS | Mod: S$PBB,,, | Performed by: INTERNAL MEDICINE

## 2019-12-18 NOTE — PROGRESS NOTES
Subjective:       Patient ID: Cipriano Gunderson is a 64 y.o. male.    Chief Complaint: Elbow Pain; Cough; and Chest Congestion    Mr. Cipriano ashraf is on is a 64-year-old gentleman who comes for follow-up.  Previous visit he had right elbow swelling consistent with olecranon bursitis.  This was drained.  After that he has stopped exercising.  He feels much better but not completely better.  On examination there is no significant swelling in the elbow region at this point.    He also has a chest congestion and cough which is minimally productive.  He is nonsmoker.  Nobody around him is sick.  He does not have any fever.  Please note that in the community there is a outbreak of flu and also upper viral symptoms.     Cough   This is a new problem. The current episode started in the past 7 days. The problem has been waxing and waning. The problem occurs every few hours. The cough is non-productive. Associated symptoms include postnasal drip. Pertinent negatives include no chest pain ( chest pressure), chills, eye redness, fever, headaches, heartburn, hemoptysis or shortness of breath. He has tried nothing for the symptoms. The treatment provided no relief. There is no history of asthma, bronchiectasis, COPD, emphysema, environmental allergies or pneumonia.       Past Medical History:   Diagnosis Date    Depression     Diabetes mellitus, type 2     GERD (gastroesophageal reflux disease)     Hyperlipidemia     Hypertension      Social History     Socioeconomic History    Marital status:      Spouse name: Julieta    Number of children: 2    Years of education: Not on file    Highest education level: Not on file   Occupational History    Occupation: [employer]   Social Needs    Financial resource strain: Not on file    Food insecurity:     Worry: Not on file     Inability: Not on file    Transportation needs:     Medical: Not on file     Non-medical: Not on file   Tobacco Use    Smoking status: Never Smoker     Smokeless tobacco: Never Used   Substance and Sexual Activity    Alcohol use: Yes     Alcohol/week: 1.0 - 2.0 standard drinks     Types: 1 - 2 Glasses of wine per week     Comment: weekends    Drug use: No    Sexual activity: Yes     Partners: Female   Lifestyle    Physical activity:     Days per week: Not on file     Minutes per session: Not on file    Stress: Not at all   Relationships    Social connections:     Talks on phone: Not on file     Gets together: Not on file     Attends [employer] service: Not on file     Active member of club or organization: Not on file     Attends meetings of clubs or organizations: Not on file     Relationship status: Not on file   Other Topics Concern    Not on file   Social History Narrative    Not on file     Past Surgical History:   Procedure Laterality Date    DENTAL SURGERY  04/05/2019 5/24/2019    HERNIA REPAIR      NASAL SEPTUM SURGERY       Family History   Problem Relation Age of Onset    Hypertension Mother     Cancer Mother         ??    Hypertension Father     Heart disease Father     Heart failure Father        Review of Systems   Constitutional: Negative for chills, fatigue and fever.   HENT: Positive for congestion and postnasal drip. Negative for nosebleeds and voice change.    Eyes: Negative for photophobia, redness, itching and visual disturbance.   Respiratory: Positive for cough. Negative for hemoptysis, choking and shortness of breath.    Cardiovascular: Negative for chest pain ( chest pressure), palpitations and leg swelling.   Gastrointestinal: Negative for abdominal distention, abdominal pain and heartburn.   Musculoskeletal:        Right elbow history of olecranon bursitis better now.   Allergic/Immunologic: Negative for environmental allergies, food allergies and immunocompromised state.   Neurological: Negative for dizziness, speech difficulty and headaches.         Objective:      Blood pressure 121/78, pulse 86, height 6' (1.829  m), weight 102.1 kg (225 lb). Body mass index is 30.52 kg/m².  Physical Exam   Constitutional: He appears well-developed and well-nourished. No distress.   Eyes: No scleral icterus.   Neck: Neck supple. No thyromegaly present.   Cardiovascular: Normal rate and regular rhythm.   Pulmonary/Chest: Effort normal and breath sounds normal.   Minimal harsh conducted sounds on coughing.  No significant expectoration.  No wheezing or rhonchi.   Abdominal: Soft. Bowel sounds are normal.   Musculoskeletal:   Examination of the right elbow does not show any significant appreciable swelling or fluid collection.   Neurological: He is alert.   Skin: Skin is warm and dry. No rash noted.         Assessment:       1. Cough    2. Olecranon bursitis of right elbow           No visits with results within 3 Month(s) from this visit.   Latest known visit with results is:   Lab Visit on 09/18/2019   Component Date Value Ref Range Status    Total Protein 09/18/2019 7.5  6.0 - 8.4 g/dL Final    Albumin 09/18/2019 4.4  3.5 - 5.2 g/dL Final    Total Bilirubin 09/18/2019 0.7  0.1 - 1.0 mg/dL Final    Bilirubin, Direct 09/18/2019 0.1  0.1 - 0.3 mg/dL Final    AST 09/18/2019 28  10 - 40 U/L Final    ALT 09/18/2019 29  10 - 44 U/L Final    Alkaline Phosphatase 09/18/2019 77  55 - 135 U/L Final         Plan:           Cough  Comments:  Likely viral URI in a nonsmoker and with no exposure.  Treat conservatively with Mucinex DM and salt water gargles/steam inhalation.  No antibiotics now.    Olecranon bursitis of right elbow  Comments:  Right elbow/olecranon bursitis was drained last time.  Thankfully no recurrence peak patient wants clearance for starting exercises.  No friction.      I have advised the patient to do some salt water gargles and steam inhalation.  Try some Mucinex DM over-the-counter and see how he does in the next few days.  Watch out for any fever or yellow mucus in which case we might have to call him antibiotics.  At this  point no.    His right elbow appears to be much better than before.  The olecranon bursitis seems to have resolved though he has some discomfort in that area.  I have advised him to carefully start his exercise program and try to see that he does not rub his elbow on any hard surface to create any friction.    Let us see in the next couple of months as to how he does    He will keep his regularly follow-up appointment in January.      Current Outpatient Medications:     aspirin (ECOTRIN) 81 MG EC tablet, Take 1 tablet by mouth Daily., Disp: , Rfl:     atorvastatin (LIPITOR) 10 MG tablet, TAKE 1 TABLET DAILY, Disp: 90 tablet, Rfl: 4    chlorhexidine (PERIDEX) 0.12 % solution, , Disp: , Rfl:     diazePAM (VALIUM) 10 MG Tab, Take 10 mg by mouth once daily., Disp: , Rfl: 0    HYDROcodone-acetaminophen (NORCO) 7.5-325 mg per tablet, , Disp: , Rfl:     hydrocortisone valerate (WEST-KIMANI) 0.2 % ointment, 1 Dose once daily., Disp: , Rfl:     ibuprofen (ADVIL,MOTRIN) 800 MG tablet, Take 1 tablet (800 mg total) by mouth 3 (three) times daily as needed for Pain (joint pain)., Disp: 90 tablet, Rfl: 1    ipratropium (ATROVENT) 0.03 % nasal spray, USE 2 SPRAYS NASALLY TWICE A DAY, Disp: 90 mL, Rfl: 4    lisinopril (PRINIVIL,ZESTRIL) 40 MG tablet, Take 40 mg by mouth once daily., Disp: , Rfl:     loratadine (CLARITIN) 10 mg tablet, TAKE 1 TABLET DAILY, Disp: 90 tablet, Rfl: 4    metFORMIN (GLUCOPHAGE) 500 MG tablet, Take 1 tablet (500 mg total) by mouth once daily., Disp: 90 tablet, Rfl: 4    NIFEdipine (ADALAT CC) 90 MG TbSR, TAKE 1 TABLET DAILY, Disp: 90 tablet, Rfl: 4    nystatin (MYCOSTATIN) cream, Apply topically 2 (two) times daily., Disp: 30 g, Rfl: 0    pantoprazole (PROTONIX) 40 MG tablet, TAKE 1 TABLET DAILY (NEXIUM DISCONTINUED DUE TO NON FORMULARY), Disp: 90 tablet, Rfl: 1    sildenafil (VIAGRA) 100 MG tablet, Take 1 tablet (100 mg total) by mouth daily as needed., Disp: 24 tablet, Rfl: 3    SYSTANE  ULTRA, PF, 0.4-0.3 % Dpet, , Disp: , Rfl:     tamsulosin (FLOMAX) 0.4 mg Cp24, Take 1 capsule (0.4 mg total) by mouth once daily., Disp: 90 capsule, Rfl: 3    triazolam (HALCION) 0.25 MG Tab, Take 0.25 mg by mouth once daily., Disp: , Rfl: 0

## 2019-12-18 NOTE — PATIENT INSTRUCTIONS
Viral Upper Respiratory Illness (Adult)  You have a viral upper respiratory illness (URI), which is another term for the common cold. This illness is contagious during the first few days. It is spread through the air by coughing and sneezing. It may also be spread by direct contact (touching the sick person and then touching your own eyes, nose, or mouth). Frequent handwashing will decrease risk of spread. Most viral illnesses go away within 7 to 10 days with rest and simple home remedies. Sometimes the illness may last for several weeks. Antibiotics will not kill a virus, and they are generally not prescribed for this condition.    Home care  · If symptoms are severe, rest at home for the first 2 to 3 days. When you resume activity, don't let yourself get too tired.  · Avoid being exposed to cigarette smoke (yours or others).  · You may use acetaminophen or ibuprofen to control pain and fever, unless another medicine was prescribed. (Note: If you have chronic liver or kidney disease, have ever had a stomach ulcer or gastrointestinal bleeding, or are taking blood-thinning medicines, talk with your healthcare provider before using these medicines.) Aspirin should never be given to anyone under 18 years of age who is ill with a viral infection or fever. It may cause severe liver or brain damage.  · Your appetite may be poor, so a light diet is fine. Avoid dehydration by drinking 6 to 8 glasses of fluids per day (water, soft drinks, juices, tea, or soup). Extra fluids will help loosen secretions in the nose and lungs.  · Over-the-counter cold medicines will not shorten the length of time youre sick, but they may be helpful for the following symptoms: cough, sore throat, and nasal and sinus congestion. (Note: Do not use decongestants if you have high blood pressure.)  Follow-up care  Follow up with your healthcare provider, or as advised.  When to seek medical advice  Call your healthcare provider right away if any  of these occur:  · Cough with lots of colored sputum (mucus)  · Severe headache; face, neck, or ear pain  · Difficulty swallowing due to throat pain  · Fever of 100.4°F (38°C)  Call 911, or get immediate medical care  Call emergency services right away if any of these occur:  · Chest pain, shortness of breath, wheezing, or difficulty breathing  · Coughing up blood  · Inability to swallow due to throat pain  Date Last Reviewed: 9/13/2015  © 1094-9647 [employer]. [address] [place] [address]. All rights reserved. This information is not intended as a substitute for professional medical care. Always follow your healthcare professional's instructions.

## 2020-01-09 ENCOUNTER — PATIENT MESSAGE (OUTPATIENT)
Dept: FAMILY MEDICINE | Facility: CLINIC | Age: 65
End: 2020-01-09

## 2020-01-09 DIAGNOSIS — R21 RASH: ICD-10-CM

## 2020-01-09 DIAGNOSIS — L98.9 SKIN LESION: Primary | ICD-10-CM

## 2020-01-10 ENCOUNTER — PATIENT MESSAGE (OUTPATIENT)
Dept: FAMILY MEDICINE | Facility: CLINIC | Age: 65
End: 2020-01-10

## 2020-01-13 ENCOUNTER — TELEPHONE (OUTPATIENT)
Dept: FAMILY MEDICINE | Facility: CLINIC | Age: 65
End: 2020-01-13

## 2020-01-13 NOTE — TELEPHONE ENCOUNTER
Please re-process the referral for Dr. Weil. He changed his PCP to Dr. Alberto. Pt. is seeing Dr. Weil today.

## 2020-01-15 ENCOUNTER — PATIENT MESSAGE (OUTPATIENT)
Dept: FAMILY MEDICINE | Facility: CLINIC | Age: 65
End: 2020-01-15

## 2020-01-20 ENCOUNTER — PATIENT MESSAGE (OUTPATIENT)
Dept: FAMILY MEDICINE | Facility: CLINIC | Age: 65
End: 2020-01-20

## 2020-01-27 ENCOUNTER — OFFICE VISIT (OUTPATIENT)
Dept: FAMILY MEDICINE | Facility: CLINIC | Age: 65
End: 2020-01-27
Payer: OTHER GOVERNMENT

## 2020-01-27 VITALS
HEART RATE: 81 BPM | WEIGHT: 224 LBS | HEIGHT: 72 IN | DIASTOLIC BLOOD PRESSURE: 82 MMHG | SYSTOLIC BLOOD PRESSURE: 131 MMHG | BODY MASS INDEX: 30.34 KG/M2

## 2020-01-27 DIAGNOSIS — I10 BENIGN ESSENTIAL HYPERTENSION: Primary | ICD-10-CM

## 2020-01-27 DIAGNOSIS — E78.00 HYPERCHOLESTEROLEMIA: ICD-10-CM

## 2020-01-27 DIAGNOSIS — R73.01 IMPAIRED FASTING GLUCOSE: ICD-10-CM

## 2020-01-27 PROCEDURE — 99213 PR OFFICE/OUTPT VISIT, EST, LEVL III, 20-29 MIN: ICD-10-PCS | Mod: S$PBB,,, | Performed by: INTERNAL MEDICINE

## 2020-01-27 PROCEDURE — 99213 OFFICE O/P EST LOW 20 MIN: CPT | Performed by: INTERNAL MEDICINE

## 2020-01-27 PROCEDURE — 99213 OFFICE O/P EST LOW 20 MIN: CPT | Mod: S$PBB,,, | Performed by: INTERNAL MEDICINE

## 2020-01-27 RX ORDER — CETIRIZINE HYDROCHLORIDE 10 MG/1
10 TABLET ORAL DAILY
COMMUNITY

## 2020-01-27 NOTE — PATIENT INSTRUCTIONS
Exercise for a Healthier Heart  You may wonder how you can improve the health of your heart. If youre thinking about exercise, youre on the right track. You dont need to become an athlete, but you do need a certain amount of brisk exercise to help strengthen your heart. If you have been diagnosed with a heart condition, your doctor may recommend exercise to help stabilize your condition. To help make exercise a habit, choose safe, fun activities.     Exercise with a friend. When activity is fun, you're more likely to stick with it.     Be sure to check with your healthcare provider before starting an exercise program.   Why exercise?  Exercising regularly offers many healthy rewards. It can help you do all of the following:  · Improve your blood cholesterol level to help prevent further heart trouble  · Lower your blood pressure to help prevent a stroke or heart attack  · Control diabetes, or reduce your risk of getting this disease  · Improve your heart and lung function  · Reach and maintain a healthy weight  · Make your muscles stronger and more limber so you can stay active  · Prevent falls and fractures by slowing the loss of bone mass (osteoporosis)  · Manage stress better  · Reduce your blood pressure  · Improve your sense of self and your body image  Exercise tips  Ease into your routine. Set small goals. Then build on them.  Exercise on most days. Aim for a total of 150 or more minutes of moderate to  vigorous intensity activity each week. Consider 40 minutes, 3 to 4 times a week. For best results, activity should last for 40 minutes on average. It is OK to work up to the 40 minute period over time. Examples of moderate-intensity activity is walking 1 mile in 15 minutes or 30 to 45 minutes of yard work.  Step up your daily activity level. Along with your exercise program, try being more active throughout the day. Walk instead of drive. Do more household tasks or yard work.  Choose one or more  activities you enjoy. Walking is one of the easiest things you can do. You can also try swimming, riding a bike, dancing, or taking an exercise class.  Stop exercising and call your doctor if you:  · Have chest pain or feel dizzy or lightheaded  · Feel burning, tightness, pressure, or heaviness in your chest, neck, shoulders, back, or arms  · Have unusual shortness of breath  · Have increased joint or muscle pain  · Have palpitations or an irregular heartbeat   Date Last Reviewed: 5/1/2016 © 2000-2017 [employer]. [address] [place] [id number]. All rights reserved. This information is not intended as a substitute for professional medical care. Always follow your healthcare professional's instructions.        Aerobic Exercise for a Healthy Heart  Exercise is a lot more than an energy booster and a stress reliever. It also strengthens your heart muscle, lowers your blood pressure and cholesterol, and burns calories. It can also improve your resting muscle tone, and your mood.     Remember, some activity is better than none.    Choose an aerobic activity  Choose an activity that makes your heart and lungs work harder than they do when you rest or walk normally. This aerobic exercise can improve the way your heart and other muscles use oxygen. Make it fun by exercising with a friend and choosing an activity you enjoy. Here are some ideas:  · Walking  · Swimming  · Bicycling  · Stair climbing  · Dancing  · Jogging  · Gardening  Exercise regularly  If you havent been exercising regularly,  get your doctors OK first. Then start slowly.  Here are some tips:  · Begin exercising 3 times a week for 5 to 10 minutes at a time.  · When you feel comfortable, add a few minutes each session.  · Slowly build up to exercising 3 to 4 times each week. Each session should last for 40 minutes, on average, and involve moderate- to vigorous-intensity physical activity.  · If you have been given nitroglycerin, be  sure to carry it when you exercise.  · If you get chest pain (angina) when youre exercising, stop what youre doing, take your nitroglycerin, and call your doctor.  Date Last Reviewed: 6/2/2016 © 2000-2017 [employer]. [address] [place] [address], [place] [address]. All rights reserved. This information is not intended as a substitute for professional medical care. Always follow your healthcare professional's instructions.

## 2020-01-27 NOTE — PROGRESS NOTES
Subjective:       Patient ID: Cipriano Gunderson is a 64 y.o. male.    Chief Complaint: Hypertension; Hyperlipidemia; and Hyperglycemia    Mr. Cipriano Gunderson is a pleasant 64-year-old gentleman who comes for follow-up.  Underlying medical issues of hypertension, dyslipidemia, erectile dysfunction and mildly elevated blood sugars have been noted.  He is compliant to his medications and dietary efforts also.    He has started a spanning cycle class at local [employer].  Initially he was reluctant Andrew but subsequently seeing his comrades exercising more than him, he decided to continue and he is doing well.    He is tolerating the exercise and spin cycle very good.  His blood pressures at home a doing good.    He will see Dr Weil tomorrow for scalp    Hypertension   This is a chronic problem. The problem is controlled. Pertinent negatives include no chest pain, headaches, neck pain, palpitations or shortness of breath. Risk factors for coronary artery disease include male gender and dyslipidemia. Past treatments include ACE inhibitors. The current treatment provides moderate improvement.   Hyperlipidemia   This is a chronic problem. The current episode started more than 1 year ago. The problem is controlled. He has no history of obesity. Pertinent negatives include no chest pain, myalgias or shortness of breath. Current antihyperlipidemic treatment includes statins. The current treatment provides moderate improvement of lipids. Risk factors for coronary artery disease include hypertension, male sex and dyslipidemia.       Past Medical History:   Diagnosis Date    Depression     Diabetes mellitus, type 2     GERD (gastroesophageal reflux disease)     Hyperlipidemia     Hypertension      Social History     Socioeconomic History    Marital status:      Spouse name: Julieta    Number of children: 2    Years of education: Not on file    Highest education level: Not on file   Occupational History     Occupation: [employer]   Social Needs    Financial resource strain: Not on file    Food insecurity:     Worry: Not on file     Inability: Not on file    Transportation needs:     Medical: Not on file     Non-medical: Not on file   Tobacco Use    Smoking status: Never Smoker    Smokeless tobacco: Never Used   Substance and Sexual Activity    Alcohol use: Yes     Alcohol/week: 1.0 - 2.0 standard drinks     Types: 1 - 2 Glasses of wine per week     Comment: weekends    Drug use: No    Sexual activity: Yes     Partners: Female   Lifestyle    Physical activity:     Days per week: Not on file     Minutes per session: Not on file    Stress: Not at all   Relationships    Social connections:     Talks on phone: Not on file     Gets together: Not on file     Attends [employer] service: Not on file     Active member of club or organization: Not on file     Attends meetings of clubs or organizations: Not on file     Relationship status: Not on file   Other Topics Concern    Not on file   Social History Narrative    Not on file     Past Surgical History:   Procedure Laterality Date    DENTAL SURGERY  04/05/2019 5/24/2019    HERNIA REPAIR      NASAL SEPTUM SURGERY       Family History   Problem Relation Age of Onset    Hypertension Mother     Cancer Mother         ??    Hypertension Father     Heart disease Father     Heart failure Father        Review of Systems   Constitutional: Negative for activity change, appetite change, chills, diaphoresis, fatigue, fever and unexpected weight change (No significant change in weight.).   HENT: Positive for dental problem. Negative for congestion, drooling, facial swelling, mouth sores, sneezing and sore throat.         Patient is undergoing dental procedures with implants.  He is in the final stages.   Eyes: Negative for pain, discharge and itching.   Respiratory: Negative for cough, chest tightness, shortness of breath, wheezing and stridor.    Cardiovascular:  Negative for chest pain, palpitations and leg swelling.        Underlying stable hypertension. Occasional ectopics.   Gastrointestinal: Negative for abdominal distention, abdominal pain, anal bleeding, diarrhea and vomiting.        Stable reflux symptoms. Liver enzyme ALT slightly elevated.   Endocrine: Negative for polydipsia.        Dyslipidemia-elevated blood sugar on metformin   Genitourinary: Negative for difficulty urinating, dysuria and frequency.        Itching in penis   Musculoskeletal: Positive for arthralgias and back pain. Negative for myalgias and neck pain.        Right elbow swelling consistent with olecranon bursitis.   Skin: Negative for rash.   Allergic/Immunologic: Negative for environmental allergies.   Neurological: Negative for dizziness, numbness and headaches.   Psychiatric/Behavioral: Negative for dysphoric mood.         Objective:      Blood pressure 131/82, pulse 81, height 6' (1.829 m), weight 101.6 kg (224 lb). Body mass index is 30.38 kg/m².  Physical Exam   Constitutional: He appears well-developed and well-nourished.   BMI of 30   HENT:   Head: Normocephalic and atraumatic.   Mouth/Throat: No oropharyngeal exudate.   Eyes: Conjunctivae and EOM are normal.   Neck: Normal range of motion. Neck supple. No JVD present. No tracheal deviation present. No thyromegaly present.   Cardiovascular: Normal rate, regular rhythm, S1 normal, S2 normal and normal heart sounds.  Occasional extrasystoles are present. PMI is not displaced.   Pulmonary/Chest: Effort normal and breath sounds normal. No respiratory distress. He has no wheezes. He has no rales.   Abdominal: Soft. Bowel sounds are normal. There is no tenderness.   Musculoskeletal:        Right elbow: He exhibits swelling and effusion.        Arms:  Neurological: He is alert.   Skin: Skin is warm and dry.   Psychiatric: He has a normal mood and affect.   Nursing note and vitals reviewed.        Assessment:       1. Benign essential  hypertension    2. Hypercholesterolemia    3. Impaired fasting glucose           No visits with results within 3 Month(s) from this visit.   Latest known visit with results is:   Lab Visit on 09/18/2019   Component Date Value Ref Range Status    Total Protein 09/18/2019 7.5  6.0 - 8.4 g/dL Final    Albumin 09/18/2019 4.4  3.5 - 5.2 g/dL Final    Total Bilirubin 09/18/2019 0.7  0.1 - 1.0 mg/dL Final    Bilirubin, Direct 09/18/2019 0.1  0.1 - 0.3 mg/dL Final    AST 09/18/2019 28  10 - 40 U/L Final    ALT 09/18/2019 29  10 - 44 U/L Final    Alkaline Phosphatase 09/18/2019 77  55 - 135 U/L Final         Plan:           Benign essential hypertension  -     Basic metabolic panel; Future; Expected date: 05/18/2020  -     Microalbumin/creatinine urine ratio; Future; Expected date: 05/18/2020    Hypercholesterolemia  -     Lipid panel; Future; Expected date: 05/18/2020    Impaired fasting glucose  -     Hemoglobin A1c; Future; Expected date: 05/18/2020      Patient has been advised to watch diet and exercise. Avoid fried and fatty food. Compliance to medications and follow up urged.    Advised Mr. Gunderson about age and season appropriate immunizations/ cancer screenings.  Also seasonal influenza vaccine, update on tetanus diphtheria vaccination every 10 years.    Pt will have a dermatology appt tomorrow with Dr weil .  Forward me any results.        Current Outpatient Medications:     aspirin (ECOTRIN) 81 MG EC tablet, Take 1 tablet by mouth Daily., Disp: , Rfl:     atorvastatin (LIPITOR) 10 MG tablet, TAKE 1 TABLET DAILY, Disp: 90 tablet, Rfl: 4    cetirizine (ZYRTEC) 10 MG tablet, Take 10 mg by mouth once daily., Disp: , Rfl:     hydrocortisone valerate (WEST-KIMANI) 0.2 % ointment, 1 Dose once daily., Disp: , Rfl:     ibuprofen (ADVIL,MOTRIN) 800 MG tablet, Take 1 tablet (800 mg total) by mouth 3 (three) times daily as needed for Pain (joint pain)., Disp: 90 tablet, Rfl: 1    ipratropium (ATROVENT) 0.03 %  nasal spray, USE 2 SPRAYS NASALLY TWICE A DAY, Disp: 90 mL, Rfl: 4    lisinopril (PRINIVIL,ZESTRIL) 40 MG tablet, Take 40 mg by mouth once daily., Disp: , Rfl:     metFORMIN (GLUCOPHAGE) 500 MG tablet, Take 1 tablet (500 mg total) by mouth once daily., Disp: 90 tablet, Rfl: 4    NIFEdipine (ADALAT CC) 90 MG TbSR, TAKE 1 TABLET DAILY, Disp: 90 tablet, Rfl: 4    sildenafil (VIAGRA) 100 MG tablet, Take 1 tablet (100 mg total) by mouth daily as needed., Disp: 24 tablet, Rfl: 3    SYSTANE ULTRA, PF, 0.4-0.3 % Dpet, , Disp: , Rfl:     tamsulosin (FLOMAX) 0.4 mg Cp24, Take 1 capsule (0.4 mg total) by mouth once daily., Disp: 90 capsule, Rfl: 3

## 2020-02-28 ENCOUNTER — PATIENT MESSAGE (OUTPATIENT)
Dept: FAMILY MEDICINE | Facility: CLINIC | Age: 65
End: 2020-02-28

## 2020-03-03 DIAGNOSIS — N52.9 ERECTILE DYSFUNCTION, UNSPECIFIED ERECTILE DYSFUNCTION TYPE: ICD-10-CM

## 2020-03-03 RX ORDER — SILDENAFIL 100 MG/1
100 TABLET, FILM COATED ORAL DAILY PRN
Qty: 24 TABLET | Refills: 3 | Status: SHIPPED | OUTPATIENT
Start: 2020-03-03 | End: 2020-12-07 | Stop reason: SDUPTHER

## 2020-04-17 ENCOUNTER — PATIENT MESSAGE (OUTPATIENT)
Dept: FAMILY MEDICINE | Facility: CLINIC | Age: 65
End: 2020-04-17

## 2020-05-28 ENCOUNTER — LAB VISIT (OUTPATIENT)
Dept: LAB | Facility: HOSPITAL | Age: 65
End: 2020-05-28
Attending: INTERNAL MEDICINE
Payer: MEDICARE

## 2020-05-28 DIAGNOSIS — I10 BENIGN ESSENTIAL HYPERTENSION: ICD-10-CM

## 2020-05-28 DIAGNOSIS — R73.01 IMPAIRED FASTING GLUCOSE: ICD-10-CM

## 2020-05-28 DIAGNOSIS — E78.00 HYPERCHOLESTEROLEMIA: ICD-10-CM

## 2020-05-28 LAB
ALBUMIN/CREAT UR: 7.1 UG/MG (ref 0–30)
ANION GAP SERPL CALC-SCNC: 8 MMOL/L (ref 8–16)
BUN SERPL-MCNC: 14 MG/DL (ref 8–23)
CALCIUM SERPL-MCNC: 9.4 MG/DL (ref 8.7–10.5)
CHLORIDE SERPL-SCNC: 107 MMOL/L (ref 95–110)
CHOLEST SERPL-MCNC: 158 MG/DL (ref 120–199)
CHOLEST/HDLC SERPL: 3.6 {RATIO} (ref 2–5)
CO2 SERPL-SCNC: 23 MMOL/L (ref 23–29)
CREAT SERPL-MCNC: 1 MG/DL (ref 0.5–1.4)
CREAT UR-MCNC: 281 MG/DL (ref 23–375)
EST. GFR  (AFRICAN AMERICAN): >60 ML/MIN/1.73 M^2
EST. GFR  (NON AFRICAN AMERICAN): >60 ML/MIN/1.73 M^2
ESTIMATED AVG GLUCOSE: 134 MG/DL (ref 68–131)
GLUCOSE SERPL-MCNC: 134 MG/DL (ref 70–110)
HBA1C MFR BLD HPLC: 6.3 % (ref 4.5–6.2)
HDLC SERPL-MCNC: 44 MG/DL (ref 40–75)
HDLC SERPL: 27.8 % (ref 20–50)
LDLC SERPL CALC-MCNC: 85.8 MG/DL (ref 63–159)
MICROALBUMIN UR DL<=1MG/L-MCNC: 20 UG/ML
NONHDLC SERPL-MCNC: 114 MG/DL
POTASSIUM SERPL-SCNC: 3.9 MMOL/L (ref 3.5–5.1)
SODIUM SERPL-SCNC: 138 MMOL/L (ref 136–145)
TRIGL SERPL-MCNC: 141 MG/DL (ref 30–150)

## 2020-05-28 PROCEDURE — 83036 HEMOGLOBIN GLYCOSYLATED A1C: CPT

## 2020-05-28 PROCEDURE — 82043 UR ALBUMIN QUANTITATIVE: CPT

## 2020-05-28 PROCEDURE — 80048 BASIC METABOLIC PNL TOTAL CA: CPT

## 2020-05-28 PROCEDURE — 36415 COLL VENOUS BLD VENIPUNCTURE: CPT

## 2020-05-28 PROCEDURE — 80061 LIPID PANEL: CPT

## 2020-06-01 ENCOUNTER — OFFICE VISIT (OUTPATIENT)
Dept: FAMILY MEDICINE | Facility: CLINIC | Age: 65
End: 2020-06-01
Payer: MEDICARE

## 2020-06-01 VITALS
TEMPERATURE: 99 F | WEIGHT: 232 LBS | SYSTOLIC BLOOD PRESSURE: 131 MMHG | HEART RATE: 86 BPM | DIASTOLIC BLOOD PRESSURE: 79 MMHG | BODY MASS INDEX: 31.42 KG/M2 | HEIGHT: 72 IN

## 2020-06-01 DIAGNOSIS — E79.0 HYPERURICEMIA: ICD-10-CM

## 2020-06-01 DIAGNOSIS — E78.00 HYPERCHOLESTEROLEMIA: Chronic | ICD-10-CM

## 2020-06-01 DIAGNOSIS — I10 BENIGN ESSENTIAL HYPERTENSION: Primary | Chronic | ICD-10-CM

## 2020-06-01 DIAGNOSIS — I49.49 EXTRASYSTOLES: ICD-10-CM

## 2020-06-01 DIAGNOSIS — R73.01 IMPAIRED FASTING GLUCOSE: ICD-10-CM

## 2020-06-01 PROCEDURE — 99213 PR OFFICE/OUTPT VISIT, EST, LEVL III, 20-29 MIN: ICD-10-PCS | Mod: S$PBB,,, | Performed by: INTERNAL MEDICINE

## 2020-06-01 PROCEDURE — 99213 OFFICE O/P EST LOW 20 MIN: CPT | Mod: S$PBB,,, | Performed by: INTERNAL MEDICINE

## 2020-06-01 PROCEDURE — 99213 OFFICE O/P EST LOW 20 MIN: CPT | Performed by: INTERNAL MEDICINE

## 2020-06-01 RX ORDER — FLUTICASONE PROPIONATE 50 MCG
SPRAY, SUSPENSION (ML) NASAL
COMMUNITY
Start: 2020-05-21

## 2020-06-01 RX ORDER — ALLOPURINOL 100 MG/1
TABLET ORAL
COMMUNITY
Start: 2020-05-21

## 2020-06-01 NOTE — PATIENT INSTRUCTIONS
Taking Your Blood Pressure  Blood pressure is the force of blood against the artery wall as it moves from the heart through the blood vessels. You can take your own blood pressure reading using a digital monitor. Take your readings the same each time, using the same arm. Take readings as often as your healthcare provider instructs.  About blood pressure monitors  Blood pressure monitors are designed for certain ages and cases. You can find monitors for older adults, for pregnant women, and for children. Make sure the one you choose is the right one for your age and situation.  The American Heart Association recommends an automatic cuff monitor that fits on your upper arm (bicep). The cuff should fit your arm size. A cuff thats too large or too small will not give an accurate reading. Measure around your upper arm to find your size.  Monitors that attach to your finger or wrist are not as accurate as monitors for your upper arm.  Ask your healthcare provider for help in choosing a monitor. Bring your monitor to your next provider visit if you need help in using it the correct way.  The steps below are general instructions for using an automatic digital monitor.  Step 1. Relax    · Take your blood pressure at the same time every day, such as in the morning or evening, or at the time your healthcare provider recommends.  · Wait at least a half-hour after smoking, eating, or exercising. Don't drink coffee, tea, soda, or other caffeinated beverages before checking your blood pressure.  · Sit comfortably at a table with both feet on the floor. Do not cross your legs or feet. Place the monitor near you.  · Rest for a few minutes before you begin.  Step 2. Wrap the cuff    · Place your arm on the table, palm up. Your arm should be at the level of your heart. Wrap the cuff around your upper arm, just above your elbow. Its best done on bare skin, not over clothing. Most cuffs will indicate where the brachial artery (the  blood vessel in the middle of the arm at the inner side of the elbow) should line up with the cuff. Look in your monitor's instruction booklet for an illustration. You can also bring your cuff to your healthcare provider and have them show you how to correctly place the cuff.  Step 3. Inflate the cuff    · Push the button that starts the pump.  · The cuff will tighten, then loosen.  · The numbers will change. When they stop changing, your blood pressure reading will appear.  · Take 2 or 3 readings one minute apart.  Step 4. Write down the results of each reading    · Write down your blood pressure numbers for each reading. Note the date and time. Keep your results in one place, such as a notebook. Even if your monitor has a built-in memory, keep a hard copy of the readings.  · Remove the cuff from your arm. Turn off the machine.  · Bring your blood pressure records with your healthcare providers at each visit.  · If you start a new blood pressure medicine, note the day you started the new medicine. Also note the day if you change the dose of your medicine. This information goes on your blood pressure recording sheet. This will help your healthcare provider monitor how well the medicine changes are working.  · Ask your healthcare provider what numbers should prompt you to call him or her. Also ask what numbers should prompt you to get help right away.  Date Last Reviewed: 11/1/2016  © 2017-3824 [employer]. [address] [place] [address], [place] [address]. All rights reserved. This information is not intended as a substitute for professional medical care. Always follow your healthcare professional's instructions.

## 2020-06-01 NOTE — PROGRESS NOTES
Subjective:       Patient ID: Cipriano Gunderson is a 65 y.o. male.    Chief Complaint: Hypertension; Hyperlipidemia; Hyperglycemia; and Hyperuricemia    Mr. Cipriano Gunderson is a 65-year-old gentleman who comes for follow-up.  Underlying medical issues of hypertension, dyslipidemia, intermittent sinus allergies and elevated blood sugar have been noted.    Due to COVID 19 lock down he has gained approximately 8 lb of weight.  He does plan to get back to the gymnasium once the situation clears up.  I have reviewed his recent labs and his hemoglobin A1c 6.4 with fasting blood sugar 134.  Lipid panel is excellent.  General chemistry is good.  No recent attacks of gout.  He does take Viagra as needed.    Hypertension   This is a chronic problem. The current episode started more than 1 year ago. Pertinent negatives include no chest pain, headaches, neck pain or palpitations. Risk factors for coronary artery disease include male gender. Past treatments include calcium channel blockers. The current treatment provides moderate improvement.   Hyperlipidemia   This is a chronic problem. The current episode started more than 1 year ago. The problem is controlled. He has no history of obesity. Pertinent negatives include no chest pain. Current antihyperlipidemic treatment includes statins. The current treatment provides moderate improvement of lipids. Risk factors for coronary artery disease include hypertension, male sex and dyslipidemia.       Past Medical History:   Diagnosis Date    Depression     Diabetes mellitus, type 2     GERD (gastroesophageal reflux disease)     Hyperlipidemia     Hypertension      Social History     Socioeconomic History    Marital status:      Spouse name: Julieta    Number of children: 2    Years of education: Not on file    Highest education level: Not on file   Occupational History    Occupation: [employer]   Social Needs    Financial resource strain: Not hard at all    Food  insecurity:     Worry: Never true     Inability: Never true    Transportation needs:     Medical: No     Non-medical: No   Tobacco Use    Smoking status: Never Smoker    Smokeless tobacco: Never Used   Substance and Sexual Activity    Alcohol use: Yes     Alcohol/week: 1.0 - 2.0 standard drinks     Types: 1 - 2 Glasses of wine per week     Frequency: 2-3 times a week     Drinks per session: 1 or 2     Binge frequency: Never     Comment: weekends    Drug use: No    Sexual activity: Yes     Partners: Female   Lifestyle    Physical activity:     Days per week: 3 days     Minutes per session: 60 min    Stress: Not at all   Relationships    Social connections:     Talks on phone: Once a week     Gets together: Never     Attends [employer]an service: Not on file     Active member of club or organization: Not on file     Attends meetings of clubs or organizations: Never     Relationship status:    Other Topics Concern    Not on file   Social History Narrative    Not on file     Past Surgical History:   Procedure Laterality Date    DENTAL SURGERY  04/05/2019 5/24/2019    HERNIA REPAIR      NASAL SEPTUM SURGERY       Family History   Problem Relation Age of Onset    Hypertension Mother     Cancer Mother         ??    Hypertension Father     Heart disease Father     Heart failure Father        Review of Systems   Constitutional: Negative for activity change, diaphoresis, fatigue and unexpected weight change (gained 8 lbs post COVID isolation).   HENT: Negative for congestion, hearing loss, rhinorrhea and trouble swallowing.    Eyes: Negative for discharge, redness and visual disturbance.   Respiratory: Negative for cough, chest tightness and wheezing.    Cardiovascular: Negative for chest pain, palpitations and leg swelling.   Gastrointestinal: Negative for blood in stool, constipation, diarrhea and vomiting.   Endocrine: Negative for polydipsia and polyuria.   Genitourinary: Negative for difficulty  urinating, hematuria and urgency.   Musculoskeletal: Negative for arthralgias, joint swelling and neck pain.   Neurological: Negative for weakness and headaches.   Psychiatric/Behavioral: Negative for confusion and dysphoric mood.         Objective:      Blood pressure 131/79, pulse 86, temperature 98.7 °F (37.1 °C), height 6' (1.829 m), weight 105.2 kg (232 lb). Body mass index is 31.46 kg/m².  Physical Exam   Constitutional: He appears well-developed and well-nourished.   BMI of 31.46   HENT:   Head: Normocephalic and atraumatic.   Mouth/Throat: No oropharyngeal exudate.   Eyes: Conjunctivae and EOM are normal.   Neck: Normal range of motion. Neck supple. No JVD present. No tracheal deviation present. No thyromegaly present.   Cardiovascular: Normal rate, regular rhythm, S1 normal and S2 normal.  Occasional extrasystoles are present. PMI is not displaced.   Pulmonary/Chest: Effort normal and breath sounds normal. No respiratory distress. He has no wheezes. He has no rales.   Abdominal: Soft. Bowel sounds are normal. There is no tenderness.   Musculoskeletal:        Right elbow: He exhibits swelling and effusion.   Neurological: He is alert.   Skin: Skin is warm and dry.   Psychiatric: He has a normal mood and affect.   Nursing note and vitals reviewed.        Assessment:       1. Benign essential hypertension    2. Hypercholesterolemia    3. Impaired fasting glucose    4. Extrasystoles    5. Hyperuricemia           Lab Visit on 05/28/2020   Component Date Value Ref Range Status    Sodium 05/28/2020 138  136 - 145 mmol/L Final    Potassium 05/28/2020 3.9  3.5 - 5.1 mmol/L Final    Chloride 05/28/2020 107  95 - 110 mmol/L Final    CO2 05/28/2020 23  23 - 29 mmol/L Final    Glucose 05/28/2020 134* 70 - 110 mg/dL Final    BUN, Bld 05/28/2020 14  8 - 23 mg/dL Final    Creatinine 05/28/2020 1.0  0.5 - 1.4 mg/dL Final    Calcium 05/28/2020 9.4  8.7 - 10.5 mg/dL Final    Anion Gap 05/28/2020 8  8 - 16 mmol/L  Final    eGFR if African American 05/28/2020 >60.0  >60 mL/min/1.73 m^2 Final    eGFR if non African American 05/28/2020 >60.0  >60 mL/min/1.73 m^2 Final    Cholesterol 05/28/2020 158  120 - 199 mg/dL Final    Triglycerides 05/28/2020 141  30 - 150 mg/dL Final    HDL 05/28/2020 44  40 - 75 mg/dL Final    LDL Cholesterol 05/28/2020 85.8  63.0 - 159.0 mg/dL Final    Hdl/Cholesterol Ratio 05/28/2020 27.8  20.0 - 50.0 % Final    Total Cholesterol/HDL Ratio 05/28/2020 3.6  2.0 - 5.0 Final    Non-HDL Cholesterol 05/28/2020 114  mg/dL Final    Hemoglobin A1C 05/28/2020 6.3* 4.5 - 6.2 % Final    Estimated Avg Glucose 05/28/2020 134* 68 - 131 mg/dL Final   Lab Visit on 05/28/2020   Component Date Value Ref Range Status    Microalbum.,U,Random 05/28/2020 20.0  ug/mL Final    Creatinine, Random Ur 05/28/2020 281.0  23.0 - 375.0 mg/dL Final    Microalb Creat Ratio 05/28/2020 7.1  0.0 - 30.0 ug/mg Final         Plan:           Benign essential hypertension  Comments:  Patient's blood pressures are under control.  Orders:  -     Basic metabolic panel; Future; Expected date: 08/24/2020    Hypercholesterolemia  Comments:  Patient's lipid panel is excellent    Impaired fasting glucose  Comments:  Patient's hemoglobin A1c 6.4.  He has gained some weight recently.  Whether next time he comes back for follow-up hopefully the situation is better.  Orders:  -     Hemoglobin A1C; Future; Expected date: 08/24/2020    Extrasystoles  Comments:  Patient does have a few ectopics.  This will be kept under observation.  Avoid excess caffeine.    Hyperuricemia  Comments:  No recent attack of gout.  Orders:  -     Uric acid; Future; Expected date: 08/24/2020      Patient's labs have been reviewed as above.  He is planning to go back to the gym and hopefully as the situation improves, he loses the 8 lb that he has gained over the last couple of months due to COVID-19 lock down.    He does have few extra systoles and this will be kept  under observation with no change in status.  He will just continue to watch caffeine or any the stimulant.    He is doing okay on his medications.    He takes the Viagra without any medical issues and problems.  He is doing okay on his medications.    Right elbow pain is doing okay.    If all goes well, I will see him in 4-6 months time.      Current Outpatient Medications:     allopurinoL (ZYLOPRIM) 100 MG tablet, , Disp: , Rfl:     aspirin (ECOTRIN) 81 MG EC tablet, Take 1 tablet by mouth Daily., Disp: , Rfl:     atorvastatin (LIPITOR) 10 MG tablet, TAKE 1 TABLET DAILY, Disp: 90 tablet, Rfl: 4    cetirizine (ZYRTEC) 10 MG tablet, Take 10 mg by mouth once daily., Disp: , Rfl:     fluticasone propionate (FLONASE) 50 mcg/actuation nasal spray, , Disp: , Rfl:     hydrocortisone valerate (WEST-KIMANI) 0.2 % ointment, 1 Dose once daily., Disp: , Rfl:     ibuprofen (ADVIL,MOTRIN) 800 MG tablet, Take 1 tablet (800 mg total) by mouth 3 (three) times daily as needed for Pain (joint pain)., Disp: 90 tablet, Rfl: 1    ipratropium (ATROVENT) 0.03 % nasal spray, USE 2 SPRAYS NASALLY TWICE A DAY, Disp: 90 mL, Rfl: 4    lisinopril (PRINIVIL,ZESTRIL) 40 MG tablet, Take 40 mg by mouth once daily., Disp: , Rfl:     metFORMIN (GLUCOPHAGE) 500 MG tablet, Take 1 tablet (500 mg total) by mouth once daily., Disp: 90 tablet, Rfl: 4    NIFEdipine (ADALAT CC) 90 MG TbSR, TAKE 1 TABLET DAILY, Disp: 90 tablet, Rfl: 4    sildenafil (VIAGRA) 100 MG tablet, Take 1 tablet (100 mg total) by mouth daily as needed., Disp: 24 tablet, Rfl: 3    SYSTANE ULTRA, PF, 0.4-0.3 % Dpet, , Disp: , Rfl:     tamsulosin (FLOMAX) 0.4 mg Cp24, Take 1 capsule (0.4 mg total) by mouth once daily., Disp: 90 capsule, Rfl: 3

## 2020-09-28 ENCOUNTER — LAB VISIT (OUTPATIENT)
Dept: LAB | Facility: HOSPITAL | Age: 65
End: 2020-09-28
Attending: INTERNAL MEDICINE
Payer: MEDICARE

## 2020-09-28 ENCOUNTER — PATIENT MESSAGE (OUTPATIENT)
Dept: FAMILY MEDICINE | Facility: CLINIC | Age: 65
End: 2020-09-28

## 2020-09-28 DIAGNOSIS — R73.01 IMPAIRED FASTING GLUCOSE: ICD-10-CM

## 2020-09-28 DIAGNOSIS — I10 BENIGN ESSENTIAL HYPERTENSION: Chronic | ICD-10-CM

## 2020-09-28 DIAGNOSIS — E79.0 HYPERURICEMIA: ICD-10-CM

## 2020-09-28 LAB
ANION GAP SERPL CALC-SCNC: 11 MMOL/L (ref 8–16)
BUN SERPL-MCNC: 15 MG/DL (ref 8–23)
CALCIUM SERPL-MCNC: 10.3 MG/DL (ref 8.7–10.5)
CHLORIDE SERPL-SCNC: 107 MMOL/L (ref 95–110)
CO2 SERPL-SCNC: 22 MMOL/L (ref 23–29)
CREAT SERPL-MCNC: 1.1 MG/DL (ref 0.5–1.4)
EST. GFR  (AFRICAN AMERICAN): >60 ML/MIN/1.73 M^2
EST. GFR  (NON AFRICAN AMERICAN): >60 ML/MIN/1.73 M^2
GLUCOSE SERPL-MCNC: 100 MG/DL (ref 70–110)
POTASSIUM SERPL-SCNC: 3.9 MMOL/L (ref 3.5–5.1)
SODIUM SERPL-SCNC: 140 MMOL/L (ref 136–145)
URATE SERPL-MCNC: 7.8 MG/DL (ref 3.4–7)

## 2020-09-28 PROCEDURE — 80048 BASIC METABOLIC PNL TOTAL CA: CPT

## 2020-09-28 PROCEDURE — 84550 ASSAY OF BLOOD/URIC ACID: CPT

## 2020-09-28 PROCEDURE — 83036 HEMOGLOBIN GLYCOSYLATED A1C: CPT

## 2020-09-28 PROCEDURE — 36415 COLL VENOUS BLD VENIPUNCTURE: CPT

## 2020-09-29 ENCOUNTER — PATIENT MESSAGE (OUTPATIENT)
Dept: FAMILY MEDICINE | Facility: CLINIC | Age: 65
End: 2020-09-29

## 2020-09-29 ENCOUNTER — TELEPHONE (OUTPATIENT)
Dept: FAMILY MEDICINE | Facility: CLINIC | Age: 65
End: 2020-09-29

## 2020-09-29 LAB
ESTIMATED AVG GLUCOSE: 123 MG/DL (ref 68–131)
HBA1C MFR BLD HPLC: 5.9 % (ref 4.5–6.2)

## 2020-09-29 NOTE — TELEPHONE ENCOUNTER
----- Message from Chuckie Alberto MD sent at 9/28/2020  3:25 PM CDT -----  The results are within acceptable range.  Please keep regular follow up.

## 2020-10-01 ENCOUNTER — OFFICE VISIT (OUTPATIENT)
Dept: FAMILY MEDICINE | Facility: CLINIC | Age: 65
End: 2020-10-01
Payer: MEDICARE

## 2020-10-01 VITALS
SYSTOLIC BLOOD PRESSURE: 135 MMHG | HEIGHT: 72 IN | BODY MASS INDEX: 30.61 KG/M2 | WEIGHT: 226 LBS | DIASTOLIC BLOOD PRESSURE: 80 MMHG | HEART RATE: 71 BPM | TEMPERATURE: 98 F

## 2020-10-01 DIAGNOSIS — E78.00 HYPERCHOLESTEROLEMIA: ICD-10-CM

## 2020-10-01 DIAGNOSIS — E79.0 HYPERURICEMIA: ICD-10-CM

## 2020-10-01 DIAGNOSIS — I10 BENIGN ESSENTIAL HYPERTENSION: Primary | ICD-10-CM

## 2020-10-01 DIAGNOSIS — R73.01 IMPAIRED FASTING GLUCOSE: ICD-10-CM

## 2020-10-01 PROCEDURE — 99214 OFFICE O/P EST MOD 30 MIN: CPT | Performed by: INTERNAL MEDICINE

## 2020-10-01 PROCEDURE — 99213 OFFICE O/P EST LOW 20 MIN: CPT | Mod: S$PBB,,, | Performed by: INTERNAL MEDICINE

## 2020-10-01 PROCEDURE — 99213 PR OFFICE/OUTPT VISIT, EST, LEVL III, 20-29 MIN: ICD-10-PCS | Mod: S$PBB,,, | Performed by: INTERNAL MEDICINE

## 2020-10-01 RX ORDER — TRAZODONE HYDROCHLORIDE 50 MG/1
1 TABLET ORAL DAILY PRN
COMMUNITY
Start: 2020-09-04

## 2020-10-01 NOTE — PATIENT INSTRUCTIONS
Prediabetes  You have been diagnosed with prediabetes. This means that the level of sugar (glucose) in your blood is too high. If you have prediabetes, you are at risk for developing type 2 diabetes. Type 2 diabetes is diagnosed when the level of glucose in the blood reaches a certain high level. With prediabetes, it hasnt reached this point yet, but it is higher than normal. It is vital to make lifestyle changes to lower your blood sugar, improve your health, and prevent diabetes. This sheet will tell you more.      Why worry about prediabetes?  Prediabetes is a disease where the bodys cells have trouble using glucose in the blood for energy. As a result, too much glucose stays in the blood and can affect how your heart and blood vessels work. Without changes in diet and lifestyle, the problem can get worse. Once you have type 2 diabetes, it is chronic (ongoing) and needs to be managed for the rest of your life. Diabetes can harm the body and your health by damaging organs, such as your eyes and kidneys. It makes you more likely to have heart disease. And it can damage nerves and blood vessels.  Who is a risk for prediabetes?  The exact cause of prediabetes is not clear. But certain risk factors make a person more likely to have it. These include:  · A family history of type 2 diabetes  · Being overweight  · Being over age 45  · Have hypertension or elevated cholesterol   · Having had gestational diabetes  · Not being physically active  · Being ,  American, , , , or   Diagnosing prediabetes  Prediabetes may have no symptoms or you may have some of the symptoms of diabetes. The diagnosis is made with a blood test. You may have one or more of these blood tests:   · Fasting glucose test. Blood is taken and tested after you have fasted (not eaten) for at least 8 hours. A normal test result is 99 milligrams per deciliter (mg/dL) or lower.  Prediabetes is 100 mg/dL to 125 mg/dL. Diabetes is 126 mg/dL or higher.  · Glucose tolerance test. Your blood sugar is measured before and after you drink a very sugary liquid. A normal test result is 139 milligrams per deciliter (mg/dL) or lower. Prediabetes is 140 mg/dL to 199 mg/dL. Diabetes is 200 mg/dL or higher.  · Hemoglobin A1c (HbA1c). Your HbA1c is normal if it is below 5.7%. Prediabetes is 5.7% to 6.4%. Diabetes is 6.5% or higher.   Treating prediabetes  The best way to treat prediabetes is to lose at least 5% to 7% of your current weight and be more physically active by getting at least 150 minutes a week of physical activity. When sitting for long periods of time, get up for short sessions of light activity every 30 minutes. These changes help the bodys cells use blood sugar better. Even a small amount of weight loss can help. Work with your healthcare provider to make a plan to eat well and be more active. Keep in mind that small changes can add up. Other changes in your lifestyle (or even taking certain medicines, such as metformin) may make you less likely to develop diabetes. Your healthcare provider can talk with you about these.  Follow-up  If it is untreated, prediabetes can turn into diabetes. This is a serious health condition. Take steps to stop this from happening. Follow the treatment plan you have been given. You may have your blood glucose tested again in about 12 to 18 months.  Symptoms of diabetes  Let your healthcare provider know if you have any of the following:  · Always feel very tired  · Feel very thirsty or hungry much of the time  · Have to urinate often  · Lose weight for no reason  · Feel numbness or tingling in your fingers or toes  · Have cuts or bruises that dont seem to heal  · Have blurry vision   Date Last Reviewed: 5/1/2016  © 1700-7062 [employer]. [address] [place] [address], [place] [address]. All rights reserved. This information is not intended as a  substitute for professional medical care. Always follow your healthcare professional's instructions.

## 2020-10-01 NOTE — PROGRESS NOTES
Subjective:       Patient ID: Cipriano Gunderson is a 65 y.o. male.    Chief Complaint: Hypertension (lab review ), Hyperlipidemia, Hyperuricemia, and Prediabetes    Patient is a 65-year-old male who comes for follow-up.  Underlying medical issues of prediabetes, hypertension and dyslipidemia have been noted.  Currently he is on metformin 500 mg per day.  He continues to exercise and watch his diet.  His hemoglobin A1c has come down to 5.9.    He also has sinus allergies for which he takes Zyrtec and Flonase.    For erectile dysfunction he may use sildenafil occasionally.  Does not find much relief with generic brand but apparently branded medication give him more benefits.    Hypertension  The current episode started more than 1 year ago. The problem is controlled. Pertinent negatives include no chest pain, headaches, neck pain or palpitations. Risk factors for coronary artery disease include male gender and dyslipidemia. Past treatments include lifestyle changes and ACE inhibitors. The current treatment provides moderate improvement. Compliance problems include psychosocial issues.    Hyperlipidemia  This is a chronic problem. The current episode started more than 1 year ago. The problem is controlled. He has no history of obesity. Pertinent negatives include no chest pain. The current treatment provides moderate improvement of lipids. Risk factors for coronary artery disease include male sex, hypertension and dyslipidemia.       Past Medical History:   Diagnosis Date    Depression     Diabetes mellitus, type 2     GERD (gastroesophageal reflux disease)     Hyperlipidemia     Hypertension      Social History     Socioeconomic History    Marital status:      Spouse name: Julieta    Number of children: 2    Years of education: Not on file    Highest education level: Not on file   Occupational History    Occupation: [employer]   Social Needs    Financial resource strain: Not hard at all    Food  insecurity     Worry: Never true     Inability: Never true    Transportation needs     Medical: No     Non-medical: No   Tobacco Use    Smoking status: Never Smoker    Smokeless tobacco: Never Used   Substance and Sexual Activity    Alcohol use: Yes     Alcohol/week: 1.0 - 2.0 standard drinks     Types: 1 - 2 Glasses of wine per week     Frequency: 2-3 times a week     Drinks per session: 1 or 2     Binge frequency: Never     Comment: weekends    Drug use: No    Sexual activity: Yes     Partners: Female   Lifestyle    Physical activity     Days per week: 3 days     Minutes per session: 60 min    Stress: Not at all   Relationships    Social connections     Talks on phone: Once a week     Gets together: Never     Attends Islam service: Not on file     Active member of club or organization: Not on file     Attends meetings of clubs or organizations: Never     Relationship status:    Other Topics Concern    Not on file   Social History Narrative    Not on file     Past Surgical History:   Procedure Laterality Date    DENTAL SURGERY  04/05/2019 5/24/2019    HERNIA REPAIR      NASAL SEPTUM SURGERY       Family History   Problem Relation Age of Onset    Hypertension Mother     Cancer Mother         ??    Hypertension Father     Heart disease Father     Heart failure Father        Review of Systems   Constitutional: Negative for activity change, diaphoresis, fatigue and unexpected weight change (lost 6 lbs).   HENT: Negative for congestion, hearing loss, rhinorrhea and trouble swallowing.    Eyes: Negative for discharge, redness and visual disturbance.   Respiratory: Negative for cough, chest tightness and wheezing.    Cardiovascular: Negative for chest pain, palpitations and leg swelling.   Gastrointestinal: Negative for blood in stool, constipation, diarrhea and vomiting.   Endocrine: Negative for polydipsia and polyuria.        Prediabetes.   Genitourinary: Negative for difficulty  urinating, hematuria and urgency.   Musculoskeletal: Negative for arthralgias, joint swelling and neck pain.   Neurological: Negative for weakness and headaches.   Psychiatric/Behavioral: Negative for confusion and dysphoric mood.         Objective:      Blood pressure 135/80, pulse 71, temperature 97.8 °F (36.6 °C), height 6' (1.829 m), weight 102.5 kg (226 lb). Body mass index is 30.65 kg/m².  Physical Exam  Vitals signs and nursing note reviewed.   Constitutional:       Appearance: He is well-developed.      Comments: BMI of 30.65   HENT:      Head: Normocephalic and atraumatic.      Mouth/Throat:      Pharynx: No oropharyngeal exudate.   Eyes:      Conjunctiva/sclera: Conjunctivae normal.   Neck:      Musculoskeletal: Normal range of motion and neck supple.      Thyroid: No thyromegaly.      Vascular: No JVD.      Trachea: No tracheal deviation.   Cardiovascular:      Rate and Rhythm: Normal rate and regular rhythm. Occasional extrasystoles are present.     Chest Wall: PMI is not displaced.      Heart sounds: S1 normal and S2 normal.      Comments: Occasional and rare ectopics.  Pulmonary:      Effort: Pulmonary effort is normal. No respiratory distress.      Breath sounds: Normal breath sounds. No wheezing or rales.   Abdominal:      General: Bowel sounds are normal.      Palpations: Abdomen is soft.      Tenderness: There is no abdominal tenderness.   Musculoskeletal:      Right elbow: He exhibits swelling and effusion.   Skin:     General: Skin is warm and dry.   Neurological:      Mental Status: He is alert.           Assessment:       1. Benign essential hypertension    2. Hypercholesterolemia    3. Impaired fasting glucose    4. Hyperuricemia           Lab Visit on 09/28/2020   Component Date Value Ref Range Status    Hemoglobin A1C 09/28/2020 5.9  4.5 - 6.2 % Final    Estimated Avg Glucose 09/28/2020 123  68 - 131 mg/dL Final    Uric Acid 09/28/2020 7.8* 3.4 - 7.0 mg/dL Final    Sodium 09/28/2020 140   136 - 145 mmol/L Final    Potassium 09/28/2020 3.9  3.5 - 5.1 mmol/L Final    Chloride 09/28/2020 107  95 - 110 mmol/L Final    CO2 09/28/2020 22* 23 - 29 mmol/L Final    Glucose 09/28/2020 100  70 - 110 mg/dL Final    BUN, Bld 09/28/2020 15  8 - 23 mg/dL Final    Creatinine 09/28/2020 1.1  0.5 - 1.4 mg/dL Final    Calcium 09/28/2020 10.3  8.7 - 10.5 mg/dL Final    Anion Gap 09/28/2020 11  8 - 16 mmol/L Final    eGFR if African American 09/28/2020 >60.0  >60 mL/min/1.73 m^2 Final    eGFR if non African American 09/28/2020 >60.0  >60 mL/min/1.73 m^2 Final         Plan:           Benign essential hypertension    Hypercholesterolemia    Impaired fasting glucose    Hyperuricemia      Patient's blood pressures are doing okay.  He has started bicycling as a form of exercise during this COVID-19 times.  He stopped going to the gym where the control for environment and masking is not strict.  I agree with that.    His uric acid levels have gone somewhat up though there is no symptom of gout at this point.  He continues with his allopurinol.    His hemoglobin A1c is the best at this point with 5.9 and he has broken the 6.0 barrier.  He is currently taking metformin 1 pill a day.  I am not sure whether I will be able to authorize him the more expensive metformin extended release or GLUMETZA with minimal advantage at this point.  I might as well as treat him with some other medications which are more affordable and reasonable.    At any rate his doing pretty good with diabetes and blood sugars and his exercise and watching his diet.    He does take his medications as prescribed including atorvastatin 10 mg, Flonase nasal spray and ibuprofen as needed.    As far as vaccinations are concerned, he got the 1st dosage of shingles vaccine.  The next dosage will be between 2-6 months after the 1st dosage.  He also is updated on influenza vaccination.      Patient's concern was if he should cut down metformin to half a pill  or continue with 1 pill.  I am thrilled with his progress that he has bought his hemoglobin A1c less than 6.0.  Currently his hemoglobin A1c is 5.9.  I would like to continue with 1 whole pill a day at this point.  Certainly he will continue to watch his diet and continue with his exercise efforts.  Holidays are coming at this point with the diet and exercise effort are reduced.    I am hoping that when I see him back in 4 months he might have lost another 3 or 4 lb of weight with more lean muscle mass.  I will check a hemoglobin A1c at that point including uric acid level.  If his hemoglobin A1c comes less than 5.7 or so.  Then it is time to perhaps cut down the metformin to half a pill or even eliminate it completely provided he continues with diet and exercise efforts.    Follow-up in 4 months.        Current Outpatient Medications:     allopurinoL (ZYLOPRIM) 100 MG tablet, , Disp: , Rfl:     aspirin (ECOTRIN) 81 MG EC tablet, Take 1 tablet by mouth Daily., Disp: , Rfl:     atorvastatin (LIPITOR) 10 MG tablet, TAKE 1 TABLET DAILY, Disp: 90 tablet, Rfl: 4    cetirizine (ZYRTEC) 10 MG tablet, Take 10 mg by mouth once daily., Disp: , Rfl:     fluticasone propionate (FLONASE) 50 mcg/actuation nasal spray, , Disp: , Rfl:     hydrocortisone valerate (WEST-KIMANI) 0.2 % ointment, 1 Dose once daily., Disp: , Rfl:     ibuprofen (ADVIL,MOTRIN) 800 MG tablet, Take 1 tablet (800 mg total) by mouth 3 (three) times daily as needed for Pain (joint pain)., Disp: 90 tablet, Rfl: 1    ipratropium (ATROVENT) 0.03 % nasal spray, USE 2 SPRAYS NASALLY TWICE A DAY, Disp: 90 mL, Rfl: 4    lisinopril (PRINIVIL,ZESTRIL) 40 MG tablet, Take 40 mg by mouth once daily., Disp: , Rfl:     metFORMIN (GLUCOPHAGE) 500 MG tablet, Take 1 tablet (500 mg total) by mouth once daily., Disp: 90 tablet, Rfl: 4    NIFEdipine (ADALAT CC) 90 MG TbSR, TAKE 1 TABLET DAILY, Disp: 90 tablet, Rfl: 4    sildenafil (VIAGRA) 100 MG tablet, Take 1 tablet  (100 mg total) by mouth daily as needed., Disp: 24 tablet, Rfl: 3    SYSTANE ULTRA, PF, 0.4-0.3 % Dpet, , Disp: , Rfl:     tamsulosin (FLOMAX) 0.4 mg Cp24, Take 1 capsule (0.4 mg total) by mouth once daily., Disp: 90 capsule, Rfl: 3    traZODone (DESYREL) 50 MG tablet, 1 tablet daily as needed., Disp: , Rfl:

## 2020-12-07 ENCOUNTER — PATIENT MESSAGE (OUTPATIENT)
Dept: FAMILY MEDICINE | Facility: CLINIC | Age: 65
End: 2020-12-07

## 2020-12-07 DIAGNOSIS — N52.9 ERECTILE DYSFUNCTION, UNSPECIFIED ERECTILE DYSFUNCTION TYPE: ICD-10-CM

## 2020-12-07 RX ORDER — SILDENAFIL 100 MG/1
100 TABLET, FILM COATED ORAL DAILY PRN
Qty: 24 TABLET | Refills: 3 | Status: SHIPPED | OUTPATIENT
Start: 2020-12-07 | End: 2021-06-02

## 2021-01-26 ENCOUNTER — LAB VISIT (OUTPATIENT)
Dept: LAB | Facility: HOSPITAL | Age: 66
End: 2021-01-26
Attending: INTERNAL MEDICINE
Payer: MEDICARE

## 2021-01-26 DIAGNOSIS — E79.0 HYPERURICEMIA: ICD-10-CM

## 2021-01-26 DIAGNOSIS — R73.01 IMPAIRED FASTING GLUCOSE: ICD-10-CM

## 2021-01-26 LAB — URATE SERPL-MCNC: 5.4 MG/DL (ref 3.4–7)

## 2021-01-26 PROCEDURE — 84550 ASSAY OF BLOOD/URIC ACID: CPT

## 2021-01-26 PROCEDURE — 36415 COLL VENOUS BLD VENIPUNCTURE: CPT

## 2021-01-26 PROCEDURE — 83036 HEMOGLOBIN GLYCOSYLATED A1C: CPT

## 2021-01-27 LAB
ESTIMATED AVG GLUCOSE: 126 MG/DL (ref 68–131)
HBA1C MFR BLD HPLC: 6 % (ref 4.5–6.2)

## 2021-02-01 ENCOUNTER — OFFICE VISIT (OUTPATIENT)
Dept: FAMILY MEDICINE | Facility: CLINIC | Age: 66
End: 2021-02-01
Payer: MEDICARE

## 2021-02-01 ENCOUNTER — HOSPITAL ENCOUNTER (OUTPATIENT)
Dept: PREADMISSION TESTING | Facility: HOSPITAL | Age: 66
Discharge: HOME | End: 2021-02-01
Attending: INTERNAL MEDICINE
Payer: MEDICARE

## 2021-02-01 VITALS
SYSTOLIC BLOOD PRESSURE: 124 MMHG | WEIGHT: 218 LBS | TEMPERATURE: 98 F | DIASTOLIC BLOOD PRESSURE: 74 MMHG | HEIGHT: 72 IN | HEART RATE: 80 BPM | BODY MASS INDEX: 29.53 KG/M2

## 2021-02-01 DIAGNOSIS — J30.1 NON-SEASONAL ALLERGIC RHINITIS DUE TO POLLEN: ICD-10-CM

## 2021-02-01 DIAGNOSIS — E78.00 HYPERCHOLESTEROLEMIA: ICD-10-CM

## 2021-02-01 DIAGNOSIS — E79.0 HYPERURICEMIA: ICD-10-CM

## 2021-02-01 DIAGNOSIS — R07.89 OTHER CHEST PAIN: ICD-10-CM

## 2021-02-01 DIAGNOSIS — Z13.6 SCREENING FOR ISCHEMIC HEART DISEASE: ICD-10-CM

## 2021-02-01 DIAGNOSIS — N52.8 OTHER MALE ERECTILE DYSFUNCTION: ICD-10-CM

## 2021-02-01 DIAGNOSIS — R73.01 IMPAIRED FASTING GLUCOSE: ICD-10-CM

## 2021-02-01 DIAGNOSIS — I10 BENIGN ESSENTIAL HYPERTENSION: Primary | Chronic | ICD-10-CM

## 2021-02-01 PROCEDURE — 93005 ELECTROCARDIOGRAM TRACING: CPT | Performed by: INTERNAL MEDICINE

## 2021-02-01 PROCEDURE — 93010 ELECTROCARDIOGRAM REPORT: CPT | Mod: ,,, | Performed by: INTERNAL MEDICINE

## 2021-02-01 PROCEDURE — 99213 OFFICE O/P EST LOW 20 MIN: CPT | Performed by: INTERNAL MEDICINE

## 2021-02-01 PROCEDURE — 99214 PR OFFICE/OUTPT VISIT, EST, LEVL IV, 30-39 MIN: ICD-10-PCS | Mod: S$PBB,,, | Performed by: INTERNAL MEDICINE

## 2021-02-01 PROCEDURE — 99214 OFFICE O/P EST MOD 30 MIN: CPT | Mod: S$PBB,,, | Performed by: INTERNAL MEDICINE

## 2021-02-01 PROCEDURE — 93010 EKG 12-LEAD: ICD-10-PCS | Mod: ,,, | Performed by: INTERNAL MEDICINE

## 2021-02-05 ENCOUNTER — PATIENT MESSAGE (OUTPATIENT)
Dept: FAMILY MEDICINE | Facility: CLINIC | Age: 66
End: 2021-02-05

## 2021-02-13 ENCOUNTER — PATIENT MESSAGE (OUTPATIENT)
Dept: FAMILY MEDICINE | Facility: CLINIC | Age: 66
End: 2021-02-13

## 2021-02-18 ENCOUNTER — PATIENT MESSAGE (OUTPATIENT)
Dept: FAMILY MEDICINE | Facility: CLINIC | Age: 66
End: 2021-02-18

## 2021-03-03 ENCOUNTER — PATIENT MESSAGE (OUTPATIENT)
Dept: FAMILY MEDICINE | Facility: CLINIC | Age: 66
End: 2021-03-03

## 2021-03-03 ENCOUNTER — OFFICE VISIT (OUTPATIENT)
Dept: FAMILY MEDICINE | Facility: CLINIC | Age: 66
End: 2021-03-03
Payer: MEDICARE

## 2021-03-03 VITALS
HEIGHT: 72 IN | BODY MASS INDEX: 30.2 KG/M2 | TEMPERATURE: 98 F | WEIGHT: 223 LBS | DIASTOLIC BLOOD PRESSURE: 70 MMHG | SYSTOLIC BLOOD PRESSURE: 118 MMHG | HEART RATE: 86 BPM

## 2021-03-03 DIAGNOSIS — M25.562 ACUTE PAIN OF LEFT KNEE: Primary | ICD-10-CM

## 2021-03-03 PROCEDURE — 99213 OFFICE O/P EST LOW 20 MIN: CPT | Performed by: INTERNAL MEDICINE

## 2021-03-03 PROCEDURE — 99212 OFFICE O/P EST SF 10 MIN: CPT | Mod: S$PBB,,, | Performed by: INTERNAL MEDICINE

## 2021-03-03 PROCEDURE — 99212 PR OFFICE/OUTPT VISIT, EST, LEVL II, 10-19 MIN: ICD-10-PCS | Mod: S$PBB,,, | Performed by: INTERNAL MEDICINE

## 2021-03-03 RX ORDER — DICLOFENAC SODIUM 10 MG/G
2 GEL TOPICAL DAILY
Qty: 100 G | Refills: 0 | Status: SHIPPED | OUTPATIENT
Start: 2021-03-03

## 2021-03-03 RX ORDER — MELOXICAM 15 MG/1
15 TABLET ORAL DAILY
Qty: 10 TABLET | Refills: 1 | Status: SHIPPED | OUTPATIENT
Start: 2021-03-03 | End: 2023-01-24 | Stop reason: ALTCHOICE

## 2021-03-12 ENCOUNTER — PATIENT MESSAGE (OUTPATIENT)
Dept: FAMILY MEDICINE | Facility: CLINIC | Age: 66
End: 2021-03-12

## 2021-03-13 ENCOUNTER — PATIENT MESSAGE (OUTPATIENT)
Dept: FAMILY MEDICINE | Facility: CLINIC | Age: 66
End: 2021-03-13

## 2021-03-13 DIAGNOSIS — M17.10 UNILATERAL PRIMARY OSTEOARTHRITIS, UNSPECIFIED KNEE: ICD-10-CM

## 2021-03-17 ENCOUNTER — HOSPITAL ENCOUNTER (OUTPATIENT)
Dept: RADIOLOGY | Facility: HOSPITAL | Age: 66
Discharge: HOME | End: 2021-03-17
Attending: INTERNAL MEDICINE
Payer: MEDICARE

## 2021-03-17 ENCOUNTER — PATIENT MESSAGE (OUTPATIENT)
Dept: FAMILY MEDICINE | Facility: CLINIC | Age: 66
End: 2021-03-17

## 2021-03-17 DIAGNOSIS — M17.10 UNILATERAL PRIMARY OSTEOARTHRITIS, UNSPECIFIED KNEE: ICD-10-CM

## 2021-03-17 PROCEDURE — 73721 MRI JNT OF LWR EXTRE W/O DYE: CPT | Mod: TC,PO,LT

## 2021-06-02 ENCOUNTER — LAB VISIT (OUTPATIENT)
Dept: LAB | Facility: HOSPITAL | Age: 66
End: 2021-06-02
Attending: INTERNAL MEDICINE
Payer: MEDICARE

## 2021-06-02 ENCOUNTER — PATIENT MESSAGE (OUTPATIENT)
Dept: FAMILY MEDICINE | Facility: CLINIC | Age: 66
End: 2021-06-02

## 2021-06-02 ENCOUNTER — OFFICE VISIT (OUTPATIENT)
Dept: FAMILY MEDICINE | Facility: CLINIC | Age: 66
End: 2021-06-02
Payer: MEDICARE

## 2021-06-02 VITALS
DIASTOLIC BLOOD PRESSURE: 73 MMHG | WEIGHT: 211 LBS | SYSTOLIC BLOOD PRESSURE: 124 MMHG | HEART RATE: 74 BPM | HEIGHT: 72 IN | BODY MASS INDEX: 28.58 KG/M2

## 2021-06-02 DIAGNOSIS — I10 BENIGN ESSENTIAL HYPERTENSION: Primary | ICD-10-CM

## 2021-06-02 DIAGNOSIS — R73.01 IMPAIRED FASTING GLUCOSE: ICD-10-CM

## 2021-06-02 DIAGNOSIS — E78.00 HYPERCHOLESTEROLEMIA: ICD-10-CM

## 2021-06-02 DIAGNOSIS — E79.0 HYPERURICEMIA: ICD-10-CM

## 2021-06-02 LAB
ESTIMATED AVG GLUCOSE: 137 MG/DL (ref 68–131)
HBA1C MFR BLD: 6.4 % (ref 4.5–6.2)

## 2021-06-02 PROCEDURE — 99213 PR OFFICE/OUTPT VISIT, EST, LEVL III, 20-29 MIN: ICD-10-PCS | Mod: S$PBB,,, | Performed by: INTERNAL MEDICINE

## 2021-06-02 PROCEDURE — 99213 OFFICE O/P EST LOW 20 MIN: CPT | Performed by: INTERNAL MEDICINE

## 2021-06-02 PROCEDURE — 99213 OFFICE O/P EST LOW 20 MIN: CPT | Mod: S$PBB,,, | Performed by: INTERNAL MEDICINE

## 2021-06-02 PROCEDURE — 83036 HEMOGLOBIN GLYCOSYLATED A1C: CPT | Performed by: INTERNAL MEDICINE

## 2021-06-02 PROCEDURE — 36415 COLL VENOUS BLD VENIPUNCTURE: CPT | Performed by: INTERNAL MEDICINE

## 2021-06-02 RX ORDER — NIFEDIPINE 60 MG/1
60 TABLET, EXTENDED RELEASE ORAL DAILY
Qty: 90 TABLET | Refills: 3 | Status: SHIPPED | OUTPATIENT
Start: 2021-06-02 | End: 2021-12-21 | Stop reason: SDUPTHER

## 2021-06-02 RX ORDER — NIFEDIPINE 60 MG/1
60 TABLET, EXTENDED RELEASE ORAL DAILY
Qty: 90 TABLET | Refills: 3 | Status: SHIPPED | OUTPATIENT
Start: 2021-06-02 | End: 2021-06-02 | Stop reason: SDUPTHER

## 2021-06-08 ENCOUNTER — PATIENT MESSAGE (OUTPATIENT)
Dept: FAMILY MEDICINE | Facility: CLINIC | Age: 66
End: 2021-06-08

## 2021-10-02 ENCOUNTER — PATIENT MESSAGE (OUTPATIENT)
Dept: FAMILY MEDICINE | Facility: CLINIC | Age: 66
End: 2021-10-02

## 2021-12-02 ENCOUNTER — TELEPHONE (OUTPATIENT)
Dept: FAMILY MEDICINE | Facility: CLINIC | Age: 66
End: 2021-12-02
Payer: MEDICARE

## 2021-12-06 ENCOUNTER — PATIENT MESSAGE (OUTPATIENT)
Dept: FAMILY MEDICINE | Facility: CLINIC | Age: 66
End: 2021-12-06
Payer: MEDICARE

## 2021-12-21 ENCOUNTER — OFFICE VISIT (OUTPATIENT)
Dept: FAMILY MEDICINE | Facility: CLINIC | Age: 66
End: 2021-12-21
Payer: MEDICARE

## 2021-12-21 VITALS
HEART RATE: 62 BPM | SYSTOLIC BLOOD PRESSURE: 134 MMHG | HEIGHT: 72 IN | BODY MASS INDEX: 27.63 KG/M2 | WEIGHT: 204 LBS | DIASTOLIC BLOOD PRESSURE: 75 MMHG

## 2021-12-21 DIAGNOSIS — R73.01 IMPAIRED FASTING GLUCOSE: ICD-10-CM

## 2021-12-21 DIAGNOSIS — E78.00 HYPERCHOLESTEROLEMIA: ICD-10-CM

## 2021-12-21 DIAGNOSIS — I10 BENIGN ESSENTIAL HYPERTENSION: Primary | ICD-10-CM

## 2021-12-21 DIAGNOSIS — E79.0 HYPERURICEMIA: ICD-10-CM

## 2021-12-21 PROCEDURE — 99213 PR OFFICE/OUTPT VISIT, EST, LEVL III, 20-29 MIN: ICD-10-PCS | Mod: S$PBB,,, | Performed by: INTERNAL MEDICINE

## 2021-12-21 PROCEDURE — 99213 OFFICE O/P EST LOW 20 MIN: CPT | Mod: S$PBB,,, | Performed by: INTERNAL MEDICINE

## 2021-12-21 PROCEDURE — 99213 OFFICE O/P EST LOW 20 MIN: CPT | Performed by: INTERNAL MEDICINE

## 2021-12-21 RX ORDER — ATORVASTATIN CALCIUM 10 MG/1
10 TABLET, FILM COATED ORAL DAILY
Qty: 90 TABLET | Refills: 3 | Status: SHIPPED | OUTPATIENT
Start: 2021-12-21 | End: 2022-12-08 | Stop reason: SDUPTHER

## 2021-12-21 RX ORDER — NIFEDIPINE 60 MG/1
60 TABLET, EXTENDED RELEASE ORAL DAILY
Qty: 90 TABLET | Refills: 3 | Status: SHIPPED | OUTPATIENT
Start: 2021-12-21 | End: 2022-12-08 | Stop reason: SDUPTHER

## 2022-07-21 ENCOUNTER — OFFICE VISIT (OUTPATIENT)
Dept: FAMILY MEDICINE | Facility: CLINIC | Age: 67
End: 2022-07-21
Payer: MEDICARE

## 2022-07-21 VITALS
BODY MASS INDEX: 27.59 KG/M2 | HEART RATE: 84 BPM | OXYGEN SATURATION: 97 % | HEIGHT: 72 IN | DIASTOLIC BLOOD PRESSURE: 74 MMHG | WEIGHT: 203.69 LBS | SYSTOLIC BLOOD PRESSURE: 126 MMHG

## 2022-07-21 DIAGNOSIS — N52.8 OTHER MALE ERECTILE DYSFUNCTION: Chronic | ICD-10-CM

## 2022-07-21 DIAGNOSIS — J30.1 NON-SEASONAL ALLERGIC RHINITIS DUE TO POLLEN: ICD-10-CM

## 2022-07-21 DIAGNOSIS — I10 BENIGN ESSENTIAL HYPERTENSION: Primary | ICD-10-CM

## 2022-07-21 DIAGNOSIS — E78.00 HYPERCHOLESTEROLEMIA: Chronic | ICD-10-CM

## 2022-07-21 DIAGNOSIS — R73.01 IMPAIRED FASTING GLUCOSE: ICD-10-CM

## 2022-07-21 DIAGNOSIS — E79.0 HYPERURICEMIA: ICD-10-CM

## 2022-07-21 DIAGNOSIS — I73.9 PERIPHERAL ARTERIAL DISEASE: ICD-10-CM

## 2022-07-21 PROCEDURE — 99213 OFFICE O/P EST LOW 20 MIN: CPT | Mod: S$PBB,AQ,, | Performed by: INTERNAL MEDICINE

## 2022-07-21 PROCEDURE — 99215 OFFICE O/P EST HI 40 MIN: CPT | Performed by: INTERNAL MEDICINE

## 2022-07-21 PROCEDURE — 99213 PR OFFICE/OUTPT VISIT, EST, LEVL III, 20-29 MIN: ICD-10-PCS | Mod: S$PBB,AQ,, | Performed by: INTERNAL MEDICINE

## 2022-07-21 RX ORDER — SOLIFENACIN SUCCINATE 10 MG/1
10 TABLET, FILM COATED ORAL DAILY
COMMUNITY
Start: 2022-06-30 | End: 2023-01-24

## 2022-07-21 RX ORDER — TADALAFIL 20 MG/1
20 TABLET ORAL DAILY
COMMUNITY
Start: 2022-06-30

## 2022-07-21 RX ORDER — LIDOCAINE 50 MG/G
1 PATCH TOPICAL DAILY
COMMUNITY
Start: 2022-04-27

## 2022-07-21 RX ORDER — CILOSTAZOL 100 MG/1
100 TABLET ORAL 2 TIMES DAILY
COMMUNITY
Start: 2022-05-17

## 2022-07-21 RX ORDER — ROSUVASTATIN CALCIUM 20 MG/1
20 TABLET, COATED ORAL NIGHTLY
COMMUNITY
Start: 2022-05-17 | End: 2023-01-24 | Stop reason: ALTCHOICE

## 2022-07-21 NOTE — PROGRESS NOTES
"Subjective:       Patient ID: Cipriano Gunderson is a 67 y.o. male.    Chief Complaint: Follow-up, Hypertension, Hyperlipidemia, Impaired Fasting Glucose, Erectile Dysfunction, and Sinus Problem    1. Benign essential hypertension   2. Hypercholesterolemia   3. Hyperuricemia   4. Impaired fasting glucose   5. Other male erectile dysfunction   6. Non-seasonal allergic rhinitis due to pollen     [facility] and [facility] were [facility] in April 22. WBC 10.7 RBC 5.15 hemoglobin 16.1, hematocrit 47.3, MCV 91.7, platelets 270. PSA 0.33    Recent medical issues reviewed include perhaps diagnosis of mild peripheral vascular disease based upon symptoms of claudication.  Patient has been prescribed recall.  Details of those encounters are not known to me.    [facility] and have been reviewed as above.  I have advised him to forward me a copy of the labs through the portal.  I do not see a hemoglobin A1c.  PSA test was normal.    His exercise tolerance is fairly good.    Erectile dysfunction has been noted with some degree of relief from use of Cialis.  He asks  for "injectable form of Cialis."  I have advised him there is no injectable form of Cialis.  I am not sure if he is talking about  testosterone.  This would not be a good time to take testosterone without proper workup especially during covid 19 times with riask of infections and thrombogenic risks.      Hypertension  This is a chronic problem. The current episode started more than 1 year ago. The problem is controlled. Pertinent negatives include no chest pain, headaches, neck pain or palpitations. Risk factors for coronary artery disease include male gender and dyslipidemia. Past treatments include ACE inhibitors. The current treatment provides moderate improvement. Compliance problems include psychosocial issues.    Hyperlipidemia  This is a chronic problem. The current episode started more than 1 year ago. The problem is controlled. He " has no history of obesity. Pertinent negatives include no chest pain. Current antihyperlipidemic treatment includes statins. The current treatment provides moderate improvement of lipids. Compliance problems include psychosocial issues.  Risk factors for coronary artery disease include hypertension, male sex and dyslipidemia.   Erectile Dysfunction  This is a chronic problem. The current episode started more than 1 year ago. The problem has been waxing and waning since onset. The nature of his difficulty is achieving erection, maintaining erection and penetration. Non-physiologic factors contributing to erectile dysfunction are anxiety. He reports no decreased libido or performance anxiety. Irritative symptoms do not include urgency. Pertinent negatives include no chills or hematuria. Past treatments include tadalafil. Improvement on treatment: equivocal - wants injection ?????? Testosterone. He has had no adverse reactions caused by medications. Risk factors include hypertension.   Sinus Problem  This is a chronic problem. The current episode started more than 1 year ago. The problem has been waxing and waning since onset. There has been no fever. Pertinent negatives include no chills, headaches or neck pain. Treatments tried: Zyrtec and Atrovent. The treatment provided moderate relief.       Past Medical History:   Diagnosis Date    Depression     Diabetes mellitus, type 2     GERD (gastroesophageal reflux disease)     Hyperlipidemia     Hypertension      Social History     Socioeconomic History    Marital status:      Spouse name: Julieta    Number of children: 2   Occupational History    Occupation: [employer]   Tobacco Use    Smoking status: Never Smoker    Smokeless tobacco: Never Used   Substance and Sexual Activity    Alcohol use: Yes     Alcohol/week: 1.0 - 2.0 standard drink     Types: 1 - 2 Glasses of wine per week     Comment: weekends    Drug use: No    Sexual activity: Yes      Partners: Female     Social Determinants of Health     Financial Resource Strain: Low Risk     Difficulty of Paying Living Expenses: Not hard at all   Food Insecurity: No Food Insecurity    Worried About Running Out of Food in the Last Year: Never true    Ran Out of Food in the Last Year: Never true   Transportation Needs: No Transportation Needs    Lack of Transportation (Medical): No    Lack of Transportation (Non-Medical): No   Physical Activity: Sufficiently Active    Days of Exercise per Week: 3 days    Minutes of Exercise per Session: 90 min   Stress: No Stress Concern Present    Feeling of Stress : Not at all   Social Connections: Unknown    Frequency of Communication with Friends and Family: More than three times a week    Frequency of Social Gatherings with Friends and Family: Once a week    Active Member of Clubs or Organizations: Yes    Attends Club or Organization Meetings: Patient refused    Marital Status:    Housing Stability: Low Risk     Unable to Pay for Housing in the Last Year: No    Number of Places Lived in the Last Year: 1    Unstable Housing in the Last Year: No     Past Surgical History:   Procedure Laterality Date    DENTAL SURGERY  04/05/2019 5/24/2019    HERNIA REPAIR      NASAL SEPTUM SURGERY       Family History   Problem Relation Age of Onset    Hypertension Mother     Cancer Mother         ??    Hypertension Father     Heart disease Father     Heart failure Father        Review of Systems   Constitutional: Negative for activity change, chills and unexpected weight change (lost 1 lb).   HENT: Positive for hearing loss. Negative for rhinorrhea and trouble swallowing.    Eyes: Negative for discharge, redness and visual disturbance.   Respiratory: Negative for chest tightness and wheezing.    Cardiovascular: Negative for chest pain and palpitations.   Gastrointestinal: Negative for abdominal distention, blood in stool, constipation and diarrhea.    Endocrine: Negative for polydipsia and polyuria.   Genitourinary: Negative for decreased libido, difficulty urinating, hematuria and urgency.   Musculoskeletal: Negative for neck pain.   Neurological: Negative for headaches.   Psychiatric/Behavioral: Negative for confusion and dysphoric mood. The patient is nervous/anxious.          Objective:      Blood pressure 126/74, pulse 84, height 6' (1.829 m), weight 92.4 kg (203 lb 11.2 oz), SpO2 97 %. Body mass index is 27.63 kg/m².  Physical Exam  Constitutional:       General: He is not in acute distress.     Appearance: Normal appearance. He is not ill-appearing, toxic-appearing or diaphoretic.      Comments: BMI is 27.63.  Lost 1 lb since the last visit and 13 lb prior to that.   Eyes:      General: No scleral icterus.  Cardiovascular:      Rate and Rhythm: Normal rate and regular rhythm. Occasional extrasystoles are present.     Comments: Occasional ectopics are felt in his pulse.  Pulmonary:      Effort: Pulmonary effort is normal.      Breath sounds: Normal breath sounds.   Abdominal:      General: Abdomen is flat.   Musculoskeletal:      Right lower leg: No edema.      Left lower leg: No edema.   Skin:     Findings: No lesion or rash.   Neurological:      Mental Status: Mental status is at baseline.   Psychiatric:      Comments: terse           Assessment:       1. Benign essential hypertension    2. Hypercholesterolemia    3. Hyperuricemia    4. Impaired fasting glucose    5. Other male erectile dysfunction    6. Non-seasonal allergic rhinitis due to pollen    7. Peripheral arterial disease           No visits with results within 3 Month(s) from this visit.   Latest known visit with results is:   Lab Visit on 06/02/2021   Component Date Value Ref Range Status    Hemoglobin A1C 06/02/2021 6.4 (A) 4.5 - 6.2 % Final    Estimated Avg Glucose 06/02/2021 137 (A) 68 - 131 mg/dL Final       Alkaline phosphatase 93, SGOT 47, SGPT 39, albumin 4.4, direct bilirubin less  than 0.2, protein 7.0, total bilirubin 0.9, triglycerides 94, LDL 55.8, total cholesterol 129, potassium 4.4, creatinine 0.9, calcium 9.6, chloride 102, sodium 135 and carbon dioxide 24.    TSH is 1.90.  GFR is greater than 90.   Plan:           Benign essential hypertension  Comments:  Blood pressures are stable at this point.  Orders:  -     Comprehensive Metabolic Panel; Future; Expected date: 12/05/2022  -     Microalbumin/Creatinine Ratio, Urine; Future; Expected date: 12/05/2022    Hypercholesterolemia  Comments:  Recent lipid panel done at the if he is within acceptable range.  No abnormalities noted in the liver tests and he does not complain of any muscle aches.  Orders:  -     Comprehensive Metabolic Panel; Future; Expected date: 12/05/2022  -     Lipid Panel; Future; Expected date: 12/05/2022    Hyperuricemia  Comments:  No recent attack of gout.  He continues on allopurinol.  Orders:  -     Uric Acid; Future; Expected date: 12/05/2022    Impaired fasting glucose  Comments:  Hemoglobin A1c was slightly elevated.  Perhaps 6.1 or 6.2 and patient will [facility].  Orders:  -     Hemoglobin A1C; Future; Expected date: 12/05/2022    Other male erectile dysfunction  Comments:  Cialis as needed.  No indication for testosterone at this point.    Non-seasonal allergic rhinitis due to pollen    Peripheral arterial disease  Comments:  Patient has been put on feet all 100 mg twice a day to help with some of the claudication pains.  This was done by her cardiologist.      [facility].  Blood counts are good.  PSA is normal.    Overall he Mr. Cota is doing okay.  He does exercise by bicycling in the morning but due to the weather and climate he sweats a lot.  He is doing spin cycle at a local athletic Club where it is air-conditioned and more comfortable.    I have reviewed some of [facility] and they are in acceptable range.  I am looking for  the hemoglobin A1c.    Overall he seems to be doing okay.  He is taking his medications for blood pressure and cholesterol.    He uses sinus sprays.  Ibuprofen perhaps as needed.  Allopurinol he is taking to prevent gout.    I need to know the status of pneumonia vaccine also.    He is updated on colon cancer screening till 2014. He also had a tetanus vaccination in 2014.    He states that he got a update on pneumonia vaccine and I would like to have a proof of that.     fup follow-up in 6 months.  Try to see if [facility] can [facility].  Flu shot recommended.  Continue with COVID precautions.      Current Outpatient Medications:     allopurinoL (ZYLOPRIM) 100 MG tablet, , Disp: , Rfl:     aspirin (ECOTRIN) 81 MG EC tablet, Take 1 tablet by mouth Daily., Disp: , Rfl:     atorvastatin (LIPITOR) 10 MG tablet, Take 1 tablet (10 mg total) by mouth once daily., Disp: 90 tablet, Rfl: 3    cetirizine (ZYRTEC) 10 MG tablet, Take 10 mg by mouth once daily., Disp: , Rfl:     cilostazoL (PLETAL) 100 MG Tab, Take 100 mg by mouth 2 (two) times daily., Disp: , Rfl:     diclofenac sodium (VOLTAREN) 1 % Gel, Apply 2 g topically once daily., Disp: 100 g, Rfl: 0    fluticasone propionate (FLONASE) 50 mcg/actuation nasal spray, , Disp: , Rfl:     hydrocortisone valerate (WEST-KIMANI) 0.2 % ointment, 1 Dose once daily., Disp: , Rfl:     ibuprofen (ADVIL,MOTRIN) 800 MG tablet, TAKE 1 TABLET THREE TIMES A DAY AS NEEDED FOR PAIN AND JOINT PAIN, Disp: 90 tablet, Rfl: 11    ipratropium (ATROVENT) 0.03 % nasal spray, USE 2 SPRAYS NASALLY TWICE A DAY, Disp: 90 mL, Rfl: 4    LIDOcaine (LIDODERM) 5 %, 1 patch once daily., Disp: , Rfl:     lisinopril (PRINIVIL,ZESTRIL) 40 MG tablet, Take 40 mg by mouth once daily., Disp: , Rfl:     meloxicam (MOBIC) 15 MG tablet, Take 1 tablet (15 mg total) by mouth once daily., Disp: 10 tablet, Rfl: 1    metFORMIN (GLUCOPHAGE) 500 MG tablet, Take 1 tablet (500 mg total) by mouth once daily., Disp:  90 tablet, Rfl: 4    NIFEdipine (ADALAT CC) 60 MG TbSR, Take 1 tablet (60 mg total) by mouth once daily., Disp: 90 tablet, Rfl: 3    rosuvastatin (CRESTOR) 20 MG tablet, Take 20 mg by mouth nightly., Disp: , Rfl:     solifenacin (VESICARE) 10 MG tablet, Take 10 mg by mouth once daily., Disp: , Rfl:     SYSTANE ULTRA, PF, 0.4-0.3 % Dpet, , Disp: , Rfl:     tadalafiL (CIALIS) 20 MG Tab, Take by mouth., Disp: , Rfl:     traZODone (DESYREL) 50 MG tablet, 1 tablet daily as needed., Disp: , Rfl:

## 2022-07-22 ENCOUNTER — PATIENT MESSAGE (OUTPATIENT)
Dept: FAMILY MEDICINE | Facility: CLINIC | Age: 67
End: 2022-07-22

## 2023-01-13 ENCOUNTER — PATIENT MESSAGE (OUTPATIENT)
Dept: FAMILY MEDICINE | Facility: CLINIC | Age: 68
End: 2023-01-13

## 2023-01-13 DIAGNOSIS — Z12.5 SCREENING FOR PROSTATE CANCER: Primary | ICD-10-CM

## 2023-01-18 ENCOUNTER — LAB VISIT (OUTPATIENT)
Dept: LAB | Facility: HOSPITAL | Age: 68
End: 2023-01-18
Attending: INTERNAL MEDICINE
Payer: MEDICARE

## 2023-01-18 DIAGNOSIS — R73.01 IMPAIRED FASTING GLUCOSE: ICD-10-CM

## 2023-01-18 DIAGNOSIS — I10 BENIGN ESSENTIAL HYPERTENSION: ICD-10-CM

## 2023-01-18 DIAGNOSIS — E79.0 HYPERURICEMIA: ICD-10-CM

## 2023-01-18 DIAGNOSIS — E78.00 HYPERCHOLESTEROLEMIA: Chronic | ICD-10-CM

## 2023-01-18 LAB
ALBUMIN SERPL BCP-MCNC: 4.5 G/DL (ref 3.5–5.2)
ALBUMIN/CREAT UR: 10 UG/MG (ref 0–30)
ALP SERPL-CCNC: 98 U/L (ref 55–135)
ALT SERPL W/O P-5'-P-CCNC: 24 U/L (ref 10–44)
ANION GAP SERPL CALC-SCNC: 9 MMOL/L (ref 8–16)
AST SERPL-CCNC: 28 U/L (ref 10–40)
BILIRUB SERPL-MCNC: 0.6 MG/DL (ref 0.1–1)
BUN SERPL-MCNC: 11 MG/DL (ref 8–23)
CALCIUM SERPL-MCNC: 9.7 MG/DL (ref 8.7–10.5)
CHLORIDE SERPL-SCNC: 105 MMOL/L (ref 95–110)
CHOLEST SERPL-MCNC: 128 MG/DL (ref 120–199)
CHOLEST/HDLC SERPL: 2.4 {RATIO} (ref 2–5)
CO2 SERPL-SCNC: 24 MMOL/L (ref 23–29)
CREAT SERPL-MCNC: 1 MG/DL (ref 0.5–1.4)
CREAT UR-MCNC: 221 MG/DL (ref 23–375)
EST. GFR  (NO RACE VARIABLE): >60 ML/MIN/1.73 M^2
GLUCOSE SERPL-MCNC: 117 MG/DL (ref 70–110)
HDLC SERPL-MCNC: 54 MG/DL (ref 40–75)
HDLC SERPL: 42.2 % (ref 20–50)
LDLC SERPL CALC-MCNC: 61.8 MG/DL (ref 63–159)
MICROALBUMIN UR DL<=1MG/L-MCNC: 22.1 UG/ML
NONHDLC SERPL-MCNC: 74 MG/DL
POTASSIUM SERPL-SCNC: 3.9 MMOL/L (ref 3.5–5.1)
PROT SERPL-MCNC: 7.4 G/DL (ref 6–8.4)
SODIUM SERPL-SCNC: 138 MMOL/L (ref 136–145)
TRIGL SERPL-MCNC: 61 MG/DL (ref 30–150)
URATE SERPL-MCNC: 6.7 MG/DL (ref 3.4–7)

## 2023-01-18 PROCEDURE — 36415 COLL VENOUS BLD VENIPUNCTURE: CPT | Performed by: INTERNAL MEDICINE

## 2023-01-18 PROCEDURE — 80053 COMPREHEN METABOLIC PANEL: CPT | Performed by: INTERNAL MEDICINE

## 2023-01-18 PROCEDURE — 84550 ASSAY OF BLOOD/URIC ACID: CPT | Performed by: INTERNAL MEDICINE

## 2023-01-18 PROCEDURE — 83036 HEMOGLOBIN GLYCOSYLATED A1C: CPT | Performed by: INTERNAL MEDICINE

## 2023-01-18 PROCEDURE — 80061 LIPID PANEL: CPT | Performed by: INTERNAL MEDICINE

## 2023-01-18 PROCEDURE — 82570 ASSAY OF URINE CREATININE: CPT | Performed by: INTERNAL MEDICINE

## 2023-01-21 LAB
ESTIMATED AVG GLUCOSE: 126 MG/DL (ref 68–131)
HBA1C MFR BLD: 6 % (ref 4.5–6.2)

## 2023-01-21 NOTE — PROGRESS NOTES
Subjective:       Patient ID: Cipriano Gunderson is a 67 y.o. male.    Chief Complaint: Hyperlipidemia, Diabetes, Follow-up, Gout, and Erectile Dysfunction    Patient is a 67-year-old male who comes for follow-up.    1. Benign essential hypertension   2. Hypercholesterolemia   3. Hyperuricemia   4. Impaired fasting glucose   5. Other male erectile dysfunction   6. Non-seasonal allergic rhinitis due to pollen :-on Flonase and ipratropium  7.         Prescription for cilostazol noted for peripheral vascular disease.  This was based upon testing.  Not sure how his symptoms are.       Patient's recent labs show improved hemoglobin A1c though fasting sugar is 117.    Lipid panel is improved.      PSA test has been ordered recently and will be pending.  PSA test is also within normal range.    He recently had a fit test done which was negative.    Pneumococcal 23 vaccine has been mentioned in 2017 with no proof in our system.  He has been offered Prevnar 20 vaccine.    Allopurinol for gout prevention has been noted.      Baseline baby aspirin has been noted.      Cetirizine/Flonase/Atrovent nasal spray for sinuses.      Ibuprofen 800 mg for arthritis pains.      He continues to exercise and weight strength regularly.      //////////////////////      Past medical issues reviewed include perhaps diagnosis of mild peripheral vascular disease based upon symptoms of claudication.  Patient has been prescribed pletal.  Details of those encounters are not known to me.  Uncertain if it really helps him.  His exercise tolerance based upon his bicycling and exercise is good regardless.      Erectile dysfunction has been noted with some degree of relief from use of Cialis.  In past he had asked for a injectable form of Cialis which I assume he was asking for testosterone.  I do not believe currently during prevalent COVID-19 issues and risk for thrombogenic events if 1 has to have infection, this would be a good idea.  Will address this  issue in future.    Hypertension  This is a chronic problem. The current episode started more than 1 year ago. The problem is controlled. Pertinent negatives include no chest pain, headaches, neck pain, palpitations or shortness of breath. Risk factors for coronary artery disease include male gender and dyslipidemia. Past treatments include ACE inhibitors. The current treatment provides moderate improvement. Compliance problems include psychosocial issues.    Hyperlipidemia  This is a chronic problem. The current episode started more than 1 year ago. The problem is controlled. He has no history of obesity. Pertinent negatives include no chest pain or shortness of breath. Current antihyperlipidemic treatment includes statins. The current treatment provides moderate improvement of lipids. Compliance problems include psychosocial issues.  Risk factors for coronary artery disease include hypertension, male sex and dyslipidemia.   Erectile Dysfunction  This is a chronic problem. The current episode started more than 1 year ago. The problem has been waxing and waning since onset. The nature of his difficulty is achieving erection, maintaining erection and penetration. He reports no anxiety, decreased libido or performance anxiety. Irritative symptoms do not include urgency. Pertinent negatives include no chills or hematuria. Past treatments include tadalafil. Improvement on treatment: equivocal - wants injection ?????? Testosterone. He has had no adverse reactions caused by medications. Risk factors include hypertension.   Sinus Problem  This is a chronic problem. The current episode started more than 1 year ago. The problem has been waxing and waning since onset. There has been no fever. Pertinent negatives include no chills, headaches, neck pain or shortness of breath. Treatments tried: Zyrtec and Atrovent. The treatment provided moderate relief.     Past Medical History:   Diagnosis Date    Depression     Diabetes  mellitus, type 2     GERD (gastroesophageal reflux disease)     Hyperlipidemia     Hypertension      Social History     Socioeconomic History    Marital status:      Spouse name: Julieta    Number of children: 2   Occupational History    Occupation: [employer]   Tobacco Use    Smoking status: Never    Smokeless tobacco: Never   Substance and Sexual Activity    Alcohol use: Yes     Alcohol/week: 1.0 - 2.0 standard drink     Types: 1 - 2 Glasses of wine per week     Comment: weekends    Drug use: No    Sexual activity: Yes     Partners: Female     Social Determinants of Health     Financial Resource Strain: Low Risk     Difficulty of Paying Living Expenses: Not hard at all   Food Insecurity: No Food Insecurity    Worried About Running Out of Food in the Last Year: Never true    Ran Out of Food in the Last Year: Never true   Transportation Needs: No Transportation Needs    Lack of Transportation (Medical): No    Lack of Transportation (Non-Medical): No   Physical Activity: Sufficiently Active    Days of Exercise per Week: 3 days    Minutes of Exercise per Session: 90 min   Stress: No Stress Concern Present    Feeling of Stress : Not at all   Social Connections: Unknown    Frequency of Communication with Friends and Family: More than three times a week    Frequency of Social Gatherings with Friends and Family: Twice a week    Active Member of Clubs or Organizations: Yes    Attends Club or Organization Meetings: More than 4 times per year    Marital Status:    Housing Stability: Low Risk     Unable to Pay for Housing in the Last Year: No    Number of Places Lived in the Last Year: 1    Unstable Housing in the Last Year: No     Past Surgical History:   Procedure Laterality Date    DENTAL SURGERY  04/05/2019 5/24/2019    HERNIA REPAIR      NASAL SEPTUM SURGERY       Family History   Problem Relation Age of Onset    Hypertension Mother     Cancer Mother         ??    Hypertension Father     Heart disease  Father     Heart failure Father        Review of Systems   Constitutional:  Negative for activity change, chills, fever and unexpected weight change (Gained 13 lb of weight.  Mostly muscles and working gym.).   HENT:  Negative for hearing loss, rhinorrhea and trouble swallowing.    Eyes:  Negative for discharge, redness and visual disturbance.   Respiratory:  Negative for chest tightness, shortness of breath and wheezing.    Cardiovascular:  Negative for chest pain and palpitations.   Gastrointestinal:  Negative for blood in stool, constipation, diarrhea and vomiting.   Endocrine: Negative for polydipsia and polyuria.   Genitourinary:  Negative for decreased libido, difficulty urinating, hematuria and urgency.   Musculoskeletal:  Negative for arthralgias, joint swelling and neck pain.   Neurological:  Negative for weakness and headaches.   Psychiatric/Behavioral:  Negative for confusion and dysphoric mood. The patient is not nervous/anxious.         A certain rigidity and compulsiveness in his behavior.       Objective:      Blood pressure 128/77, pulse 78, height 6' (1.829 m), weight 98 kg (216 lb). Body mass index is 29.29 kg/m².  Physical Exam  Constitutional:       General: He is not in acute distress.     Appearance: Normal appearance. He is not ill-appearing, toxic-appearing or diaphoretic.      Comments: BMI is 29.29 gained 13 lb of weight since the last documentation.  Attributes mostly to muscular gained.   Eyes:      General: No scleral icterus.  Cardiovascular:      Rate and Rhythm: Normal rate. Rhythm irregular. Occasional Extrasystoles are present.     Comments: Occasional ectopics are felt in his pulse.  Pulmonary:      Effort: Pulmonary effort is normal.      Breath sounds: Normal breath sounds.   Abdominal:      General: There is no distension.      Tenderness: There is no abdominal tenderness.   Musculoskeletal:      Right lower leg: No edema.      Left lower leg: No edema.   Skin:     Findings: No  lesion or rash.   Neurological:      Mental Status: Mental status is at baseline.   Psychiatric:      Comments: terse         Assessment:       1. Benign essential hypertension    2. Hyperuricemia    3. Impaired fasting glucose    4. Other male erectile dysfunction    5. Screening for prostate cancer    6. Ventricular ectopics           Lab Visit on 01/23/2023   Component Date Value Ref Range Status    PSA, Screen 01/23/2023 0.36  0.00 - 4.00 ng/mL Final   Lab Visit on 01/18/2023   Component Date Value Ref Range Status    Hemoglobin A1C 01/18/2023 6.0  4.5 - 6.2 % Final    Estimated Avg Glucose 01/18/2023 126  68 - 131 mg/dL Final    Sodium 01/18/2023 138  136 - 145 mmol/L Final    Potassium 01/18/2023 3.9  3.5 - 5.1 mmol/L Final    Chloride 01/18/2023 105  95 - 110 mmol/L Final    CO2 01/18/2023 24  23 - 29 mmol/L Final    Glucose 01/18/2023 117 (H)  70 - 110 mg/dL Final    BUN 01/18/2023 11  8 - 23 mg/dL Final    Creatinine 01/18/2023 1.0  0.5 - 1.4 mg/dL Final    Calcium 01/18/2023 9.7  8.7 - 10.5 mg/dL Final    Total Protein 01/18/2023 7.4  6.0 - 8.4 g/dL Final    Albumin 01/18/2023 4.5  3.5 - 5.2 g/dL Final    Total Bilirubin 01/18/2023 0.6  0.1 - 1.0 mg/dL Final    Alkaline Phosphatase 01/18/2023 98  55 - 135 U/L Final    AST 01/18/2023 28  10 - 40 U/L Final    ALT 01/18/2023 24  10 - 44 U/L Final    Anion Gap 01/18/2023 9  8 - 16 mmol/L Final    eGFR 01/18/2023 >60.0  >60 mL/min/1.73 m^2 Final    Cholesterol 01/18/2023 128  120 - 199 mg/dL Final    Triglycerides 01/18/2023 61  30 - 150 mg/dL Final    HDL 01/18/2023 54  40 - 75 mg/dL Final    LDL Cholesterol 01/18/2023 61.8 (L)  63.0 - 159.0 mg/dL Final    HDL/Cholesterol Ratio 01/18/2023 42.2  20.0 - 50.0 % Final    Total Cholesterol/HDL Ratio 01/18/2023 2.4  2.0 - 5.0 Final    Non-HDL Cholesterol 01/18/2023 74  mg/dL Final    Microalbumin, Urine 01/18/2023 22.1 (H)  <19.9 ug/mL Final    Creatinine, Urine 01/18/2023 221.0  23.0 - 375.0 mg/dL Final     Microalb/Creat Ratio 01/18/2023 10.0  0.0 - 30.0 ug/mg Final    Uric Acid 01/18/2023 6.7  3.4 - 7.0 mg/dL Final      Ref Range & Units 3 d ago   (1/18/23) 2 yr ago   (9/28/20) 2 yr ago      Glucose 70 - 110 mg/dL 117 High   100  134 High       Hemoglobin A1C 4.5 - 6.2 % 6.0  6.4 High  CM  6.0 CM  5.9          Plan:           Benign essential hypertension  -     lisinopriL (PRINIVIL,ZESTRIL) 20 MG tablet; Take 1 tablet (20 mg total) by mouth once daily. Takes half a pill only.  Dispense: 90 tablet; Refill: 4    Hyperuricemia    Impaired fasting glucose    Other male erectile dysfunction    Screening for prostate cancer  Comments:  Lab has been sent     Ventricular ectopics  -     EKG 12-lead; Future; Expected date: 01/24/2023      Generally Cipriano is doing okay.  His exercise tolerance is excellent as he rides his bike for more than a or on the family trace.  He also does elliptical in gym.  No chest pain or shortness of breath.      Today I again detected some irregular pulse.  I am going to do an EKG again to be sure.  Previous EKG had shown some left axis deviation and intraventricular conduction defect.    He recalls that in 2014 he had a stress test    He is updated on the flu vaccine for this season.  He did get a pneumonia vaccine pneumococcal 23 in 2017.  He does state that [facility] and I will review that before I offer him.  He is updated on Tdap vaccination in 2014 and the next 1 will be due next year.  Otherwise if all goes well I will see him back in about 4-6 months time for follow-up.  Subject to the EKG not showing any major issues.    If the EKG shows any significant abnormalities he would like to prefer the civilian cardiologist because of time delay in [facility].    EKG has been reviewed.  There seems to be further changes.  Atrial activity is detected.  Multiple ectopics and right bundle-branch block pattern.  Patient will be  notified.  Cardiology consultation will be made.    He did ask me if he needs to continue on metformin.  His blood sugars are slightly elevated.  He exercises regularly.  Stopping the metformin may probably raise his blood sugar slightly only    Spent jeannette 36 minutes with patient which involved review of pts medical conditions, labs, medications and with 50% of time face-to-face discussion about medical problems, management and any applicable changes.      Current Outpatient Medications:     allopurinoL (ZYLOPRIM) 100 MG tablet, , Disp: , Rfl:     aspirin (ECOTRIN) 81 MG EC tablet, Take 1 tablet by mouth Daily., Disp: , Rfl:     atorvastatin (LIPITOR) 10 MG tablet, TAKE 1 TABLET(10 MG) BY MOUTH EVERY DAY, Disp: 90 tablet, Rfl: 3    cetirizine (ZYRTEC) 10 MG tablet, Take 10 mg by mouth once daily., Disp: , Rfl:     cilostazoL (PLETAL) 100 MG Tab, Take 100 mg by mouth 2 (two) times daily., Disp: , Rfl:     diclofenac sodium (VOLTAREN) 1 % Gel, Apply 2 g topically once daily., Disp: 100 g, Rfl: 0    fluticasone propionate (FLONASE) 50 mcg/actuation nasal spray, , Disp: , Rfl:     hydrocortisone valerate (WEST-KIMANI) 0.2 % ointment, 1 Dose once daily., Disp: , Rfl:     ibuprofen (ADVIL,MOTRIN) 800 MG tablet, TAKE 1 TABLET THREE TIMES A DAY AS NEEDED FOR PAIN AND JOINT PAIN, Disp: 90 tablet, Rfl: 11    ipratropium (ATROVENT) 0.03 % nasal spray, USE 2 SPRAYS NASALLY TWICE A DAY, Disp: 90 mL, Rfl: 4    LIDOcaine (LIDODERM) 5 %, 1 patch once daily., Disp: , Rfl:     metFORMIN (GLUCOPHAGE) 500 MG tablet, Take 1 tablet (500 mg total) by mouth once daily., Disp: 90 tablet, Rfl: 4    NIFEdipine (ADALAT CC) 60 MG TbSR, TAKE 1 TABLET BY MOUTH DAILY, Disp: 90 tablet, Rfl: 3    SYSTANE ULTRA, PF, 0.4-0.3 % Dpet, , Disp: , Rfl:     tadalafiL (CIALIS) 20 MG Tab, Take by mouth., Disp: , Rfl:     traZODone (DESYREL) 50 MG tablet, 1 tablet daily as needed., Disp: , Rfl:     lisinopriL (PRINIVIL,ZESTRIL) 20 MG tablet, Take 1 tablet (20 mg  total) by mouth once daily. Takes half a pill only., Disp: 90 tablet, Rfl: 4

## 2023-01-23 ENCOUNTER — LAB VISIT (OUTPATIENT)
Dept: LAB | Facility: HOSPITAL | Age: 68
End: 2023-01-23
Attending: INTERNAL MEDICINE
Payer: MEDICARE

## 2023-01-23 DIAGNOSIS — Z12.5 SCREENING FOR PROSTATE CANCER: ICD-10-CM

## 2023-01-23 LAB — COMPLEXED PSA SERPL-MCNC: 0.36 NG/ML (ref 0–4)

## 2023-01-23 PROCEDURE — 36415 COLL VENOUS BLD VENIPUNCTURE: CPT | Performed by: INTERNAL MEDICINE

## 2023-01-23 PROCEDURE — 84153 ASSAY OF PSA TOTAL: CPT | Performed by: INTERNAL MEDICINE

## 2023-01-24 ENCOUNTER — OFFICE VISIT (OUTPATIENT)
Dept: FAMILY MEDICINE | Facility: CLINIC | Age: 68
End: 2023-01-24
Payer: MEDICARE

## 2023-01-24 ENCOUNTER — HOSPITAL ENCOUNTER (OUTPATIENT)
Dept: PREADMISSION TESTING | Facility: HOSPITAL | Age: 68
Discharge: HOME | End: 2023-01-24
Attending: INTERNAL MEDICINE
Payer: MEDICARE

## 2023-01-24 ENCOUNTER — PATIENT MESSAGE (OUTPATIENT)
Dept: FAMILY MEDICINE | Facility: CLINIC | Age: 68
End: 2023-01-24

## 2023-01-24 VITALS
DIASTOLIC BLOOD PRESSURE: 77 MMHG | BODY MASS INDEX: 29.26 KG/M2 | WEIGHT: 216 LBS | SYSTOLIC BLOOD PRESSURE: 128 MMHG | HEIGHT: 72 IN | HEART RATE: 78 BPM

## 2023-01-24 DIAGNOSIS — R94.31 ABNORMAL EKG: ICD-10-CM

## 2023-01-24 DIAGNOSIS — I49.3 VENTRICULAR ECTOPICS: ICD-10-CM

## 2023-01-24 DIAGNOSIS — Z12.5 SCREENING FOR PROSTATE CANCER: ICD-10-CM

## 2023-01-24 DIAGNOSIS — N52.8 OTHER MALE ERECTILE DYSFUNCTION: ICD-10-CM

## 2023-01-24 DIAGNOSIS — E79.0 HYPERURICEMIA: ICD-10-CM

## 2023-01-24 DIAGNOSIS — I10 BENIGN ESSENTIAL HYPERTENSION: Primary | ICD-10-CM

## 2023-01-24 DIAGNOSIS — R73.01 IMPAIRED FASTING GLUCOSE: ICD-10-CM

## 2023-01-24 PROCEDURE — 99214 OFFICE O/P EST MOD 30 MIN: CPT | Performed by: INTERNAL MEDICINE

## 2023-01-24 PROCEDURE — 93005 ELECTROCARDIOGRAM TRACING: CPT | Performed by: INTERNAL MEDICINE

## 2023-01-24 PROCEDURE — 93010 EKG 12-LEAD: ICD-10-PCS | Mod: ,,, | Performed by: INTERNAL MEDICINE

## 2023-01-24 PROCEDURE — 93010 ELECTROCARDIOGRAM REPORT: CPT | Mod: ,,, | Performed by: INTERNAL MEDICINE

## 2023-01-24 PROCEDURE — 99214 OFFICE O/P EST MOD 30 MIN: CPT | Mod: S$PBB,AQ,, | Performed by: INTERNAL MEDICINE

## 2023-01-24 PROCEDURE — 99214 PR OFFICE/OUTPT VISIT, EST, LEVL IV, 30-39 MIN: ICD-10-PCS | Mod: S$PBB,AQ,, | Performed by: INTERNAL MEDICINE

## 2023-01-24 RX ORDER — LISINOPRIL 20 MG/1
20 TABLET ORAL DAILY
Qty: 90 TABLET | Refills: 4 | Status: SHIPPED | OUTPATIENT
Start: 2023-01-24 | End: 2023-01-26 | Stop reason: SDUPTHER

## 2023-01-25 ENCOUNTER — TELEPHONE (OUTPATIENT)
Dept: FAMILY MEDICINE | Facility: CLINIC | Age: 68
End: 2023-01-25
Payer: MEDICARE

## 2023-01-25 ENCOUNTER — PATIENT MESSAGE (OUTPATIENT)
Dept: FAMILY MEDICINE | Facility: CLINIC | Age: 68
End: 2023-01-25

## 2023-01-26 DIAGNOSIS — I10 BENIGN ESSENTIAL HYPERTENSION: ICD-10-CM

## 2023-01-26 RX ORDER — LISINOPRIL 20 MG/1
20 TABLET ORAL DAILY
Qty: 90 TABLET | Refills: 4 | OUTPATIENT
Start: 2023-01-26

## 2023-01-27 RX ORDER — LISINOPRIL 20 MG/1
20 TABLET ORAL DAILY
Qty: 90 TABLET | Refills: 4 | Status: SHIPPED | OUTPATIENT
Start: 2023-01-27 | End: 2023-12-21

## 2023-01-30 ENCOUNTER — PATIENT MESSAGE (OUTPATIENT)
Dept: FAMILY MEDICINE | Facility: CLINIC | Age: 68
End: 2023-01-30

## 2023-02-09 ENCOUNTER — TELEPHONE (OUTPATIENT)
Dept: CARDIOLOGY | Facility: CLINIC | Age: 68
End: 2023-02-09
Payer: MEDICARE

## 2023-02-14 ENCOUNTER — OFFICE VISIT (OUTPATIENT)
Dept: FAMILY MEDICINE | Facility: CLINIC | Age: 68
End: 2023-02-14
Payer: MEDICARE

## 2023-02-14 VITALS
DIASTOLIC BLOOD PRESSURE: 70 MMHG | HEIGHT: 72 IN | TEMPERATURE: 98 F | WEIGHT: 218.19 LBS | RESPIRATION RATE: 20 BRPM | SYSTOLIC BLOOD PRESSURE: 150 MMHG | BODY MASS INDEX: 29.55 KG/M2 | HEART RATE: 98 BPM

## 2023-02-14 DIAGNOSIS — H11.32 SUBCONJUNCTIVAL HEMORRHAGE, NON-TRAUMATIC, LEFT: Primary | ICD-10-CM

## 2023-02-14 DIAGNOSIS — H57.9 ITCHY, WATERY, AND RED EYE: ICD-10-CM

## 2023-02-14 PROCEDURE — 99215 OFFICE O/P EST HI 40 MIN: CPT | Performed by: NURSE PRACTITIONER

## 2023-02-14 PROCEDURE — 99213 PR OFFICE/OUTPT VISIT, EST, LEVL III, 20-29 MIN: ICD-10-PCS | Mod: S$PBB,,, | Performed by: NURSE PRACTITIONER

## 2023-02-14 PROCEDURE — 99213 OFFICE O/P EST LOW 20 MIN: CPT | Mod: S$PBB,,, | Performed by: NURSE PRACTITIONER

## 2023-02-14 NOTE — PROGRESS NOTES
Patient ID: Cipriano Gunderson is a 67 y.o. male.    Chief Complaint: Eye Injury (66 yo male here c/o red blood blister in right eye times 5 days. Pt state no discharge, or pain. KM)    Mr. Gunderson presents today for evaluation of eye redness for five days. He states he woke up in the morning after scratching his eye and noticed it. He denies any changes in vision or pain to eye. He states his blood pressure is pretty well controlled at home. He does left weighs and exercise but he states he does not feel any excessive pressure in his eye.    He states he is otherwise well and denies any other issues or complaints.     Eye Injury   The left eye is affected. This is a new problem. The current episode started in the past 7 days. The problem occurs constantly. The problem has been unchanged. There was no injury mechanism. The pain is at a severity of 0/10. The patient is experiencing no pain. There is No known exposure to pink eye. He Does not wear contacts. Associated symptoms include an eye discharge (discharge from eye on awakening.). Pertinent negatives include no fever, itching, nausea, photophobia or vomiting. He has tried eye drops for the symptoms. The treatment provided no relief.     [facility]     He is a patient of Dr. Alberto.     Past Medical History:   Diagnosis Date    Depression     Diabetes mellitus, type 2     GERD (gastroesophageal reflux disease)     Hyperlipidemia     Hypertension      Past Surgical History:   Procedure Laterality Date    DENTAL SURGERY  04/05/2019 5/24/2019    HERNIA REPAIR      NASAL SEPTUM SURGERY           Tobacco History:  reports that he has never smoked. He has never used smokeless tobacco.      Review of patient's allergies indicates:  No Known Allergies    Current Outpatient Medications:     allopurinoL (ZYLOPRIM) 100 MG tablet, , Disp: , Rfl:     aspirin (ECOTRIN) 81 MG EC tablet, Take 1 tablet by mouth Daily., Disp: , Rfl:      atorvastatin (LIPITOR) 10 MG tablet, TAKE 1 TABLET(10 MG) BY MOUTH EVERY DAY, Disp: 90 tablet, Rfl: 3    cetirizine (ZYRTEC) 10 MG tablet, Take 10 mg by mouth once daily., Disp: , Rfl:     cilostazoL (PLETAL) 100 MG Tab, Take 100 mg by mouth 2 (two) times daily., Disp: , Rfl:     diclofenac sodium (VOLTAREN) 1 % Gel, Apply 2 g topically once daily., Disp: 100 g, Rfl: 0    fluticasone propionate (FLONASE) 50 mcg/actuation nasal spray, , Disp: , Rfl:     hydrocortisone valerate (WEST-KIMANI) 0.2 % ointment, 1 Dose once daily., Disp: , Rfl:     ibuprofen (ADVIL,MOTRIN) 800 MG tablet, TAKE 1 TABLET THREE TIMES A DAY AS NEEDED FOR PAIN AND JOINT PAIN, Disp: 90 tablet, Rfl: 11    ipratropium (ATROVENT) 0.03 % nasal spray, USE 2 SPRAYS NASALLY TWICE A DAY, Disp: 90 mL, Rfl: 4    LIDOcaine (LIDODERM) 5 %, 1 patch once daily., Disp: , Rfl:     lisinopriL (PRINIVIL,ZESTRIL) 20 MG tablet, Take 1 tablet (20 mg total) by mouth once daily. Takes half a pill only., Disp: 90 tablet, Rfl: 4    metFORMIN (GLUCOPHAGE) 500 MG tablet, Take 1 tablet (500 mg total) by mouth once daily., Disp: 90 tablet, Rfl: 4    NIFEdipine (ADALAT CC) 60 MG TbSR, TAKE 1 TABLET BY MOUTH DAILY, Disp: 90 tablet, Rfl: 3    tadalafiL (CIALIS) 20 MG Tab, Take by mouth., Disp: , Rfl:     traZODone (DESYREL) 50 MG tablet, 1 tablet daily as needed., Disp: , Rfl:     SYSTANE ULTRA, PF, 0.4-0.3 % Dpet, , Disp: , Rfl:     Review of Systems   Constitutional:  Negative for activity change, appetite change, fatigue and fever.   HENT:  Negative for congestion, dental problem, nosebleeds, postnasal drip, rhinorrhea, sinus pain, sore throat and tinnitus.    Eyes:  Positive for discharge (discharge from eye on awakening.). Negative for photophobia, pain, itching and visual disturbance.   Respiratory:  Negative for cough, chest tightness, shortness of breath and wheezing.    Cardiovascular:  Negative for chest pain.   Gastrointestinal:  Negative for abdominal pain, blood  in stool, constipation, diarrhea, nausea and vomiting.   Endocrine: Negative for cold intolerance, heat intolerance, polydipsia, polyphagia and polyuria.   Genitourinary:  Negative for difficulty urinating, flank pain, genital sores, hematuria and urgency.   Musculoskeletal:  Negative for arthralgias and myalgias.   Skin:  Negative for rash and wound.   Allergic/Immunologic: Negative for environmental allergies and food allergies.   Neurological:  Negative for dizziness, light-headedness and headaches.   Hematological:  Negative for adenopathy. Does not bruise/bleed easily.   Psychiatric/Behavioral:  Negative for behavioral problems, confusion, decreased concentration, sleep disturbance and suicidal ideas. The patient is not nervous/anxious and is not hyperactive.         Objective:      Vitals:    02/14/23 1041   BP: (!) 150/70   Pulse: 98   Resp: 20   Temp: 98.2 °F (36.8 °C)   TempSrc: Oral   Weight: 99 kg (218 lb 3.2 oz)   Height: 6' (1.829 m)     Physical Exam  Vitals reviewed.   Constitutional:       General: He is not in acute distress.     Appearance: Normal appearance. He is normal weight. He is not ill-appearing, toxic-appearing or diaphoretic.   HENT:      Head: Normocephalic and atraumatic.      Right Ear: Tympanic membrane and external ear normal. There is no impacted cerumen.      Left Ear: Tympanic membrane and external ear normal. There is no impacted cerumen.      Nose: Nose normal. No congestion or rhinorrhea.      Mouth/Throat:      Mouth: Mucous membranes are moist.      Pharynx: Oropharynx is clear. No oropharyngeal exudate or posterior oropharyngeal erythema.   Eyes:      General: No scleral icterus.        Right eye: No discharge.         Left eye: No discharge.      Extraocular Movements: Extraocular movements intact.      Conjunctiva/sclera:      Left eye: Hemorrhage present.      Pupils: Pupils are equal, round, and reactive to light.        Comments: Area of subconjunctival eye hemorrhage.     Cardiovascular:      Rate and Rhythm: Normal rate and regular rhythm.      Pulses: Normal pulses.      Heart sounds: Normal heart sounds. No murmur heard.    No friction rub. No gallop.   Pulmonary:      Effort: Pulmonary effort is normal. No respiratory distress.      Breath sounds: Normal breath sounds. No wheezing, rhonchi or rales.   Chest:      Chest wall: No tenderness.   Abdominal:      General: Abdomen is flat. Bowel sounds are normal.      Palpations: Abdomen is soft. There is no mass.      Tenderness: There is no abdominal tenderness. There is no guarding or rebound.   Musculoskeletal:         General: No swelling or signs of injury. Normal range of motion.      Cervical back: Normal range of motion and neck supple. No rigidity or tenderness.      Right lower leg: No edema.      Left lower leg: No edema.   Skin:     General: Skin is warm and dry.      Capillary Refill: Capillary refill takes less than 2 seconds.      Coloration: Skin is not pale.      Findings: No bruising or erythema.   Neurological:      General: No focal deficit present.      Mental Status: He is alert and oriented to person, place, and time. Mental status is at baseline.      Motor: No weakness.      Coordination: Coordination normal.      Gait: Gait normal.   Psychiatric:         Mood and Affect: Mood normal.         Behavior: Behavior normal.         Thought Content: Thought content normal.         Judgment: Judgment normal.         Assessment:       1. Subconjunctival hemorrhage, non-traumatic, left    2. Itchy, watery, and red eye           Plan:       Subconjunctival hemorrhage, non-traumatic, left  -     Ambulatory referral/consult to Ophthalmology; Future; Expected date: 02/21/2023    Itchy, watery, and red eye  -     Ambulatory referral/consult to Ophthalmology; Future; Expected date: 02/21/2023    Patient informed that this is usually a self limiting issue and should start to improve in the next 2-3 weeks. I will refer to    Ophthalmology out of an overabundance of caution. He was advised to continue to activity as normal.     Follow up if symptoms worsen or fail to improve.        2/14/2023 Huma Scanlon NP

## 2023-02-27 ENCOUNTER — OFFICE VISIT (OUTPATIENT)
Dept: CARDIOLOGY | Facility: CLINIC | Age: 68
End: 2023-02-27
Payer: MEDICARE

## 2023-02-27 VITALS
HEIGHT: 72 IN | DIASTOLIC BLOOD PRESSURE: 88 MMHG | BODY MASS INDEX: 29.56 KG/M2 | HEART RATE: 100 BPM | SYSTOLIC BLOOD PRESSURE: 132 MMHG | WEIGHT: 218.25 LBS

## 2023-02-27 DIAGNOSIS — I49.1 PAC (PREMATURE ATRIAL CONTRACTION): Primary | ICD-10-CM

## 2023-02-27 DIAGNOSIS — I49.3 VENTRICULAR ECTOPICS: ICD-10-CM

## 2023-02-27 DIAGNOSIS — R94.31 ABNORMAL EKG: ICD-10-CM

## 2023-02-27 DIAGNOSIS — I49.49 ECTOPIC BEAT: ICD-10-CM

## 2023-02-27 DIAGNOSIS — E78.00 HYPERCHOLESTEROLEMIA: ICD-10-CM

## 2023-02-27 PROCEDURE — 93005 ELECTROCARDIOGRAM TRACING: CPT | Mod: PBBFAC,PN | Performed by: INTERNAL MEDICINE

## 2023-02-27 PROCEDURE — 99203 OFFICE O/P NEW LOW 30 MIN: CPT | Mod: S$PBB,,, | Performed by: INTERNAL MEDICINE

## 2023-02-27 PROCEDURE — 93010 ELECTROCARDIOGRAM REPORT: CPT | Mod: S$PBB,,, | Performed by: INTERNAL MEDICINE

## 2023-02-27 PROCEDURE — 99215 OFFICE O/P EST HI 40 MIN: CPT | Mod: PBBFAC,PN | Performed by: INTERNAL MEDICINE

## 2023-02-27 PROCEDURE — 99999 PR PBB SHADOW E&M-EST. PATIENT-LVL V: ICD-10-PCS | Mod: PBBFAC,,, | Performed by: INTERNAL MEDICINE

## 2023-02-27 PROCEDURE — 99203 PR OFFICE/OUTPT VISIT, NEW, LEVL III, 30-44 MIN: ICD-10-PCS | Mod: S$PBB,,, | Performed by: INTERNAL MEDICINE

## 2023-02-27 PROCEDURE — 93010 EKG 12-LEAD: ICD-10-PCS | Mod: S$PBB,,, | Performed by: INTERNAL MEDICINE

## 2023-02-27 PROCEDURE — 99999 PR PBB SHADOW E&M-EST. PATIENT-LVL V: CPT | Mod: PBBFAC,,, | Performed by: INTERNAL MEDICINE

## 2023-02-28 NOTE — PROGRESS NOTES
Subjective:    Patient ID:  Cipriano Gunderson is a 67 y.o. male patient here for evaluation Establish Care      History of Present Illness:  67-year-old male with past medical history of diabetes, hypertension, hyperlipidemia referred here by patient's PCP for evaluation of abnormal EKG.  Patient was found to have frequent PACs on the EKG with bifascicular block.  Patient had bifascicular block on the prior EKG 2 years ago.  Patient is physically active at baseline and denies any anginal symptoms.  Patient denies any palpitations dizziness syncope.  Patient is compliant with the medications and diet.  Denies any history of heart disease in the past.   BP is usually normal at home per patient.         Review of patient's allergies indicates:  No Known Allergies    Past Medical History:   Diagnosis Date    Depression     Diabetes mellitus, type 2     GERD (gastroesophageal reflux disease)     Hyperlipidemia     Hypertension      Past Surgical History:   Procedure Laterality Date    DENTAL SURGERY  04/05/2019 5/24/2019    HERNIA REPAIR      NASAL SEPTUM SURGERY       Social History     Tobacco Use    Smoking status: Never    Smokeless tobacco: Never   Substance Use Topics    Alcohol use: Yes     Alcohol/week: 1.0 - 2.0 standard drink     Types: 1 - 2 Glasses of wine per week     Comment: weekends    Drug use: No        Review of Systems   Negative except as mentioned in HPI         Objective        Vitals:    02/27/23 1451   BP: 132/88   Pulse: 100       LIPIDS - LAST 2   Lab Results   Component Value Date    CHOL 128 01/18/2023    CHOL 158 05/28/2020    HDL 54 01/18/2023    HDL 44 05/28/2020    LDLCALC 61.8 (L) 01/18/2023    LDLCALC 85.8 05/28/2020    TRIG 61 01/18/2023    TRIG 141 05/28/2020    CHOLHDL 42.2 01/18/2023    CHOLHDL 27.8 05/28/2020       CBC - LAST 2  Lab Results   Component Value Date    WBC 0-2 08/28/2017    RBC 0-2 08/28/2017       CHEMISTRY & LIVER FUNCTION - LAST 2  Lab Results   Component Value  Date     01/18/2023     09/28/2020    K 3.9 01/18/2023    K 3.9 09/28/2020     01/18/2023     09/28/2020    CO2 24 01/18/2023    CO2 22 (L) 09/28/2020    ANIONGAP 9 01/18/2023    ANIONGAP 11 09/28/2020    BUN 11 01/18/2023    BUN 15 09/28/2020    CREATININE 1.0 01/18/2023    CREATININE 1.1 09/28/2020     (H) 01/18/2023     09/28/2020    CALCIUM 9.7 01/18/2023    CALCIUM 10.3 09/28/2020    ALBUMIN 4.5 01/18/2023    ALBUMIN 4.4 09/18/2019    PROT 7.4 01/18/2023    PROT 7.5 09/18/2019    ALKPHOS 98 01/18/2023    ALKPHOS 77 09/18/2019    ALT 24 01/18/2023    ALT 29 09/18/2019    AST 28 01/18/2023    AST 28 09/18/2019    BILITOT 0.6 01/18/2023    BILITOT 0.7 09/18/2019        CARDIAC PROFILE - LAST 2  No results found for: BNP, CPK, CPKMB, LDH, TROPONINI, TROPONINIHS     COAGULATION - LAST 2  No results found for: LABPT, INR, APTT    ENDOCRINE & PSA - LAST 2  Lab Results   Component Value Date    HGBA1C 6.0 01/18/2023    HGBA1C 6.4 (H) 06/02/2021    PSA 0.36 01/23/2023        ECHOCARDIOGRAM RESULTS  No results found for this or any previous visit.      CURRENT/PREVIOUS VISIT EKG  Results for orders placed or performed in visit on 02/27/23   IN OFFICE EKG 12-LEAD (to [place])    Collection Time: 02/27/23  2:59 PM    Narrative    Test Reason : R94.31,    Vent. Rate : 100 BPM     Atrial Rate : 100 BPM     P-R Int : 146 ms          QRS Dur : 122 ms      QT Int : 366 ms       P-R-T Axes : 043 -81 057 degrees     QTc Int : 472 ms    Normal sinus rhythm  Right bundle branch block  Left anterior fascicular block   Bifascicular block   Possible Anteroseptal infarct (cited on or before 24-BILLY-2023)  Abnormal ECG  When compared with ECG of 24-JAN-2023 10:23,  Premature atrial complexes are no longer Present  Questionable change in initial forces of Anterior-septal leads  T wave inversion no longer evident in Inferior leads  Inverted T waves have replaced nonspecific T wave abnormality in  Anterior  leads    Referred By: MIRANDA SERRANO           Confirmed By:      No valid procedures specified.   No results found for this or any previous visit.    No valid procedures specified.          PREVIOUS STRESS TEST              PREVIOUS ANGIOGRAM        PHYSICAL EXAM    CONSTITUTIONAL: Well built, well nourished in no apparent distress  HEENT: No pallor  NECK: no JVD  LUNGS: CTA b/l  HEART: regular rate and rhythm, S1, S2 normal, no murmur   ABDOMEN: soft, non-tender; bowel sounds normal  EXTREMITIES: No edema  NEURO: AAO X 3   SKIN:  No rash  Psych:  Normal affect    I HAVE REVIEWED :    The vital signs, nurses notes, and all the pertinent radiology and labs.        Current Outpatient Medications   Medication Instructions    allopurinoL (ZYLOPRIM) 100 MG tablet No dose, route, or frequency recorded.    aspirin (ECOTRIN) 81 MG EC tablet 1 tablet, Oral, Daily    atorvastatin (LIPITOR) 10 MG tablet TAKE 1 TABLET(10 MG) BY MOUTH EVERY DAY    cetirizine (ZYRTEC) 10 mg, Oral, Daily    cilostazoL (PLETAL) 100 mg, Oral, 2 times daily    diclofenac sodium (VOLTAREN) 2 g, Topical (Top), Daily    fluticasone propionate (FLONASE) 50 mcg/actuation nasal spray No dose, route, or frequency recorded.    hydrocortisone valerate (WEST-KIMANI) 0.2 % ointment 1 Dose, Daily    ibuprofen (ADVIL,MOTRIN) 800 MG tablet TAKE 1 TABLET THREE TIMES A DAY AS NEEDED FOR PAIN AND JOINT PAIN    ipratropium (ATROVENT) 0.03 % nasal spray USE 2 SPRAYS NASALLY TWICE A DAY    LIDOcaine (LIDODERM) 5 % 1 patch, Daily    lisinopriL (PRINIVIL,ZESTRIL) 20 mg, Oral, Daily, Takes half a pill only.    metFORMIN (GLUCOPHAGE) 500 mg, Oral, Daily    NIFEdipine (ADALAT CC) 60 MG TbSR TAKE 1 TABLET BY MOUTH DAILY    SYSTANE ULTRA, PF, 0.4-0.3 % Dpet No dose, route, or frequency recorded.    tadalafiL (CIALIS) 20 MG Tab Oral    traZODone (DESYREL) 50 MG tablet 1 tablet, Daily PRN        ECG reviewed by me:  Sinus rhythm, bifascicular block  Assessment & Plan    67-year-old male with past medical history of diabetes, hypertension, hyperlipidemia referred here by patient's PCP for evaluation of abnormal EKG.  Patient was found to have frequent PACs on the EKG with bifascicular block.  Patient had bifascicular block on the prior EKG 2 years ago.  Patient is physically active at baseline and denies any anginal symptoms.  Patient denies any palpitations dizziness syncope.  Patient is compliant with the medications and diet.  Denies any history of heart disease in the past.   -Frequent PACs and bifascicular block on ECG- 24 hour Holter monitor to evaluate ectopy and rule out any significant arrhythmia  -obtain 2D echocardiogram and thyroid panel  -risk factors controlled          Follow up in about 4 weeks (around 3/27/2023).

## 2023-03-03 ENCOUNTER — HOSPITAL ENCOUNTER (OUTPATIENT)
Dept: CARDIOLOGY | Facility: HOSPITAL | Age: 68
Discharge: HOME | End: 2023-03-03
Attending: INTERNAL MEDICINE
Payer: MEDICARE

## 2023-03-03 VITALS — WEIGHT: 218 LBS | BODY MASS INDEX: 29.53 KG/M2 | HEIGHT: 72 IN

## 2023-03-03 DIAGNOSIS — R94.31 ABNORMAL EKG: ICD-10-CM

## 2023-03-03 PROCEDURE — 93306 TTE W/DOPPLER COMPLETE: CPT

## 2023-03-03 PROCEDURE — 93306 ECHO (CUPID ONLY): ICD-10-PCS | Mod: 26,,, | Performed by: SPECIALIST

## 2023-03-03 PROCEDURE — 93306 TTE W/DOPPLER COMPLETE: CPT | Mod: 26,,, | Performed by: SPECIALIST

## 2023-03-17 ENCOUNTER — PATIENT MESSAGE (OUTPATIENT)
Dept: FAMILY MEDICINE | Facility: CLINIC | Age: 68
End: 2023-03-17

## 2023-03-17 ENCOUNTER — TELEPHONE (OUTPATIENT)
Dept: CARDIOLOGY | Facility: CLINIC | Age: 68
End: 2023-03-17
Payer: MEDICARE

## 2023-03-17 ENCOUNTER — PATIENT MESSAGE (OUTPATIENT)
Dept: CARDIOLOGY | Facility: CLINIC | Age: 68
End: 2023-03-17
Payer: MEDICARE

## 2023-03-17 LAB
AORTIC ROOT ANNULUS: 3.8 CM
AORTIC VALVE CUSP SEPERATION: 2.2 CM
AV INDEX (PROSTH): 0.8
AV MEAN GRADIENT: 5 MMHG
AV PEAK GRADIENT: 10 MMHG
AV VALVE AREA: 3.34 CM2
AV VELOCITY RATIO: 0.75
BSA FOR ECHO PROCEDURE: 2.24 M2
CV ECHO LV RWT: 0.46 CM
DOP CALC AO PEAK VEL: 1.56 M/S
DOP CALC AO VTI: 26.5 CM
DOP CALC LVOT AREA: 4.2 CM2
DOP CALC LVOT DIAMETER: 2.3 CM
DOP CALC LVOT PEAK VEL: 1.17 M/S
DOP CALC LVOT STROKE VOLUME: 88.45 CM3
DOP CALCLVOT PEAK VEL VTI: 21.3 CM
E WAVE DECELERATION TIME: 211 MSEC
E/A RATIO: 0.57
E/E' RATIO: 5.11 M/S
ECHO LV POSTERIOR WALL: 1.15 CM (ref 0.6–1.1)
EJECTION FRACTION: 65 %
FRACTIONAL SHORTENING: 31 % (ref 28–44)
INTERVENTRICULAR SEPTUM: 1.6 CM (ref 0.6–1.1)
IVRT: 100 MSEC
LEFT ATRIUM SIZE: 4.1 CM
LEFT INTERNAL DIMENSION IN SYSTOLE: 3.48 CM (ref 2.1–4)
LEFT VENTRICLE DIASTOLIC VOLUME INDEX: 54.3 ML/M2
LEFT VENTRICLE DIASTOLIC VOLUME: 120 ML
LEFT VENTRICLE MASS INDEX: 130 G/M2
LEFT VENTRICLE SYSTOLIC VOLUME INDEX: 22.7 ML/M2
LEFT VENTRICLE SYSTOLIC VOLUME: 50.2 ML
LEFT VENTRICULAR INTERNAL DIMENSION IN DIASTOLE: 5.04 CM (ref 3.5–6)
LEFT VENTRICULAR MASS: 287.39 G
LV LATERAL E/E' RATIO: 4.18 M/S
LV SEPTAL E/E' RATIO: 6.57 M/S
LVOT MG: 3 MMHG
LVOT MV: 0.77 CM/S
MV PEAK A VEL: 0.81 M/S
MV PEAK E VEL: 0.46 M/S
MV STENOSIS PRESSURE HALF TIME: 69 MS
MV VALVE AREA P 1/2 METHOD: 3.19 CM2
RA PRESSURE: 3 MMHG
RIGHT VENTRICULAR END-DIASTOLIC DIMENSION: 3.11 CM
TDI LATERAL: 0.11 M/S
TDI SEPTAL: 0.07 M/S
TDI: 0.09 M/S

## 2023-03-17 RX ORDER — METOPROLOL SUCCINATE 25 MG/1
25 TABLET, EXTENDED RELEASE ORAL DAILY
Qty: 90 TABLET | Refills: 1 | Status: SHIPPED | OUTPATIENT
Start: 2023-03-17 | End: 2023-07-05

## 2023-03-29 ENCOUNTER — OFFICE VISIT (OUTPATIENT)
Dept: CARDIOLOGY | Facility: CLINIC | Age: 68
End: 2023-03-29
Payer: MEDICARE

## 2023-03-29 VITALS
HEIGHT: 72 IN | BODY MASS INDEX: 29.26 KG/M2 | DIASTOLIC BLOOD PRESSURE: 90 MMHG | WEIGHT: 216.06 LBS | HEART RATE: 72 BPM | SYSTOLIC BLOOD PRESSURE: 152 MMHG

## 2023-03-29 DIAGNOSIS — I47.10 SVT (SUPRAVENTRICULAR TACHYCARDIA): Primary | ICD-10-CM

## 2023-03-29 PROCEDURE — 99214 OFFICE O/P EST MOD 30 MIN: CPT | Mod: S$PBB,,, | Performed by: INTERNAL MEDICINE

## 2023-03-29 PROCEDURE — 99214 PR OFFICE/OUTPT VISIT, EST, LEVL IV, 30-39 MIN: ICD-10-PCS | Mod: S$PBB,,, | Performed by: INTERNAL MEDICINE

## 2023-03-29 PROCEDURE — 99999 PR PBB SHADOW E&M-EST. PATIENT-LVL V: CPT | Mod: PBBFAC,,, | Performed by: INTERNAL MEDICINE

## 2023-03-29 PROCEDURE — 99999 PR PBB SHADOW E&M-EST. PATIENT-LVL V: ICD-10-PCS | Mod: PBBFAC,,, | Performed by: INTERNAL MEDICINE

## 2023-03-29 PROCEDURE — 99215 OFFICE O/P EST HI 40 MIN: CPT | Mod: PBBFAC,PN | Performed by: INTERNAL MEDICINE

## 2023-03-29 NOTE — PROGRESS NOTES
Subjective:    Patient ID:  Cipirano Gunderson is a 67 y.o. male patient here for evaluation Results and Hypertension    Follow-up visit 03/29/2023:  Patient underwent echocardiogram and Holter monitor.  He is totally asymptomatic.   Holter monitor    1. Sinus rhythm marked sinus arrhythmias with rates of 54 bpm to 121 bpm. Mean rate 77 bpm.  2. Rare isolated multifocal PVC's averaging five an hour. 137 total beats. No couplets or non-sustained runs noted.  3. Frequent PAC's, pairs and approximately 98 episodes of SVT. Longest run of 16 beats at 11:33:01 pm. Fastest run at a rate of 124 bpm at 11:29:13 pm. The PAC's averaged 460/hour. 46093 total beats. Occasional runs of bigeminy and trigeminy detected.  4. No significant pauses.  5. No diary returned. No symptoms reported.    Echocardiogram  The left ventricle is normal in size with mild concentric hypertrophy and normal systolic function.  The estimated ejection fraction is 65%.  Normal left ventricular diastolic function.  Atrial fibrillation not observed.  Normal right ventricular size with normal right ventricular systolic function.  Normal central venous pressure (3 mmHg).  Mean left r atrial pressure normal no pulmonary hypertension      History of Present Illness during initial visit:  67-year-old male with past medical history of diabetes, hypertension, hyperlipidemia referred here by patient's PCP for evaluation of abnormal EKG.  Patient was found to have frequent PACs on the EKG with bifascicular block.  Patient had bifascicular block on the prior EKG 2 years ago.  Patient is physically active at baseline and denies any anginal symptoms.  Patient denies any palpitations dizziness syncope.  Patient is compliant with the medications and diet.  Denies any history of heart disease in the past.   BP is usually normal at home per patient.         Review of patient's allergies indicates:  No Known Allergies    Past Medical History:   Diagnosis Date    Depression      Diabetes mellitus, type 2     GERD (gastroesophageal reflux disease)     Hyperlipidemia     Hypertension      Past Surgical History:   Procedure Laterality Date    DENTAL SURGERY  04/05/2019 5/24/2019    HERNIA REPAIR      NASAL SEPTUM SURGERY       Social History     Tobacco Use    Smoking status: Never    Smokeless tobacco: Never   Substance Use Topics    Alcohol use: Yes     Alcohol/week: 1.0 - 2.0 standard drink     Types: 1 - 2 Glasses of wine per week     Comment: weekends    Drug use: No        Review of Systems   Negative except as mentioned in HPI         Objective        Vitals:    03/29/23 1341   BP: (!) 152/90   Pulse: 72       LIPIDS - LAST 2   Lab Results   Component Value Date    CHOL 128 01/18/2023    CHOL 158 05/28/2020    HDL 54 01/18/2023    HDL 44 05/28/2020    LDLCALC 61.8 (L) 01/18/2023    LDLCALC 85.8 05/28/2020    TRIG 61 01/18/2023    TRIG 141 05/28/2020    CHOLHDL 42.2 01/18/2023    CHOLHDL 27.8 05/28/2020       CBC - LAST 2  Lab Results   Component Value Date    WBC 0-2 08/28/2017    RBC 0-2 08/28/2017       CHEMISTRY & LIVER FUNCTION - LAST 2  Lab Results   Component Value Date     01/18/2023     09/28/2020    K 3.9 01/18/2023    K 3.9 09/28/2020     01/18/2023     09/28/2020    CO2 24 01/18/2023    CO2 22 (L) 09/28/2020    ANIONGAP 9 01/18/2023    ANIONGAP 11 09/28/2020    BUN 11 01/18/2023    BUN 15 09/28/2020    CREATININE 1.0 01/18/2023    CREATININE 1.1 09/28/2020     (H) 01/18/2023     09/28/2020    CALCIUM 9.7 01/18/2023    CALCIUM 10.3 09/28/2020    ALBUMIN 4.5 01/18/2023    ALBUMIN 4.4 09/18/2019    PROT 7.4 01/18/2023    PROT 7.5 09/18/2019    ALKPHOS 98 01/18/2023    ALKPHOS 77 09/18/2019    ALT 24 01/18/2023    ALT 29 09/18/2019    AST 28 01/18/2023    AST 28 09/18/2019    BILITOT 0.6 01/18/2023    BILITOT 0.7 09/18/2019        CARDIAC PROFILE - LAST 2  No results found for: BNP, CPK, CPKMB, LDH, TROPONINI, TROPONINIHS      COAGULATION - LAST 2  No results found for: LABPT, INR, APTT    ENDOCRINE & PSA - LAST 2  Lab Results   Component Value Date    HGBA1C 6.0 01/18/2023    HGBA1C 6.4 (H) 06/02/2021    PSA 0.36 01/23/2023        ECHOCARDIOGRAM RESULTS  No results found for this or any previous visit.      CURRENT/PREVIOUS VISIT EKG  Results for orders placed or performed in visit on 02/27/23   IN OFFICE EKG 12-LEAD (to [place])    Collection Time: 02/27/23  2:59 PM    Narrative    Test Reason : R94.31,    Vent. Rate : 100 BPM     Atrial Rate : 100 BPM     P-R Int : 146 ms          QRS Dur : 122 ms      QT Int : 366 ms       P-R-T Axes : 043 -81 057 degrees     QTc Int : 472 ms    Normal sinus rhythm  Right bundle branch block  Left anterior fascicular block   Bifascicular block   Possible Anteroseptal infarct (cited on or before 24-JAN-2023)  Abnormal ECG  When compared with ECG of 24-JAN-2023 10:23,  Premature atrial complexes are no longer Present  Questionable change in initial forces of Anterior-septal leads  T wave inversion no longer evident in Inferior leads  Inverted T waves have replaced nonspecific T wave abnormality in Anterior  leads  Confirmed by Jose Jaramillo MD ([id number]) on 3/20/2023 3:09:13 PM    Referred By: MIRANDA SERRANO           Confirmed By:Jose Jaramillo MD     No valid procedures specified.   No results found for this or any previous visit.    No valid procedures specified.          PREVIOUS STRESS TEST              PREVIOUS ANGIOGRAM        PHYSICAL EXAM    CONSTITUTIONAL: Well built, well nourished in no apparent distress  HEENT: No pallor  NECK: no JVD  LUNGS: CTA b/l  HEART: regular rate and rhythm, S1, S2 normal, no murmur   ABDOMEN: soft, non-tender; bowel sounds normal  EXTREMITIES: No edema  NEURO: AAO X 3   SKIN:  No rash  Psych:  Normal affect    I HAVE REVIEWED :    The vital signs, nurses notes, and all the pertinent radiology and labs.        Current Outpatient Medications   Medication Instructions     allopurinoL (ZYLOPRIM) 100 MG tablet No dose, route, or frequency recorded.    aspirin (ECOTRIN) 81 MG EC tablet 1 tablet, Oral, Daily    atorvastatin (LIPITOR) 10 MG tablet TAKE 1 TABLET(10 MG) BY MOUTH EVERY DAY    cetirizine (ZYRTEC) 10 mg, Oral, Daily    cilostazoL (PLETAL) 100 mg, Oral, 2 times daily, Pt states taking 1 tab daily.    diclofenac sodium (VOLTAREN) 2 g, Topical (Top), Daily    fluticasone propionate (FLONASE) 50 mcg/actuation nasal spray No dose, route, or frequency recorded.    hydrocortisone valerate (WEST-KIMANI) 0.2 % ointment 1 Dose, Daily    ibuprofen (ADVIL,MOTRIN) 800 MG tablet TAKE 1 TABLET THREE TIMES A DAY AS NEEDED FOR PAIN AND JOINT PAIN    ipratropium (ATROVENT) 0.03 % nasal spray USE 2 SPRAYS NASALLY TWICE A DAY    LIDOcaine (LIDODERM) 5 % 1 patch, Daily    lisinopriL (PRINIVIL,ZESTRIL) 20 mg, Oral, Daily, Takes half a pill only.    metFORMIN (GLUCOPHAGE) 500 mg, Oral, Daily    metoprolol succinate (TOPROL-XL) 25 mg, Oral, Daily    NIFEdipine (ADALAT CC) 60 MG TbSR TAKE 1 TABLET BY MOUTH DAILY    SYSTANE ULTRA, PF, 0.4-0.3 % Dpet No dose, route, or frequency recorded.    tadalafiL (CIALIS) 20 mg, Oral, Daily    traZODone (DESYREL) 50 MG tablet 1 tablet, Daily PRN          Assessment & Plan   67-year-old male with past medical history of diabetes, hypertension, hyperlipidemia referred here by patient's PCP for evaluation of abnormal EKG.  Patient was found to have frequent PACs on the EKG with bifascicular block.  Patient had bifascicular block on the prior EKG 2 years ago.  Patient is physically active at baseline and denies any anginal symptoms.  Patient denies any palpitations dizziness syncope.  Patient is compliant with the medications and diet.  Denies any history of heart disease in the past.     Frequent PACs and brief SVT episodes on Holter monitor- started on beta-blocker.  Normal LV function on echo. Will refer to electrophysiology.  Thyroid profile pending.  Patient is  completely asymptomatic.   BP elevated today however patient states it is controlled at home.  Possible white coat htn.  Continue nifedipine and lisinopril.  Home BP monitoring  Dyslipidemia controlled.  Continue atorvastatin.           Follow up in about 3 months (around 6/29/2023).

## 2023-03-30 ENCOUNTER — LAB VISIT (OUTPATIENT)
Dept: LAB | Facility: HOSPITAL | Age: 68
End: 2023-03-30
Attending: INTERNAL MEDICINE
Payer: MEDICARE

## 2023-03-30 DIAGNOSIS — E78.00 HYPERCHOLESTEROLEMIA: ICD-10-CM

## 2023-03-30 LAB
T4 FREE SERPL-MCNC: 0.71 NG/DL (ref 0.71–1.51)
TSH SERPL DL<=0.005 MIU/L-ACNC: 1.64 UIU/ML (ref 0.34–5.6)

## 2023-03-30 PROCEDURE — 84443 ASSAY THYROID STIM HORMONE: CPT | Performed by: INTERNAL MEDICINE

## 2023-03-30 PROCEDURE — 84439 ASSAY OF FREE THYROXINE: CPT | Performed by: INTERNAL MEDICINE

## 2023-03-30 PROCEDURE — 84480 ASSAY TRIIODOTHYRONINE (T3): CPT | Performed by: INTERNAL MEDICINE

## 2023-03-30 PROCEDURE — 36415 COLL VENOUS BLD VENIPUNCTURE: CPT | Performed by: INTERNAL MEDICINE

## 2023-03-31 LAB — T3 SERPL-MCNC: 118 NG/DL (ref 71–180)

## 2023-04-14 DIAGNOSIS — I49.8 OTHER CARDIAC ARRHYTHMIA: Primary | ICD-10-CM

## 2023-04-17 ENCOUNTER — OFFICE VISIT (OUTPATIENT)
Dept: ELECTROPHYSIOLOGY | Facility: CLINIC | Age: 68
End: 2023-04-17
Payer: MEDICARE

## 2023-04-17 ENCOUNTER — HOSPITAL ENCOUNTER (OUTPATIENT)
Dept: CARDIOLOGY | Facility: CLINIC | Age: 68
Discharge: HOME | End: 2023-04-17
Payer: MEDICARE

## 2023-04-17 VITALS
BODY MASS INDEX: 29.31 KG/M2 | WEIGHT: 221.13 LBS | HEIGHT: 73 IN | SYSTOLIC BLOOD PRESSURE: 145 MMHG | HEART RATE: 75 BPM | DIASTOLIC BLOOD PRESSURE: 85 MMHG

## 2023-04-17 DIAGNOSIS — I73.9 PERIPHERAL ARTERIAL DISEASE: ICD-10-CM

## 2023-04-17 DIAGNOSIS — E78.00 HYPERCHOLESTEROLEMIA: ICD-10-CM

## 2023-04-17 DIAGNOSIS — I47.10 SVT (SUPRAVENTRICULAR TACHYCARDIA): ICD-10-CM

## 2023-04-17 DIAGNOSIS — I10 BENIGN ESSENTIAL HYPERTENSION: ICD-10-CM

## 2023-04-17 DIAGNOSIS — I49.8 OTHER CARDIAC ARRHYTHMIA: ICD-10-CM

## 2023-04-17 DIAGNOSIS — I49.49 ECTOPIC BEAT: Primary | ICD-10-CM

## 2023-04-17 PROCEDURE — 93010 ELECTROCARDIOGRAM REPORT: CPT | Mod: S$PBB,,, | Performed by: INTERNAL MEDICINE

## 2023-04-17 PROCEDURE — 93005 ELECTROCARDIOGRAM TRACING: CPT | Mod: PBBFAC | Performed by: INTERNAL MEDICINE

## 2023-04-17 PROCEDURE — 99205 OFFICE O/P NEW HI 60 MIN: CPT | Mod: S$PBB,,, | Performed by: INTERNAL MEDICINE

## 2023-04-17 PROCEDURE — 99999 PR PBB SHADOW E&M-EST. PATIENT-LVL III: ICD-10-PCS | Mod: PBBFAC,,, | Performed by: INTERNAL MEDICINE

## 2023-04-17 PROCEDURE — 99205 PR OFFICE/OUTPT VISIT, NEW, LEVL V, 60-74 MIN: ICD-10-PCS | Mod: S$PBB,,, | Performed by: INTERNAL MEDICINE

## 2023-04-17 PROCEDURE — 99999 PR PBB SHADOW E&M-EST. PATIENT-LVL III: CPT | Mod: PBBFAC,,, | Performed by: INTERNAL MEDICINE

## 2023-04-17 PROCEDURE — 93010 RHYTHM STRIP: ICD-10-PCS | Mod: S$PBB,,, | Performed by: INTERNAL MEDICINE

## 2023-04-17 PROCEDURE — 99213 OFFICE O/P EST LOW 20 MIN: CPT | Mod: PBBFAC | Performed by: INTERNAL MEDICINE

## 2023-04-17 NOTE — PROGRESS NOTES
Subjective:     HPI    I had the pleasure of seeing Cipriano Gunderson in consultation at your request for the evaluation of PACs. He is a 68M with HTN, DM2, bifascicular block (since at least 2/2021 based on ECGs in the EMR), who was recently noted by his PCP to have frequent ectopy on ECG. This prompted a cardiology work-up, including A 24 hr Holter in 2/2023 which showed sinus rhythm with an average rate of 77 bpm (range  bpm), 137 PVCs, 26680 PACs (10% burden), 98 runs of nonsustained AT (longest 16 beats), no sustained arrhythmias. Pt is currently on toprol xl 25 mg daily, which he is tolerating.    Echo in 3/2023 showed EF 65%.    Mr. Gunderson exercises on averages 3-5 days per week (weighlifting, cycling, elliptical).    Mr. Gunderson describes occasional episodes of palpitations, difficult for him to quantify how often or how long episodes last.    My interpretation of today's ECG is sinus rhythm with frequent PACs at 75 bpm.    Review of Systems   Constitutional: Negative for decreased appetite, malaise/fatigue, weight gain and weight loss.   HENT:  Negative for sore throat.    Eyes:  Negative for blurred vision.   Cardiovascular:  Negative for chest pain, dyspnea on exertion, irregular heartbeat, leg swelling, near-syncope, orthopnea, palpitations, paroxysmal nocturnal dyspnea and syncope.   Respiratory:  Negative for shortness of breath.    Skin:  Negative for rash.   Musculoskeletal:  Negative for arthritis.   Gastrointestinal:  Negative for abdominal pain.   Neurological:  Negative for focal weakness.   Psychiatric/Behavioral:  Negative for altered mental status.      Objective:   Physical Exam  Constitutional:       General: He is not in acute distress.     Appearance: He is well-developed.   HENT:      Head: Normocephalic and atraumatic.   Eyes:      General: No scleral icterus.     Pupils: Pupils are equal, round, and reactive to light.   Neck:      Thyroid: No thyromegaly.   Cardiovascular:       Rate and Rhythm: Regular rhythm.      Pulses: Normal pulses.      Heart sounds: Normal heart sounds. No murmur heard.    No friction rub. No gallop.   Pulmonary:      Effort: Pulmonary effort is normal.      Breath sounds: Normal breath sounds.   Abdominal:      General: Bowel sounds are normal. There is no distension.      Palpations: Abdomen is soft.      Tenderness: There is no abdominal tenderness.   Musculoskeletal:      Cervical back: Neck supple.   Skin:     General: Skin is warm and dry.      Findings: No rash.   Neurological:      Mental Status: He is alert and oriented to person, place, and time.   Psychiatric:         Behavior: Behavior normal.       Assessment:      1. Ectopic beat    2. Benign essential hypertension    3. Hypercholesterolemia        Plan:     In summary, Cipriano Gunderson is a 68M with frequent PACs. No symptoms or data to suggest pt is having sustained arrhythmias. Reassurance provided. Plan is for 2 week Zio monitor. If negative will plan to continue toprol and follow-up in 1 year.    Thank you for allowing me to participate in the care of this patient. Please do not hesitate to call me with any questions or concerns.

## 2023-04-18 ENCOUNTER — CLINICAL SUPPORT (OUTPATIENT)
Dept: CARDIOLOGY | Facility: HOSPITAL | Age: 68
End: 2023-04-18
Attending: INTERNAL MEDICINE
Payer: MEDICARE

## 2023-04-18 DIAGNOSIS — I49.49 ECTOPIC BEAT: ICD-10-CM

## 2023-04-18 DIAGNOSIS — I47.10 SVT (SUPRAVENTRICULAR TACHYCARDIA): ICD-10-CM

## 2023-04-18 PROCEDURE — 93248 CV CARDIAC MONITOR - 3-15 DAY ADULT (CUPID ONLY): ICD-10-PCS | Mod: ,,, | Performed by: INTERNAL MEDICINE

## 2023-04-18 PROCEDURE — 93248 EXT ECG>7D<15D REV&INTERPJ: CPT | Mod: ,,, | Performed by: INTERNAL MEDICINE

## 2023-05-18 LAB
OHS CV EVENT MONITOR DAY: 13
OHS CV HOLTER HOOKUP DATE: NORMAL
OHS CV HOLTER HOOKUP TIME: NORMAL
OHS CV HOLTER LENGTH DECIMAL HOURS: 335.33
OHS CV HOLTER LENGTH HOURS: 23
OHS CV HOLTER LENGTH MINUTES: 20
OHS CV HOLTER SCAN DATE: NORMAL
OHS CV HOLTER SINUS AVERAGE HR: 78 BPM
OHS CV HOLTER SINUS MAX HR: 156 BPM
OHS CV HOLTER SINUS MIN HR: 48 BPM
OHS CV HOLTER STUDY END DATE: NORMAL
OHS CV HOLTER STUDY END TIME: NORMAL

## 2023-05-23 ENCOUNTER — TELEPHONE (OUTPATIENT)
Dept: CARDIOLOGY | Facility: CLINIC | Age: 68
End: 2023-05-23
Payer: MEDICARE

## 2023-07-04 NOTE — PROGRESS NOTES
Subjective:       Patient ID: Cipriano Gunderson is a 68 y.o. male.    Chief Complaint: Hyperlipidemia, Hypertension, and Hyperuricemia    Patient is a 68-year-old male who comes for follow-up.    1. Benign essential hypertension   2. Hypercholesterolemia   3. Hyperuricemia   4. Impaired fasting glucose   5. Other male erectile dysfunction   6. Non-seasonal allergic rhinitis due to pollen :-on Flonase and ipratropium  7.         Prescription for cilostazol noted for peripheral vascular disease.  This was based upon testing.  Not sure how his symptoms are.       Patient's recent labs show improved hemoglobin A1c though fasting sugar is 117.    Lipid panel is improved.      PSA test has been ordered recently and will be pending.  PSA test is also within normal range.    He recently had a fit test done which was negative.    Pneumococcal 23 vaccine has been mentioned in 2017 with no proof in our system.  He has been offered Prevnar 20 vaccine.    Allopurinol for gout prevention has been noted.      Baseline baby aspirin has been noted.      Cetirizine/Flonase/Atrovent nasal spray for sinuses.      Ibuprofen 800 mg for arthritis pains.      He continues to exercise and weight strength regularly.      //////////////////////      Past medical issues reviewed include perhaps diagnosis of mild peripheral vascular disease based upon symptoms of claudication.  Patient has been prescribed pletal.  Details of those encounters are not known to me.  Uncertain if it really helps him.  His exercise tolerance based upon his bicycling and exercise is good regardless.      Erectile dysfunction has been noted with some degree of relief from use of Cialis.  In past he had asked for a injectable form of Cialis which I assume he was asking for testosterone.  I do not believe currently during prevalent COVID-19 issues and risk for thrombogenic events if 1 has to have infection, this would be a good idea.  Will address this issue in  future.    Hypertension  This is a chronic problem. The current episode started more than 1 year ago. The problem is controlled. Pertinent negatives include no chest pain, headaches, neck pain, palpitations or shortness of breath. Risk factors for coronary artery disease include male gender and dyslipidemia. Past treatments include ACE inhibitors. The current treatment provides moderate improvement. Compliance problems include psychosocial issues.    Hyperlipidemia  This is a chronic problem. The current episode started more than 1 year ago. The problem is controlled. He has no history of obesity. Pertinent negatives include no chest pain or shortness of breath. Current antihyperlipidemic treatment includes statins. The current treatment provides moderate improvement of lipids. Compliance problems include psychosocial issues.  Risk factors for coronary artery disease include hypertension, male sex and dyslipidemia.   Sinus Problem  This is a chronic problem. The current episode started more than 1 year ago. The problem has been waxing and waning since onset. There has been no fever. Pertinent negatives include no headaches, neck pain or shortness of breath. Treatments tried: Zyrtec and Atrovent. The treatment provided moderate relief.     Past Medical History:   Diagnosis Date    Depression     Diabetes mellitus, type 2     GERD (gastroesophageal reflux disease)     Hyperlipidemia     Hypertension      Social History     Socioeconomic History    Marital status:      Spouse name: Julieta    Number of children: 2   Occupational History    Occupation: [employer]   Tobacco Use    Smoking status: Never    Smokeless tobacco: Never   Substance and Sexual Activity    Alcohol use: Yes     Alcohol/week: 1.0 - 2.0 standard drink     Types: 1 - 2 Glasses of wine per week     Comment: weekends    Drug use: No    Sexual activity: Yes     Partners: Female     Social Determinants of Health     Financial Resource Strain: Low  Risk     Difficulty of Paying Living Expenses: Not hard at all   Food Insecurity: No Food Insecurity    Worried About Running Out of Food in the Last Year: Never true    Ran Out of Food in the Last Year: Never true   Transportation Needs: No Transportation Needs    Lack of Transportation (Medical): No    Lack of Transportation (Non-Medical): No   Physical Activity: Sufficiently Active    Days of Exercise per Week: 3 days    Minutes of Exercise per Session: 60 min   Stress: No Stress Concern Present    Feeling of Stress : Not at all   Social Connections: Unknown    Frequency of Communication with Friends and Family: More than three times a week    Frequency of Social Gatherings with Friends and Family: Three times a week    Active Member of Clubs or Organizations: Yes    Attends Club or Organization Meetings: More than 4 times per year    Marital Status:    Housing Stability: Low Risk     Unable to Pay for Housing in the Last Year: No    Number of Places Lived in the Last Year: 1    Unstable Housing in the Last Year: No     Past Surgical History:   Procedure Laterality Date    DENTAL SURGERY  04/05/2019 5/24/2019    HERNIA REPAIR      NASAL SEPTUM SURGERY       Family History   Problem Relation Age of Onset    Hypertension Mother     Cancer Mother         ??    Hypertension Father     Heart disease Father     Heart failure Father        Review of Systems   Constitutional:  Negative for activity change, fever and unexpected weight change (Gained 13 lb of weight.  Mostly muscles and working gym.).   HENT:  Negative for hearing loss, rhinorrhea and trouble swallowing.    Eyes:  Negative for discharge, redness and visual disturbance.   Respiratory:  Negative for chest tightness, shortness of breath and wheezing.    Cardiovascular:  Negative for chest pain and palpitations.   Gastrointestinal:  Negative for blood in stool, constipation, diarrhea and vomiting.   Endocrine: Negative for polydipsia and polyuria.  "  Genitourinary:  Negative for difficulty urinating.   Musculoskeletal:  Negative for arthralgias, joint swelling and neck pain.   Neurological:  Negative for weakness and headaches.   Psychiatric/Behavioral:  Negative for confusion and dysphoric mood.         A certain rigidity and compulsiveness in his behavior.       Objective:      Blood pressure 137/80, pulse 77, height 6' 1" (1.854 m), weight 100.4 kg (221 lb 6.4 oz), SpO2 95 %. Body mass index is 29.21 kg/m².  Physical Exam  Constitutional:       General: He is not in acute distress.     Appearance: Normal appearance. He is not ill-appearing, toxic-appearing or diaphoretic.      Comments: BMI is 29.29 gained 13 lb of weight since the last documentation.  Attributes mostly to muscular gained.   Eyes:      General: No scleral icterus.  Cardiovascular:      Rate and Rhythm: Normal rate. Rhythm irregular. Occasional Extrasystoles are present.     Pulses: Normal pulses.      Heart sounds: Normal heart sounds.      Comments: Occasional ectopics are felt in his pulse.  Pulmonary:      Effort: Pulmonary effort is normal.      Breath sounds: Normal breath sounds.   Abdominal:      General: There is no distension.      Tenderness: There is no abdominal tenderness.   Musculoskeletal:      Right lower leg: No edema.      Left lower leg: No edema.   Skin:     Findings: No lesion or rash.   Neurological:      Mental Status: Mental status is at baseline.   Psychiatric:      Comments: Grave affect         Assessment:               Benign essential hypertension  -     Basic Metabolic Panel; Future; Expected date: 09/25/2023    Hyperuricemia  -     Basic Metabolic Panel; Future; Expected date: 09/25/2023  -     Uric Acid; Future; Expected date: 09/25/2023    Chronic midline low back pain without sciatica  -     ibuprofen (ADVIL,MOTRIN) 800 MG tablet; TAKE 1 TABLET THREE TIMES A DAY AS NEEDED FOR PAIN AND JOINT PAIN  Dispense: 90 tablet; Refill: 4    Elevated blood sugar  -     " Hemoglobin A1C; Future; Expected date: 10/06/2023       Clinical Support on 04/18/2023   Component Date Value Ref Range Status    Holter Hookup Date 04/18/2023 [id number]   Final    Holter Hookup Time 04/18/2023 [id number]   Final    Holter Study End Date 04/18/2023 [id number]   Final    Holter Study End Time 04/18/2023 [id number]   Final    Holter Scan Date 04/18/2023 [id number]   Final    Sinus min HR 04/18/2023 48  bpm Final    Sinus max hr 04/18/2023 156  bpm Final    Sinus avg hr 04/18/2023 78  bpm Final    Event Monitor Day 04/18/2023 13   Final    Holter length hours 04/18/2023 23   Final    holter length minutes 04/18/2023 20   Final    holter length dec hours 04/18/2023 335.33   Final         Plan:           Benign essential hypertension  -     Basic Metabolic Panel; Future; Expected date: 09/25/2023    Hyperuricemia  -     Basic Metabolic Panel; Future; Expected date: 09/25/2023  -     Uric Acid; Future; Expected date: 09/25/2023    Chronic midline low back pain without sciatica  -     ibuprofen (ADVIL,MOTRIN) 800 MG tablet; TAKE 1 TABLET THREE TIMES A DAY AS NEEDED FOR PAIN AND JOINT PAIN  Dispense: 90 tablet; Refill: 4    Elevated blood sugar  -     Hemoglobin A1C; Future; Expected date: 10/06/2023      Patient's blood pressures are doing okay.      No recent attack of gout.      Elevated blood sugars in past have been noted though he continues to watch his diet and exercise regularly.  Check A1c for follow-up.  Ibuprofen refilled and cautioned against frequent use.  Denies any pain in the legs while exercising and not sure if he needs to continue on out.    Follow-up in 4-6 months time.  Continue with COVID precautions.      Current Outpatient Medications:     allopurinoL (ZYLOPRIM) 100 MG tablet, , Disp: , Rfl:     aspirin (ECOTRIN) 81 MG EC tablet, Take 1 tablet by mouth Daily., Disp: , Rfl:     atorvastatin (LIPITOR) 10 MG tablet, TAKE 1 TABLET(10 MG) BY MOUTH EVERY DAY, Disp: 90 tablet, Rfl: 3    cetirizine  (ZYRTEC) 10 MG tablet, Take 10 mg by mouth once daily., Disp: , Rfl:     cilostazoL (PLETAL) 100 MG Tab, Take 100 mg by mouth 2 (two) times daily. Pt states taking 1 tab daily., Disp: , Rfl:     diclofenac sodium (VOLTAREN) 1 % Gel, Apply 2 g topically once daily., Disp: 100 g, Rfl: 0    fluticasone propionate (FLONASE) 50 mcg/actuation nasal spray, , Disp: , Rfl:     hydrocortisone valerate (WEST-KIMANI) 0.2 % ointment, 1 Dose once daily., Disp: , Rfl:     ibuprofen (ADVIL,MOTRIN) 800 MG tablet, TAKE 1 TABLET THREE TIMES A DAY AS NEEDED FOR PAIN AND JOINT PAIN, Disp: 90 tablet, Rfl: 4    ipratropium (ATROVENT) 0.03 % nasal spray, USE 2 SPRAYS NASALLY TWICE A DAY, Disp: 90 mL, Rfl: 4    LIDOcaine (LIDODERM) 5 %, 1 patch once daily. PRN, Disp: , Rfl:     lisinopriL (PRINIVIL,ZESTRIL) 20 MG tablet, Take 1 tablet (20 mg total) by mouth once daily. Takes half a pill only. (Patient taking differently: Take 20 mg by mouth once daily. Takes half a pill only. 3/29/23 Pt states taking full tab daily.), Disp: 90 tablet, Rfl: 4    metFORMIN (GLUCOPHAGE) 500 MG tablet, Take 1 tablet (500 mg total) by mouth once daily. (Patient taking differently: Take 500 mg by mouth once daily. PRN), Disp: 90 tablet, Rfl: 4    metoprolol succinate (TOPROL-XL) 50 MG 24 hr tablet, Take 1 tablet (50 mg total) by mouth once daily., Disp: 30 tablet, Rfl: 5    NIFEdipine (ADALAT CC) 60 MG TbSR, TAKE 1 TABLET BY MOUTH DAILY, Disp: 90 tablet, Rfl: 3    rosuvastatin (CRESTOR) 20 MG tablet, Take 20 mg by mouth every evening., Disp: , Rfl:     SYSTANE ULTRA, PF, 0.4-0.3 % Dpet, , Disp: , Rfl:     tadalafiL (CIALIS) 20 MG Tab, Take 20 mg by mouth once daily., Disp: , Rfl:     traZODone (DESYREL) 50 MG tablet, 1 tablet daily as needed., Disp: , Rfl:

## 2023-07-05 ENCOUNTER — OFFICE VISIT (OUTPATIENT)
Dept: CARDIOLOGY | Facility: CLINIC | Age: 68
End: 2023-07-05
Payer: MEDICARE

## 2023-07-05 VITALS
DIASTOLIC BLOOD PRESSURE: 90 MMHG | HEIGHT: 73 IN | SYSTOLIC BLOOD PRESSURE: 142 MMHG | BODY MASS INDEX: 29.51 KG/M2 | WEIGHT: 222.69 LBS | HEART RATE: 68 BPM

## 2023-07-05 DIAGNOSIS — I10 HYPERTENSION, UNSPECIFIED TYPE: ICD-10-CM

## 2023-07-05 DIAGNOSIS — R94.31 ABNORMAL ELECTROCARDIOGRAM (ECG) (EKG): ICD-10-CM

## 2023-07-05 DIAGNOSIS — I47.29 NSVT (NONSUSTAINED VENTRICULAR TACHYCARDIA): Primary | ICD-10-CM

## 2023-07-05 PROCEDURE — 99999 PR PBB SHADOW E&M-EST. PATIENT-LVL IV: CPT | Mod: PBBFAC,,, | Performed by: INTERNAL MEDICINE

## 2023-07-05 PROCEDURE — 99214 OFFICE O/P EST MOD 30 MIN: CPT | Mod: PBBFAC,PN | Performed by: INTERNAL MEDICINE

## 2023-07-05 PROCEDURE — 99214 PR OFFICE/OUTPT VISIT, EST, LEVL IV, 30-39 MIN: ICD-10-PCS | Mod: S$PBB,,, | Performed by: INTERNAL MEDICINE

## 2023-07-05 PROCEDURE — 99999 PR PBB SHADOW E&M-EST. PATIENT-LVL IV: ICD-10-PCS | Mod: PBBFAC,,, | Performed by: INTERNAL MEDICINE

## 2023-07-05 PROCEDURE — 99214 OFFICE O/P EST MOD 30 MIN: CPT | Mod: S$PBB,,, | Performed by: INTERNAL MEDICINE

## 2023-07-05 RX ORDER — METOPROLOL SUCCINATE 50 MG/1
50 TABLET, EXTENDED RELEASE ORAL DAILY
Qty: 30 TABLET | Refills: 5 | Status: SHIPPED | OUTPATIENT
Start: 2023-07-05 | End: 2023-07-12 | Stop reason: SDUPTHER

## 2023-07-05 NOTE — PROGRESS NOTES
Subjective:    Patient ID:  Cipriano Gunderson is a 68 y.o. male patient here for evaluation Hypertension    Follow up visit 07/05/2023:  Had EP evaluation and underwent event monitor.  Denies any chest pain or shortness of breath, palpitations, syncope.  Event monitor    Patient had a min HR of 48 bpm, max HR of 156 bpm, and avg HR of 78 bpm. Predominant underlying rhythm was Sinus Rhythm. Bundle Branch Block/IVCD was present. 2 Ventricular Tachycardia runs occurred, the run with the fastest interval lasting 10 beats with a max rate of 156 bpm, the longest lasting 12.3 secs with an avg rate of 129 bpm. 385 Supraventricular Tachycardia runs occurred, the run with the fastest interval lasting 7 beats with a max rate of 141 bpm, the longest lasting 12.4 secs with an avg rate of 118 bpm. Isolated SVEs were frequent (14.4%, 201807), SVE Couplets were rare (<1.0%, 1948), and SVE Triplets were rare (<1.0%, 1669). Isolated VEs were rare (<1.0%), VE Couplets were rare (<1.0%), and no VE Triplets were present. Ventricular Bigeminy and Trigeminy were present.    Follow-up visit 03/29/2023:  Patient underwent echocardiogram and Holter monitor.  He is totally asymptomatic.   Holter monitor    1. Sinus rhythm marked sinus arrhythmias with rates of 54 bpm to 121 bpm. Mean rate 77 bpm.  2. Rare isolated multifocal PVC's averaging five an hour. 137 total beats. No couplets or non-sustained runs noted.  3. Frequent PAC's, pairs and approximately 98 episodes of SVT. Longest run of 16 beats at 11:33:01 pm. Fastest run at a rate of 124 bpm at 11:29:13 pm. The PAC's averaged 460/hour. 33860 total beats. Occasional runs of bigeminy and trigeminy detected.  4. No significant pauses.  5. No diary returned. No symptoms reported.    Echocardiogram  The left ventricle is normal in size with mild concentric hypertrophy and normal systolic function.  The estimated ejection fraction is 65%.  Normal left ventricular diastolic function.  Atrial  fibrillation not observed.  Normal right ventricular size with normal right ventricular systolic function.  Normal central venous pressure (3 mmHg).  Mean left r atrial pressure normal no pulmonary hypertension      History of Present Illness during initial visit:  67-year-old male with past medical history of diabetes, hypertension, hyperlipidemia referred here by patient's PCP for evaluation of abnormal EKG.  Patient was found to have frequent PACs on the EKG with bifascicular block.  Patient had bifascicular block on the prior EKG 2 years ago.  Patient is physically active at baseline and denies any anginal symptoms.  Patient denies any palpitations dizziness syncope.  Patient is compliant with the medications and diet.  Denies any history of heart disease in the past.   BP is usually normal at home per patient.         Review of patient's allergies indicates:  No Known Allergies    Past Medical History:   Diagnosis Date    Depression     Diabetes mellitus, type 2     GERD (gastroesophageal reflux disease)     Hyperlipidemia     Hypertension      Past Surgical History:   Procedure Laterality Date    DENTAL SURGERY  04/05/2019 5/24/2019    HERNIA REPAIR      NASAL SEPTUM SURGERY       Social History     Tobacco Use    Smoking status: Never    Smokeless tobacco: Never   Substance Use Topics    Alcohol use: Yes     Alcohol/week: 1.0 - 2.0 standard drink     Types: 1 - 2 Glasses of wine per week     Comment: weekends    Drug use: No        Review of Systems   Negative except as mentioned in HPI         Objective        Vitals:    07/05/23 1054   BP: (!) 142/90   Pulse: 68       LIPIDS - LAST 2   Lab Results   Component Value Date    CHOL 128 01/18/2023    CHOL 158 05/28/2020    HDL 54 01/18/2023    HDL 44 05/28/2020    LDLCALC 61.8 (L) 01/18/2023    LDLCALC 85.8 05/28/2020    TRIG 61 01/18/2023    TRIG 141 05/28/2020    CHOLHDL 42.2 01/18/2023    CHOLHDL 27.8 05/28/2020       CBC - LAST 2  Lab Results   Component  Value Date    WBC 0-2 08/28/2017    RBC 0-2 08/28/2017       CHEMISTRY & LIVER FUNCTION - LAST 2  Lab Results   Component Value Date     01/18/2023     09/28/2020    K 3.9 01/18/2023    K 3.9 09/28/2020     01/18/2023     09/28/2020    CO2 24 01/18/2023    CO2 22 (L) 09/28/2020    ANIONGAP 9 01/18/2023    ANIONGAP 11 09/28/2020    BUN 11 01/18/2023    BUN 15 09/28/2020    CREATININE 1.0 01/18/2023    CREATININE 1.1 09/28/2020     (H) 01/18/2023     09/28/2020    CALCIUM 9.7 01/18/2023    CALCIUM 10.3 09/28/2020    ALBUMIN 4.5 01/18/2023    ALBUMIN 4.4 09/18/2019    PROT 7.4 01/18/2023    PROT 7.5 09/18/2019    ALKPHOS 98 01/18/2023    ALKPHOS 77 09/18/2019    ALT 24 01/18/2023    ALT 29 09/18/2019    AST 28 01/18/2023    AST 28 09/18/2019    BILITOT 0.6 01/18/2023    BILITOT 0.7 09/18/2019        CARDIAC PROFILE - LAST 2  No results found for: BNP, CPK, CPKMB, LDH, TROPONINI, TROPONINIHS     COAGULATION - LAST 2  No results found for: LABPT, INR, APTT    ENDOCRINE & PSA - LAST 2  Lab Results   Component Value Date    HGBA1C 6.0 01/18/2023    HGBA1C 6.4 (H) 06/02/2021    TSH 1.640 03/30/2023    PSA 0.36 01/23/2023        ECHOCARDIOGRAM RESULTS  No results found for this or any previous visit.      CURRENT/PREVIOUS VISIT EKG  Results for orders placed or performed in visit on 02/27/23   IN OFFICE EKG 12-LEAD (to [place])    Collection Time: 02/27/23  2:59 PM    Narrative    Test Reason : R94.31,    Vent. Rate : 100 BPM     Atrial Rate : 100 BPM     P-R Int : 146 ms          QRS Dur : 122 ms      QT Int : 366 ms       P-R-T Axes : 043 -81 057 degrees     QTc Int : 472 ms    Normal sinus rhythm  Right bundle branch block  Left anterior fascicular block   Bifascicular block   Possible Anteroseptal infarct (cited on or before 24-JAN-2023)  Abnormal ECG  When compared with ECG of 24-JAN-2023 10:23,  Premature atrial complexes are no longer Present  Questionable change in initial forces of  Anterior-septal leads  T wave inversion no longer evident in Inferior leads  Inverted T waves have replaced nonspecific T wave abnormality in Anterior  leads  Confirmed by Jose Jaramillo MD ([id number]) on 3/20/2023 3:09:13 PM    Referred By: MIRANDA SERRANO           Confirmed By:Jose Jaramillo MD     No valid procedures specified.   No results found for this or any previous visit.    No valid procedures specified.          PREVIOUS STRESS TEST              PREVIOUS ANGIOGRAM        PHYSICAL EXAM    CONSTITUTIONAL: Well built, well nourished in no apparent distress  HEENT: No pallor  NECK: no JVD  LUNGS: CTA b/l  HEART: regular rate and rhythm, S1, S2 normal, no murmur   ABDOMEN: soft, non-tender; bowel sounds normal  EXTREMITIES: No edema  NEURO: AAO X 3   SKIN:  No rash  Psych:  Normal affect    I HAVE REVIEWED :    The vital signs, nurses notes, and all the pertinent radiology and labs.        Current Outpatient Medications   Medication Instructions    allopurinoL (ZYLOPRIM) 100 MG tablet No dose, route, or frequency recorded.    aspirin (ECOTRIN) 81 MG EC tablet 1 tablet, Oral, Daily    atorvastatin (LIPITOR) 10 MG tablet TAKE 1 TABLET(10 MG) BY MOUTH EVERY DAY    cetirizine (ZYRTEC) 10 mg, Oral, Daily    cilostazoL (PLETAL) 100 mg, Oral, 2 times daily, Pt states taking 1 tab daily.    diclofenac sodium (VOLTAREN) 2 g, Topical (Top), Daily    fluticasone propionate (FLONASE) 50 mcg/actuation nasal spray No dose, route, or frequency recorded.    hydrocortisone valerate (WEST-KIMANI) 0.2 % ointment 1 Dose, Daily    ibuprofen (ADVIL,MOTRIN) 800 MG tablet TAKE 1 TABLET THREE TIMES A DAY AS NEEDED FOR PAIN AND JOINT PAIN    ipratropium (ATROVENT) 0.03 % nasal spray USE 2 SPRAYS NASALLY TWICE A DAY    LIDOcaine (LIDODERM) 5 % 1 patch, Daily, PRN    lisinopriL (PRINIVIL,ZESTRIL) 20 mg, Oral, Daily, Takes half a pill only.    metFORMIN (GLUCOPHAGE) 500 mg, Oral, Daily    metoprolol succinate (TOPROL-XL) 50 mg, Oral, Daily     NIFEdipine (ADALAT CC) 60 MG TbSR TAKE 1 TABLET BY MOUTH DAILY    SYSTANE ULTRA, PF, 0.4-0.3 % Dpet No dose, route, or frequency recorded.    tadalafiL (CIALIS) 20 mg, Oral, Daily    traZODone (DESYREL) 50 MG tablet 1 tablet, Daily PRN          Assessment & Plan     07/05/2023:  He was evaluated by EP.  No sustained arrhythmias on event monitor however had frequent PACs, nonsustained SVT episodes and nonsustained VT.   Increase metoprolol succinate to 50 mg daily.  Advised to monitor heart rate and blood pressure at home.  Exercise nuclear stress test to rule out any significant coronary ischemia given nonsustained VT on event monitor.   Continue statin.  Repeat blood work with PCP including lipid profile  Normal LV function on echo this year.  Continue nifedipine and lisinopril        03/29/2023:  67-year-old male with past medical history of diabetes, hypertension, hyperlipidemia referred here by patient's PCP for evaluation of abnormal EKG.  Patient was found to have frequent PACs on the EKG with bifascicular block.  Patient had bifascicular block on the prior EKG 2 years ago.  Patient is physically active at baseline and denies any anginal symptoms.  Patient denies any palpitations dizziness syncope.  Patient is compliant with the medications and diet.  Denies any history of heart disease in the past.     Frequent PACs and brief SVT episodes on Holter monitor- started on beta-blocker.  Normal LV function on echo. Will refer to electrophysiology.  Thyroid profile pending.  Patient is completely asymptomatic.   BP elevated today however patient states it is controlled at home.  Possible white coat htn.  Continue nifedipine and lisinopril.  Home BP monitoring  Dyslipidemia controlled.  Continue atorvastatin.           Follow up in about 4 months (around 11/5/2023).

## 2023-07-06 ENCOUNTER — OFFICE VISIT (OUTPATIENT)
Dept: FAMILY MEDICINE | Facility: CLINIC | Age: 68
End: 2023-07-06
Payer: MEDICARE

## 2023-07-06 VITALS
DIASTOLIC BLOOD PRESSURE: 80 MMHG | SYSTOLIC BLOOD PRESSURE: 137 MMHG | HEART RATE: 77 BPM | BODY MASS INDEX: 29.34 KG/M2 | HEIGHT: 73 IN | WEIGHT: 221.38 LBS | OXYGEN SATURATION: 95 %

## 2023-07-06 DIAGNOSIS — E79.0 HYPERURICEMIA: ICD-10-CM

## 2023-07-06 DIAGNOSIS — G89.29 CHRONIC MIDLINE LOW BACK PAIN WITHOUT SCIATICA: ICD-10-CM

## 2023-07-06 DIAGNOSIS — M54.50 CHRONIC MIDLINE LOW BACK PAIN WITHOUT SCIATICA: ICD-10-CM

## 2023-07-06 DIAGNOSIS — I10 BENIGN ESSENTIAL HYPERTENSION: Primary | ICD-10-CM

## 2023-07-06 DIAGNOSIS — R73.9 ELEVATED BLOOD SUGAR: ICD-10-CM

## 2023-07-06 PROCEDURE — 99213 PR OFFICE/OUTPT VISIT, EST, LEVL III, 20-29 MIN: ICD-10-PCS | Mod: S$PBB,AQ,, | Performed by: INTERNAL MEDICINE

## 2023-07-06 PROCEDURE — 99215 OFFICE O/P EST HI 40 MIN: CPT | Performed by: INTERNAL MEDICINE

## 2023-07-06 PROCEDURE — 99213 OFFICE O/P EST LOW 20 MIN: CPT | Mod: S$PBB,AQ,, | Performed by: INTERNAL MEDICINE

## 2023-07-06 RX ORDER — ROSUVASTATIN CALCIUM 20 MG/1
20 TABLET, COATED ORAL NIGHTLY
COMMUNITY
Start: 2023-01-29 | End: 2023-11-28

## 2023-07-06 RX ORDER — IBUPROFEN 800 MG/1
TABLET ORAL
Qty: 90 TABLET | Refills: 4 | Status: SHIPPED | OUTPATIENT
Start: 2023-07-06 | End: 2024-03-27

## 2023-07-11 ENCOUNTER — PATIENT MESSAGE (OUTPATIENT)
Dept: CARDIOLOGY | Facility: CLINIC | Age: 68
End: 2023-07-11
Payer: MEDICARE

## 2023-07-12 RX ORDER — METOPROLOL SUCCINATE 50 MG/1
50 TABLET, EXTENDED RELEASE ORAL DAILY
Qty: 90 TABLET | Refills: 3 | Status: SHIPPED | OUTPATIENT
Start: 2023-07-12 | End: 2024-07-06

## 2023-07-26 ENCOUNTER — TELEPHONE (OUTPATIENT)
Dept: CARDIOLOGY | Facility: HOSPITAL | Age: 68
End: 2023-07-26

## 2023-07-26 NOTE — TELEPHONE ENCOUNTER
Left message on voicemail.    Patient advised, test will be at [facility] ([address]  Will need to register on the first floor at the main entrance.  Patient advised that arrival time is 7:10.  Patient advised that he may be here about 3.5-4 hours, and may want to bring something to occupy their time, as there will be periods of waiting.    Patient advised, may take his medications prior to testing if you need to.  Patient should HOLD Metoprolol but take lisinopril and Nifedipine. Advised if he needs to eat to take his medications, please keep it light, like toast and juice.    Patient advised to avoid all caffeine 12 hours prior to testing.  This includes decaf tea and coffee.    Will provide peanut butter crackers for a snack after stress test.  If patient would prefer something else, please bring a snack from home.    Wear comfortable clothing.  No lotions, oils, or powders to the upper chest area. May wear deodorant.    No metal jewelry, buttons, or zippers to the upper body.  Advised to call the office if any questions.    Will send instructions via [employer] as well.

## 2023-07-27 ENCOUNTER — HOSPITAL ENCOUNTER (OUTPATIENT)
Dept: RADIOLOGY | Facility: HOSPITAL | Age: 68
Discharge: HOME | End: 2023-07-27
Attending: INTERNAL MEDICINE
Payer: MEDICARE

## 2023-07-27 ENCOUNTER — HOSPITAL ENCOUNTER (OUTPATIENT)
Dept: CARDIOLOGY | Facility: HOSPITAL | Age: 68
Discharge: HOME | End: 2023-07-27
Attending: INTERNAL MEDICINE
Payer: MEDICARE

## 2023-07-27 DIAGNOSIS — I47.29 NSVT (NONSUSTAINED VENTRICULAR TACHYCARDIA): ICD-10-CM

## 2023-07-27 DIAGNOSIS — R94.31 ABNORMAL ELECTROCARDIOGRAM (ECG) (EKG): ICD-10-CM

## 2023-07-27 LAB
CV STRESS BASE HR: 75 BPM
DIASTOLIC BLOOD PRESSURE: 81 MMHG
EJECTION FRACTION- HIGH: 65 %
END DIASTOLIC INDEX-HIGH: 153 ML/M2
END DIASTOLIC INDEX-LOW: 93 ML/M2
END SYSTOLIC INDEX-HIGH: 71 ML/M2
END SYSTOLIC INDEX-LOW: 31 ML/M2
NUC REST DIASTOLIC VOLUME INDEX: 139
NUC REST EJECTION FRACTION: 55
NUC REST SYSTOLIC VOLUME INDEX: 62
NUC STRESS DIASTOLIC VOLUME INDEX: 133
NUC STRESS EJECTION FRACTION: 65 %
NUC STRESS SYSTOLIC VOLUME INDEX: 46
OHS CV CPX 1 MINUTE RECOVERY HEART RATE: 126 BPM
OHS CV CPX 85 PERCENT MAX PREDICTED HEART RATE MALE: 129
OHS CV CPX ESTIMATED METS: 10
OHS CV CPX MAX PREDICTED HEART RATE: 152
OHS CV CPX PATIENT IS FEMALE: 0
OHS CV CPX PATIENT IS MALE: 1
OHS CV CPX PEAK DIASTOLIC BLOOD PRESSURE: 82 MMHG
OHS CV CPX PEAK HEAR RATE: 134 BPM
OHS CV CPX PEAK RATE PRESSURE PRODUCT: NORMAL
OHS CV CPX PEAK SYSTOLIC BLOOD PRESSURE: 193 MMHG
OHS CV CPX PERCENT MAX PREDICTED HEART RATE ACHIEVED: 88
OHS CV CPX RATE PRESSURE PRODUCT PRESENTING: NORMAL
RETIRED EF AND QEF - SEE NOTES: 53 %
STRESS ECHO POST EXERCISE DUR MIN: 6 MINUTES
STRESS ECHO POST EXERCISE DUR SEC: 56 SECONDS
SYSTOLIC BLOOD PRESSURE: 149 MMHG

## 2023-07-27 PROCEDURE — 78452 NUCLEAR STRESS - CARDIOLOGY INTERPRETED (CUPID ONLY): ICD-10-PCS | Mod: 26,,, | Performed by: INTERNAL MEDICINE

## 2023-07-27 PROCEDURE — 93016 NUCLEAR STRESS - CARDIOLOGY INTERPRETED (CUPID ONLY): ICD-10-PCS | Mod: ,,, | Performed by: NURSE PRACTITIONER

## 2023-07-27 PROCEDURE — 93018 NUCLEAR STRESS - CARDIOLOGY INTERPRETED (CUPID ONLY): ICD-10-PCS | Mod: ,,, | Performed by: INTERNAL MEDICINE

## 2023-07-27 PROCEDURE — 93016 CV STRESS TEST SUPVJ ONLY: CPT | Mod: ,,, | Performed by: NURSE PRACTITIONER

## 2023-07-27 PROCEDURE — 93018 CV STRESS TEST I&R ONLY: CPT | Mod: ,,, | Performed by: INTERNAL MEDICINE

## 2023-07-27 PROCEDURE — A9502 TC99M TETROFOSMIN: HCPCS

## 2023-07-27 PROCEDURE — 78452 HT MUSCLE IMAGE SPECT MULT: CPT | Mod: 26,,, | Performed by: INTERNAL MEDICINE

## 2023-07-27 PROCEDURE — 78452 HT MUSCLE IMAGE SPECT MULT: CPT

## 2023-08-02 ENCOUNTER — TELEPHONE (OUTPATIENT)
Dept: CARDIOLOGY | Facility: CLINIC | Age: 68
End: 2023-08-02
Payer: MEDICARE

## 2023-08-02 NOTE — TELEPHONE ENCOUNTER
----- Message from Azucena Linn NP sent at 7/31/2023  1:31 PM CDT -----  Your stress test is negative for reversible ischemia. The test is about 90% accurate. This means there are no blockages that are causing a problem, ( meaning over 70% blocked.) According to this test you are getting enough blood flow to the coronary arteries. If you are not having any symptoms we can push back your appointment, and save a copay. If you are having symptoms we will be happy to see you.

## 2023-08-24 ENCOUNTER — PATIENT MESSAGE (OUTPATIENT)
Dept: FAMILY MEDICINE | Facility: CLINIC | Age: 68
End: 2023-08-24

## 2023-08-28 RX ORDER — METFORMIN HYDROCHLORIDE 500 MG/1
500 TABLET ORAL DAILY
Qty: 90 TABLET | Refills: 4 | Status: SHIPPED | OUTPATIENT
Start: 2023-08-28

## 2023-11-22 ENCOUNTER — LAB VISIT (OUTPATIENT)
Dept: LAB | Facility: HOSPITAL | Age: 68
End: 2023-11-22
Attending: INTERNAL MEDICINE
Payer: MEDICARE

## 2023-11-22 DIAGNOSIS — I10 BENIGN ESSENTIAL HYPERTENSION: ICD-10-CM

## 2023-11-22 DIAGNOSIS — R73.9 ELEVATED BLOOD SUGAR: ICD-10-CM

## 2023-11-22 DIAGNOSIS — E79.0 HYPERURICEMIA: ICD-10-CM

## 2023-11-22 LAB
ANION GAP SERPL CALC-SCNC: 10 MMOL/L (ref 8–16)
BUN SERPL-MCNC: 12 MG/DL (ref 8–23)
CALCIUM SERPL-MCNC: 10 MG/DL (ref 8.7–10.5)
CHLORIDE SERPL-SCNC: 106 MMOL/L (ref 95–110)
CO2 SERPL-SCNC: 23 MMOL/L (ref 23–29)
CREAT SERPL-MCNC: 1 MG/DL (ref 0.5–1.4)
EST. GFR  (NO RACE VARIABLE): >60 ML/MIN/1.73 M^2
ESTIMATED AVG GLUCOSE: 146 MG/DL (ref 68–131)
GLUCOSE SERPL-MCNC: 129 MG/DL (ref 70–110)
HBA1C MFR BLD: 6.7 % (ref 4.5–6.2)
POTASSIUM SERPL-SCNC: 4.1 MMOL/L (ref 3.5–5.1)
SODIUM SERPL-SCNC: 139 MMOL/L (ref 136–145)
URATE SERPL-MCNC: 7.3 MG/DL (ref 3.4–7)

## 2023-11-22 PROCEDURE — 36415 COLL VENOUS BLD VENIPUNCTURE: CPT | Performed by: INTERNAL MEDICINE

## 2023-11-22 PROCEDURE — 84550 ASSAY OF BLOOD/URIC ACID: CPT | Performed by: INTERNAL MEDICINE

## 2023-11-22 PROCEDURE — 83036 HEMOGLOBIN GLYCOSYLATED A1C: CPT | Performed by: INTERNAL MEDICINE

## 2023-11-22 PROCEDURE — 80048 BASIC METABOLIC PNL TOTAL CA: CPT | Performed by: INTERNAL MEDICINE

## 2023-11-25 NOTE — PROGRESS NOTES
Subjective:       Patient ID: Cipriano Gunderson is a 68 y.o. male.    Chief Complaint: Hypertension, Follow-up, Hyperlipidemia, Hyperuricemia, Erectile Dysfunction, Sinus Problem, and Insomnia    Patient is a 68-year-old male who comes for 6 months follow-up.    Overall health is not the best at this point since his activities are curtailed.  His wife has been somewhat ill off late for the last 6 months or more and she had frequent falls.  He had to be frequently with his wife to look after her.  She had bleed in the brain also.  Details are unknown at this point.    Underlying medical issues are as below:-    1. Benign essential hypertension - Nifedepine, Lisinopril, Metoprolol.  2. Hypercholesterolemia -rosuvastatin  3. Hyperuricemia -allopurinol.  4. Impaired fasting glucose - Metformin  5. Other male erectile dysfunction - cialis.  6. Non-seasonal allergic rhinitis due to pollen :-on Flonase and ipratropium  7.         Prescription for cilostazol noted for peripheral vascular disease.  This was based upon testing.  Not sure how his symptoms are.    8.         Sleep- Trazodone       Uric acid level is 7.3, A1c 6.7, Glucose 129, K 4.1 , Cr 1.0 EgFR >60.0            Lipid panel  in past was improved.      PSA test was also within normal range.    He recently had a fit test done which was negative.    Pneumococcal 23 vaccine has been mentioned in 2017 with no proof in our system.  He has been offered Prevnar 20 vaccine.    Allopurinol for gout prevention has been noted.      Baseline baby aspirin has been noted.      Cetirizine/Flonase/Atrovent nasal spray for sinuses.      Ibuprofen 800 mg for arthritis pains.      He continues to exercise and weight strength regularly.      //////////////////////      Past medical issues reviewed include perhaps diagnosis of mild peripheral vascular disease based upon symptoms of claudication.  Patient has been prescribed pletal.  Details of those encounters are not known to  me.  Uncertain if it really helps him.  His exercise tolerance based upon his bicycling and exercise is good regardless.      Erectile dysfunction has been noted with some degree of relief from use of Cialis.  In past he had asked for a injectable form of Cialis which I assume he was asking for testosterone.  I do not believe currently during prevalent COVID-19 issues and risk for thrombogenic events if 1 has to have infection, this would be a good idea.  Will address this issue in future.    Hypertension  This is a chronic problem. The current episode started more than 1 year ago. The problem is controlled. Pertinent negatives include no chest pain, headaches, neck pain, palpitations or shortness of breath. Risk factors for coronary artery disease include male gender and dyslipidemia. Past treatments include ACE inhibitors. The current treatment provides moderate improvement. Compliance problems include psychosocial issues.    Hyperlipidemia  This is a chronic problem. The current episode started more than 1 year ago. The problem is controlled. He has no history of obesity. Pertinent negatives include no chest pain or shortness of breath. Current antihyperlipidemic treatment includes statins. The current treatment provides moderate improvement of lipids. Compliance problems include psychosocial issues.  Risk factors for coronary artery disease include hypertension, male sex and dyslipidemia.   Sinus Problem  This is a chronic problem. The current episode started more than 1 year ago. The problem has been waxing and waning since onset. There has been no fever. Pertinent negatives include no chills, headaches, neck pain or shortness of breath. Treatments tried: Zyrtec and Atrovent. The treatment provided moderate relief.       Past Medical History:   Diagnosis Date    Depression     Diabetes mellitus, type 2     GERD (gastroesophageal reflux disease)     Hyperlipidemia     Hypertension      Social History      Socioeconomic History    Marital status:      Spouse name: Julieta    Number of children: 2   Occupational History    Occupation: [employer]   Tobacco Use    Smoking status: Never    Smokeless tobacco: Never   Substance and Sexual Activity    Alcohol use: Yes     Alcohol/week: 1.0 - 2.0 standard drink of alcohol     Types: 1 - 2 Glasses of wine per week     Comment: weekends    Drug use: No    Sexual activity: Yes     Partners: Female     Social Determinants of Health     Financial Resource Strain: Low Risk  (11/22/2023)    Overall Financial Resource Strain (CARDIA)     Difficulty of Paying Living Expenses: Not hard at all   Food Insecurity: No Food Insecurity (11/22/2023)    Hunger Vital Sign     Worried About Running Out of Food in the Last Year: Never true     Ran Out of Food in the Last Year: Never true   Transportation Needs: No Transportation Needs (11/22/2023)    PRAPARE - Transportation     Lack of Transportation (Medical): No     Lack of Transportation (Non-Medical): No   Physical Activity: Sufficiently Active (11/22/2023)    Exercise Vital Sign     Days of Exercise per Week: 3 days     Minutes of Exercise per Session: 60 min   Stress: No Stress Concern Present (11/22/2023)    [employer] [place] [employer] - Occupational Stress Questionnaire     Feeling of Stress : Not at all   Social Connections: Unknown (11/22/2023)    Social Connection and Isolation Panel [NHANES]     Frequency of Communication with Friends and Family: More than three times a week     Frequency of Social Gatherings with Friends and Family: Once a week     Active Member of Clubs or Organizations: Yes     Attends Club or Organization Meetings: More than 4 times per year     Marital Status:    Housing Stability: Low Risk  (11/22/2023)    Housing Stability Vital Sign     Unable to Pay for Housing in the Last Year: No     Number of Places Lived in the Last Year: 1     Unstable Housing in the Last Year: No     Past  "Surgical History:   Procedure Laterality Date    DENTAL SURGERY  04/05/2019 5/24/2019    HERNIA REPAIR      NASAL SEPTUM SURGERY       Family History   Problem Relation Age of Onset    Hypertension Mother     Cancer Mother         ??    Hypertension Father     Heart disease Father     Heart failure Father        Review of Systems   Constitutional:  Positive for activity change. Negative for chills, fatigue and unexpected weight change (Gained 9 lbs).   HENT:  Negative for hearing loss, rhinorrhea and trouble swallowing.    Eyes:  Negative for discharge and visual disturbance.   Respiratory:  Negative for chest tightness, shortness of breath and wheezing.    Cardiovascular:  Negative for chest pain and palpitations.   Gastrointestinal:  Negative for blood in stool, constipation, diarrhea and vomiting.   Endocrine: Positive for polyuria. Negative for polydipsia.   Genitourinary:  Negative for difficulty urinating, enuresis, hematuria and urgency.   Musculoskeletal:  Negative for arthralgias, joint swelling and neck pain.   Neurological:  Negative for weakness and headaches.   Psychiatric/Behavioral:  Negative for confusion and dysphoric mood.          Objective:      Blood pressure 130/70, pulse 95, height 6' 1" (1.854 m), weight 104.3 kg (230 lb). Body mass index is 30.34 kg/m².  Physical Exam  Constitutional:       General: He is not in acute distress.     Appearance: Normal appearance. He is not ill-appearing, toxic-appearing or diaphoretic.      Comments: BMI is 29.29 gained 13 lb of weight since the last documentation.  Attributes mostly to muscular gained.   Eyes:      General: No scleral icterus.  Cardiovascular:      Rate and Rhythm: Normal rate and regular rhythm.      Pulses: Normal pulses.      Heart sounds: Normal heart sounds.      Comments: Today the pulse is regular.  Pulmonary:      Effort: Pulmonary effort is normal.      Breath sounds: Normal breath sounds.   Abdominal:      General: There is no " distension.      Tenderness: There is no abdominal tenderness.   Musculoskeletal:      Right lower leg: No edema.      Left lower leg: No edema.   Skin:     Findings: No lesion or rash.   Neurological:      Mental Status: Mental status is at baseline.   Psychiatric:      Comments: Grave affect           Assessment:             Benign essential hypertension  -     Basic Metabolic Panel; Future; Expected date: 02/19/2024  -     Microalbumin/Creatinine Ratio, Urine; Future; Expected date: 02/19/2024    Hyperuricemia  -     Uric Acid; Future; Expected date: 02/19/2024    Elevated blood sugar  -     Hemoglobin A1C; Future; Expected date: 02/19/2024    Chronic midline low back pain without sciatica    Impaired fasting glucose    Need for vaccination  -     Influenza - Quadrivalent - High Dose (65+) (PF) (IM)    Screening for prostate cancer  -     PSA, Screening; Future; Expected date: 02/19/2024       Glucose 70 - 110 mg/dL 129 High  117 High  100 134 High         Component Ref Range & Units 6 d ago 10 mo ago 2 yr ago 3 yr ago   Uric Acid 3.4 - 7.0 mg/dL 7.3 High  6.7 5.4 7.8 High      Lab Visit on 11/22/2023   Component Date Value Ref Range Status    Sodium 11/22/2023 139  136 - 145 mmol/L Final    Potassium 11/22/2023 4.1  3.5 - 5.1 mmol/L Final    Chloride 11/22/2023 106  95 - 110 mmol/L Final    CO2 11/22/2023 23  23 - 29 mmol/L Final    Glucose 11/22/2023 129 (H)  70 - 110 mg/dL Final    BUN 11/22/2023 12  8 - 23 mg/dL Final    Creatinine 11/22/2023 1.0  0.5 - 1.4 mg/dL Final    Calcium 11/22/2023 10.0  8.7 - 10.5 mg/dL Final    Anion Gap 11/22/2023 10  8 - 16 mmol/L Final    eGFR 11/22/2023 >60.0  >60 mL/min/1.73 m^2 Final    Uric Acid 11/22/2023 7.3 (H)  3.4 - 7.0 mg/dL Final    Hemoglobin A1C 11/22/2023 6.7 (H)  4.5 - 6.2 % Final    Estimated Avg Glucose 11/22/2023 146 (H)  68 - 131 mg/dL Final   Benign essential hypertension  -     Basic Metabolic Panel; Future; Expected date: 02/19/2024  -      Microalbumin/Creatinine Ratio, Urine; Future; Expected date: 02/19/2024    Hyperuricemia  -     Uric Acid; Future; Expected date: 02/19/2024    Elevated blood sugar  -     Hemoglobin A1C; Future; Expected date: 02/19/2024    Chronic midline low back pain without sciatica    Impaired fasting glucose    Need for vaccination  -     Influenza - Quadrivalent - High Dose (65+) (PF) (IM)    Screening for prostate cancer  -     PSA, Screening; Future; Expected date: 02/19/2024           Plan:           Benign essential hypertension  -     Basic Metabolic Panel; Future; Expected date: 02/19/2024  -     Microalbumin/Creatinine Ratio, Urine; Future; Expected date: 02/19/2024    Hyperuricemia  -     Uric Acid; Future; Expected date: 02/19/2024    Elevated blood sugar  -     Hemoglobin A1C; Future; Expected date: 02/19/2024    Chronic midline low back pain without sciatica    Impaired fasting glucose    Need for vaccination  -     Influenza - Quadrivalent - High Dose (65+) (PF) (IM)    Screening for prostate cancer  -     PSA, Screening; Future; Expected date: 02/19/2024      Overall Cipriano is doing okay.  Family life has been little difficult with his wife's illness and he has been unable to pay attention to himself for the last 6 to 8 months.    He is gained some weight and understandably his blood sugars have gone up.  His uric acid level has also gone up though he has not had any attack of gout.    Blood pressures in the office tend to be little high but at home they are stable.      His medications have been reviewed.      He has been updated on the flu vaccine for this season.    I am hoping the fall season and the remaining ear and winter season will be good for him as he continues to watch his diet and incorporate some exercise perhaps at home.    Will check his numbers again next year and look forward to improvement.    He did get the flu shot and he had earlier got the COVID vaccine.    Discussed about RSV vaccine  also.      Current Outpatient Medications:     allopurinoL (ZYLOPRIM) 100 MG tablet, , Disp: , Rfl:     aspirin (ECOTRIN) 81 MG EC tablet, Take 1 tablet by mouth Daily., Disp: , Rfl:     atorvastatin (LIPITOR) 10 MG tablet, Take 10 mg by mouth once daily., Disp: , Rfl:     cetirizine (ZYRTEC) 10 MG tablet, Take 10 mg by mouth once daily., Disp: , Rfl:     cilostazoL (PLETAL) 100 MG Tab, Take 100 mg by mouth 2 (two) times daily. Pt states taking 1 tab daily., Disp: , Rfl:     diclofenac sodium (VOLTAREN) 1 % Gel, Apply 2 g topically once daily., Disp: 100 g, Rfl: 0    fluticasone propionate (FLONASE) 50 mcg/actuation nasal spray, , Disp: , Rfl:     hydrocortisone valerate (WEST-KIMANI) 0.2 % ointment, 1 Dose once daily., Disp: , Rfl:     ibuprofen (ADVIL,MOTRIN) 800 MG tablet, TAKE 1 TABLET THREE TIMES A DAY AS NEEDED FOR PAIN AND JOINT PAIN, Disp: 90 tablet, Rfl: 4    ipratropium (ATROVENT) 0.03 % nasal spray, USE 2 SPRAYS NASALLY TWICE A DAY, Disp: 90 mL, Rfl: 4    LIDOcaine (LIDODERM) 5 %, 1 patch once daily. PRN, Disp: , Rfl:     lisinopriL (PRINIVIL,ZESTRIL) 20 MG tablet, Take 1 tablet (20 mg total) by mouth once daily. Takes half a pill only. (Patient taking differently: Take 20 mg by mouth once daily. Takes half a pill only. 3/29/23 Pt states taking full tab daily.), Disp: 90 tablet, Rfl: 4    metFORMIN (GLUCOPHAGE) 500 MG tablet, Take 1 tablet (500 mg total) by mouth once daily., Disp: 90 tablet, Rfl: 4    metoprolol succinate (TOPROL-XL) 50 MG 24 hr tablet, Take 1 tablet (50 mg total) by mouth once daily., Disp: 90 tablet, Rfl: 3    NIFEdipine (ADALAT CC) 60 MG TbSR, TAKE 1 TABLET BY MOUTH DAILY, Disp: 90 tablet, Rfl: 3    SYSTANE ULTRA, PF, 0.4-0.3 % Dpet, , Disp: , Rfl:     tadalafiL (CIALIS) 20 MG Tab, Take 20 mg by mouth once daily., Disp: , Rfl:     traZODone (DESYREL) 50 MG tablet, 1 tablet daily as needed., Disp: , Rfl:

## 2023-11-28 ENCOUNTER — OFFICE VISIT (OUTPATIENT)
Dept: FAMILY MEDICINE | Facility: CLINIC | Age: 68
End: 2023-11-28
Payer: MEDICARE

## 2023-11-28 VITALS
DIASTOLIC BLOOD PRESSURE: 70 MMHG | HEART RATE: 95 BPM | WEIGHT: 230 LBS | HEIGHT: 73 IN | BODY MASS INDEX: 30.48 KG/M2 | SYSTOLIC BLOOD PRESSURE: 130 MMHG

## 2023-11-28 DIAGNOSIS — M54.50 CHRONIC MIDLINE LOW BACK PAIN WITHOUT SCIATICA: ICD-10-CM

## 2023-11-28 DIAGNOSIS — E79.0 HYPERURICEMIA: ICD-10-CM

## 2023-11-28 DIAGNOSIS — Z12.5 SCREENING FOR PROSTATE CANCER: ICD-10-CM

## 2023-11-28 DIAGNOSIS — G89.29 CHRONIC MIDLINE LOW BACK PAIN WITHOUT SCIATICA: ICD-10-CM

## 2023-11-28 DIAGNOSIS — R73.01 IMPAIRED FASTING GLUCOSE: ICD-10-CM

## 2023-11-28 DIAGNOSIS — Z23 NEED FOR VACCINATION: ICD-10-CM

## 2023-11-28 DIAGNOSIS — R73.9 ELEVATED BLOOD SUGAR: ICD-10-CM

## 2023-11-28 DIAGNOSIS — I10 BENIGN ESSENTIAL HYPERTENSION: Primary | ICD-10-CM

## 2023-11-28 PROCEDURE — 90662 IIV NO PRSV INCREASED AG IM: CPT | Mod: PBBFAC | Performed by: INTERNAL MEDICINE

## 2023-11-28 PROCEDURE — 99999PBSHW FLU VACCINE - QUADRIVALENT - HIGH DOSE (65+) PRESERVATIVE FREE IM: Mod: PBBFAC,,,

## 2023-11-28 PROCEDURE — 99999PBSHW FLU VACCINE - QUADRIVALENT - HIGH DOSE (65+) PRESERVATIVE FREE IM: ICD-10-PCS | Mod: PBBFAC,,,

## 2023-11-28 PROCEDURE — 99213 OFFICE O/P EST LOW 20 MIN: CPT | Mod: S$PBB,AQ,, | Performed by: INTERNAL MEDICINE

## 2023-11-28 PROCEDURE — 99214 OFFICE O/P EST MOD 30 MIN: CPT | Mod: 25 | Performed by: INTERNAL MEDICINE

## 2023-11-28 PROCEDURE — 99213 PR OFFICE/OUTPT VISIT, EST, LEVL III, 20-29 MIN: ICD-10-PCS | Mod: S$PBB,AQ,, | Performed by: INTERNAL MEDICINE

## 2023-11-28 RX ORDER — ATORVASTATIN CALCIUM 10 MG/1
10 TABLET, FILM COATED ORAL DAILY
COMMUNITY
End: 2023-12-19 | Stop reason: SDUPTHER

## 2023-12-18 ENCOUNTER — PATIENT MESSAGE (OUTPATIENT)
Dept: FAMILY MEDICINE | Facility: CLINIC | Age: 68
End: 2023-12-18
Payer: MEDICARE

## 2023-12-19 RX ORDER — ATORVASTATIN CALCIUM 10 MG/1
10 TABLET, FILM COATED ORAL NIGHTLY
Qty: 90 TABLET | Refills: 3 | Status: SHIPPED | OUTPATIENT
Start: 2023-12-19 | End: 2024-12-18

## 2023-12-20 DIAGNOSIS — I10 BENIGN ESSENTIAL HYPERTENSION: ICD-10-CM

## 2023-12-20 NOTE — TELEPHONE ENCOUNTER
Please see the attached refill request. Lov 11/28/23 YOUR PATIENT REQUESTED AND EXPRESSLY CONSENTED FOR THIS RX TO BE FILLED AT [facility]. THIS INFORMATION IS BASED ON A PRIOR RX CLAIM. PLEASE VERIFY CURRENT THERAPY. 90-DAY SUPPLY REQUESTED.

## 2023-12-21 RX ORDER — LISINOPRIL 20 MG/1
20 TABLET ORAL DAILY
Qty: 90 TABLET | Refills: 4 | Status: SHIPPED | OUTPATIENT
Start: 2023-12-21 | End: 2024-01-29

## 2023-12-26 ENCOUNTER — TELEPHONE (OUTPATIENT)
Dept: FAMILY MEDICINE | Facility: CLINIC | Age: 68
End: 2023-12-26
Payer: MEDICARE

## 2023-12-26 NOTE — TELEPHONE ENCOUNTER
Spoke to pt and notified him that previous refill request sent to Dr. Alberto but he is out of the office and that he will be notified once he responds.

## 2023-12-26 NOTE — TELEPHONE ENCOUNTER
----- Message from Celina Smith sent at 12/26/2023 11:30 AM CST -----  Regarding: Refills  Patient came in requesting refills for NIFEdipine (ADALAT CC) 60 MG TbSR and atorvastatin (LIPITOR) 10 MG tablet to be sent to [id number] - [place], [place] - [id number] AT [facility] [address]. States he is completely out of the medication. Please contact patient for any questions and if/when done.     Thank You

## 2023-12-27 RX ORDER — NIFEDIPINE 60 MG/1
60 TABLET, EXTENDED RELEASE ORAL DAILY
Qty: 90 TABLET | Refills: 3 | Status: SHIPPED | OUTPATIENT
Start: 2023-12-27

## 2024-01-11 DIAGNOSIS — Z00.00 ENCOUNTER FOR MEDICARE ANNUAL WELLNESS EXAM: ICD-10-CM

## 2024-01-12 ENCOUNTER — OFFICE VISIT (OUTPATIENT)
Dept: CARDIOLOGY | Facility: CLINIC | Age: 69
End: 2024-01-12
Payer: MEDICARE

## 2024-01-12 VITALS
HEART RATE: 85 BPM | HEIGHT: 73 IN | DIASTOLIC BLOOD PRESSURE: 87 MMHG | BODY MASS INDEX: 30.09 KG/M2 | SYSTOLIC BLOOD PRESSURE: 133 MMHG | WEIGHT: 227.06 LBS

## 2024-01-12 DIAGNOSIS — I47.10 SVT (SUPRAVENTRICULAR TACHYCARDIA): ICD-10-CM

## 2024-01-12 DIAGNOSIS — I49.1 PAC (PREMATURE ATRIAL CONTRACTION): Primary | ICD-10-CM

## 2024-01-12 DIAGNOSIS — E78.00 HYPERCHOLESTEROLEMIA: ICD-10-CM

## 2024-01-12 DIAGNOSIS — I10 HYPERTENSION, UNSPECIFIED TYPE: ICD-10-CM

## 2024-01-12 PROCEDURE — 99214 OFFICE O/P EST MOD 30 MIN: CPT | Mod: S$PBB,,, | Performed by: INTERNAL MEDICINE

## 2024-01-12 PROCEDURE — 99999 PR PBB SHADOW E&M-EST. PATIENT-LVL IV: CPT | Mod: PBBFAC,,, | Performed by: INTERNAL MEDICINE

## 2024-01-12 PROCEDURE — 99214 OFFICE O/P EST MOD 30 MIN: CPT | Mod: PBBFAC,PN | Performed by: INTERNAL MEDICINE

## 2024-01-12 NOTE — PROGRESS NOTES
Subjective:    Patient ID:  Cipriano Gunderson is a 68 y.o. male patient here for evaluation Hypertension    Follow up visit 01/12/2024:  Came for routine follow up.  Denies any symptoms.  Physically very active.  Compliant with the medications.  Had nuclear stress test last year.    Follow up visit 07/05/2023:  Had EP evaluation and underwent event monitor.  Denies any chest pain or shortness of breath, palpitations, syncope.  Event monitor    Patient had a min HR of 48 bpm, max HR of 156 bpm, and avg HR of 78 bpm. Predominant underlying rhythm was Sinus Rhythm. Bundle Branch Block/IVCD was present. 2 Ventricular Tachycardia runs occurred, the run with the fastest interval lasting 10 beats with a max rate of 156 bpm, the longest lasting 12.3 secs with an avg rate of 129 bpm. 385 Supraventricular Tachycardia runs occurred, the run with the fastest interval lasting 7 beats with a max rate of 141 bpm, the longest lasting 12.4 secs with an avg rate of 118 bpm. Isolated SVEs were frequent (14.4%, 201807), SVE Couplets were rare (<1.0%, 1948), and SVE Triplets were rare (<1.0%, 1669). Isolated VEs were rare (<1.0%), VE Couplets were rare (<1.0%), and no VE Triplets were present. Ventricular Bigeminy and Trigeminy were present.    Follow-up visit 03/29/2023:  Patient underwent echocardiogram and Holter monitor.  He is totally asymptomatic.   Holter monitor    1. Sinus rhythm marked sinus arrhythmias with rates of 54 bpm to 121 bpm. Mean rate 77 bpm.  2. Rare isolated multifocal PVC's averaging five an hour. 137 total beats. No couplets or non-sustained runs noted.  3. Frequent PAC's, pairs and approximately 98 episodes of SVT. Longest run of 16 beats at 11:33:01 pm. Fastest run at a rate of 124 bpm at 11:29:13 pm. The PAC's averaged 460/hour. 01313 total beats. Occasional runs of bigeminy and trigeminy detected.  4. No significant pauses.  5. No diary returned. No symptoms reported.    Echocardiogram  The left ventricle is  normal in size with mild concentric hypertrophy and normal systolic function.  The estimated ejection fraction is 65%.  Normal left ventricular diastolic function.  Atrial fibrillation not observed.  Normal right ventricular size with normal right ventricular systolic function.  Normal central venous pressure (3 mmHg).  Mean left r atrial pressure normal no pulmonary hypertension      History of Present Illness during initial visit:  67-year-old male with past medical history of diabetes, hypertension, hyperlipidemia referred here by patient's PCP for evaluation of abnormal EKG.  Patient was found to have frequent PACs on the EKG with bifascicular block.  Patient had bifascicular block on the prior EKG 2 years ago.  Patient is physically active at baseline and denies any anginal symptoms.  Patient denies any palpitations dizziness syncope.  Patient is compliant with the medications and diet.  Denies any history of heart disease in the past.   BP is usually normal at home per patient.         Review of patient's allergies indicates:  No Known Allergies    Past Medical History:   Diagnosis Date    Depression     Diabetes mellitus, type 2     GERD (gastroesophageal reflux disease)     Hyperlipidemia     Hypertension      Past Surgical History:   Procedure Laterality Date    DENTAL SURGERY  04/05/2019 5/24/2019    HERNIA REPAIR      NASAL SEPTUM SURGERY       Social History     Tobacco Use    Smoking status: Never    Smokeless tobacco: Never   Substance Use Topics    Alcohol use: Yes     Alcohol/week: 1.0 - 2.0 standard drink of alcohol     Types: 1 - 2 Glasses of wine per week     Comment: weekends    Drug use: No        Review of Systems   Negative except as mentioned in HPI         Objective        Vitals:    01/12/24 1343   BP: 133/87   Pulse: 85       LIPIDS - LAST 2   Lab Results   Component Value Date    CHOL 128 01/18/2023    CHOL 158 05/28/2020    HDL 54 01/18/2023    HDL 44 05/28/2020    LDLCALC 61.8 (L)  "01/18/2023    LDLCALC 85.8 05/28/2020    TRIG 61 01/18/2023    TRIG 141 05/28/2020    CHOLHDL 42.2 01/18/2023    CHOLHDL 27.8 05/28/2020       CBC - LAST 2  Lab Results   Component Value Date    WBC 0-2 08/28/2017    RBC 0-2 08/28/2017       CHEMISTRY & LIVER FUNCTION - LAST 2  Lab Results   Component Value Date     11/22/2023     01/18/2023    K 4.1 11/22/2023    K 3.9 01/18/2023     11/22/2023     01/18/2023    CO2 23 11/22/2023    CO2 24 01/18/2023    ANIONGAP 10 11/22/2023    ANIONGAP 9 01/18/2023    BUN 12 11/22/2023    BUN 11 01/18/2023    CREATININE 1.0 11/22/2023    CREATININE 1.0 01/18/2023     (H) 11/22/2023     (H) 01/18/2023    CALCIUM 10.0 11/22/2023    CALCIUM 9.7 01/18/2023    ALBUMIN 4.5 01/18/2023    ALBUMIN 4.4 09/18/2019    PROT 7.4 01/18/2023    PROT 7.5 09/18/2019    ALKPHOS 98 01/18/2023    ALKPHOS 77 09/18/2019    ALT 24 01/18/2023    ALT 29 09/18/2019    AST 28 01/18/2023    AST 28 09/18/2019    BILITOT 0.6 01/18/2023    BILITOT 0.7 09/18/2019        CARDIAC PROFILE - LAST 2  No results found for: "BNP", "CPK", "CPKMB", "LDH", "TROPONINI", "TROPONINIHS"     COAGULATION - LAST 2  No results found for: "LABPT", "INR", "APTT"    ENDOCRINE & PSA - LAST 2  Lab Results   Component Value Date    HGBA1C 6.7 (H) 11/22/2023    HGBA1C 6.0 01/18/2023    TSH 1.640 03/30/2023    PSA 0.36 01/23/2023        ECHOCARDIOGRAM RESULTS  No results found for this or any previous visit.      CURRENT/PREVIOUS VISIT EKG  Results for orders placed or performed in visit on 02/27/23   IN OFFICE EKG 12-LEAD (to [place])    Collection Time: 02/27/23  2:59 PM    Narrative    Test Reason : R94.31,    Vent. Rate : 100 BPM     Atrial Rate : 100 BPM     P-R Int : 146 ms          QRS Dur : 122 ms      QT Int : 366 ms       P-R-T Axes : 043 -81 057 degrees     QTc Int : 472 ms    Normal sinus rhythm  Right bundle branch block  Left anterior fascicular block   Bifascicular block   Possible " Anteroseptal infarct (cited on or before 24-JAN-2023)  Abnormal ECG  When compared with ECG of 24-JAN-2023 10:23,  Premature atrial complexes are no longer Present  Questionable change in initial forces of Anterior-septal leads  T wave inversion no longer evident in Inferior leads  Inverted T waves have replaced nonspecific T wave abnormality in Anterior  leads  Confirmed by Jose Jaramillo MD ([id number]) on 3/20/2023 3:09:13 PM    Referred By: MIRANDA SERRANO           Confirmed By:Jose Jaramillo MD     No valid procedures specified.   No results found for this or any previous visit.    No valid procedures specified.          PREVIOUS STRESS TEST              PREVIOUS ANGIOGRAM        PHYSICAL EXAM    CONSTITUTIONAL: Well built, well nourished in no apparent distress  HEENT: No pallor  NECK: no JVD  LUNGS: CTA b/l  HEART: regular rate and rhythm, S1, S2 normal, no murmur   ABDOMEN: soft  EXTREMITIES: No edema  NEURO: AAO X 3   Psych:  Normal affect    I HAVE REVIEWED :    The vital signs, nurses notes, and all the pertinent radiology and labs.        Current Outpatient Medications   Medication Instructions    allopurinoL (ZYLOPRIM) 100 MG tablet No dose, route, or frequency recorded.    aspirin (ECOTRIN) 81 MG EC tablet 1 tablet, Oral, Daily    atorvastatin (LIPITOR) 10 mg, Oral, Nightly    cetirizine (ZYRTEC) 10 mg, Oral, Daily    cilostazoL (PLETAL) 100 mg, Oral, 2 times daily, Pt states taking 1 tab daily.    diclofenac sodium (VOLTAREN) 2 g, Topical (Top), Daily    fluticasone propionate (FLONASE) 50 mcg/actuation nasal spray No dose, route, or frequency recorded.    hydrocortisone valerate (WEST-KIMANI) 0.2 % ointment 1 Dose, Daily    ibuprofen (ADVIL,MOTRIN) 800 MG tablet TAKE 1 TABLET THREE TIMES A DAY AS NEEDED FOR PAIN AND JOINT PAIN    ipratropium (ATROVENT) 0.03 % nasal spray USE 2 SPRAYS NASALLY TWICE A DAY    LIDOcaine (LIDODERM) 5 % 1 patch, Daily, PRN    lisinopriL (PRINIVIL,ZESTRIL) 20 mg, Oral, Daily     metFORMIN (GLUCOPHAGE) 500 mg, Oral, Daily    metoprolol succinate (TOPROL-XL) 50 mg, Oral, Daily    NIFEdipine (ADALAT CC) 60 mg, Oral, Daily    SYSTANE ULTRA, PF, 0.4-0.3 % Dpet No dose, route, or frequency recorded.    tadalafiL (CIALIS) 20 mg, Oral, Daily    traZODone (DESYREL) 50 MG tablet 1 tablet, Daily PRN          Assessment & Plan     67-year-old male with past medical history of diabetes, hypertension, hyperlipidemia, chronic bifascicular block, non sustained VT & SVT and frequent PACs on event monitor .   Nuclear stress test negative for ischemia  Normal LV function on last echo  Physically very active.  Denies any symptoms.  Denies palpitations.  Compliant with medications.  Continue beta-blocker.  will repeat event monitor for reassessment of ectopy  Cont nifedipine, lisinopril, statin   Advised to follow up with EP as well  Repeat Blood work with PCP  Follow up in 3 months    Follow up in about 3 months (around 4/12/2024).

## 2024-01-22 ENCOUNTER — HOSPITAL ENCOUNTER (OUTPATIENT)
Dept: CARDIOLOGY | Facility: CLINIC | Age: 69
Discharge: HOME | End: 2024-01-22
Attending: INTERNAL MEDICINE
Payer: MEDICARE

## 2024-01-22 DIAGNOSIS — I47.10 SVT (SUPRAVENTRICULAR TACHYCARDIA): ICD-10-CM

## 2024-01-22 DIAGNOSIS — I49.1 PAC (PREMATURE ATRIAL CONTRACTION): ICD-10-CM

## 2024-01-22 PROCEDURE — 93242 EXT ECG>48HR<7D RECORDING: CPT | Mod: PBBFAC

## 2024-01-22 PROCEDURE — 93244 EXT ECG>48HR<7D REV&INTERPJ: CPT | Mod: ,,, | Performed by: INTERNAL MEDICINE

## 2024-01-28 DIAGNOSIS — I10 BENIGN ESSENTIAL HYPERTENSION: ICD-10-CM

## 2024-01-29 RX ORDER — LISINOPRIL 20 MG/1
20 TABLET ORAL
Qty: 90 TABLET | Refills: 4 | Status: SHIPPED | OUTPATIENT
Start: 2024-01-29

## 2024-02-02 ENCOUNTER — TELEPHONE (OUTPATIENT)
Dept: CARDIOLOGY | Facility: CLINIC | Age: 69
End: 2024-02-02
Payer: MEDICARE

## 2024-02-02 NOTE — TELEPHONE ENCOUNTER
02/02/24    Received the final [facility] report on patient.  Showing some episodes of VT.  Sent a secured message to Dr. Ordonez to view.

## 2024-02-28 LAB
OHS CV EVENT MONITOR DAY: 4
OHS CV HOLTER HOOKUP DATE: NORMAL
OHS CV HOLTER HOOKUP TIME: NORMAL
OHS CV HOLTER LENGTH DECIMAL HOURS: 109
OHS CV HOLTER LENGTH HOURS: 13
OHS CV HOLTER LENGTH MINUTES: 0
OHS CV HOLTER SCAN DATE: NORMAL
OHS CV HOLTER SINUS AVERAGE HR: 74 BPM
OHS CV HOLTER SINUS MAX HR: 190 BPM
OHS CV HOLTER SINUS MIN HR: 47 BPM
OHS CV HOLTER STUDY END DATE: NORMAL
OHS CV HOLTER STUDY END TIME: NORMAL

## 2024-03-27 DIAGNOSIS — M54.50 CHRONIC MIDLINE LOW BACK PAIN WITHOUT SCIATICA: ICD-10-CM

## 2024-03-27 DIAGNOSIS — G89.29 CHRONIC MIDLINE LOW BACK PAIN WITHOUT SCIATICA: ICD-10-CM

## 2024-03-27 RX ORDER — IBUPROFEN 800 MG/1
TABLET ORAL
Qty: 90 TABLET | Refills: 11 | Status: SHIPPED | OUTPATIENT
Start: 2024-03-27

## 2024-04-22 PROBLEM — I47.29 NSVT (NONSUSTAINED VENTRICULAR TACHYCARDIA): Status: ACTIVE | Noted: 2024-04-22

## 2024-04-22 NOTE — PROGRESS NOTES
Subjective:     HPI    I had the pleasure of seeing Cipriano Gunderson in follow-up for his history of PACs. Last seen in 4/2023. He is a 69M with HTN, DM2, bifascicular block (since at least 2/2021 based on ECGs in the EMR), who was recently noted by his PCP to have frequent ectopy on ECG. This prompted a cardiology work-up, including a 24 hr Holter in 2/2023 which showed sinus rhythm with an average rate of 77 bpm (range  bpm), 137 PVCs, 31132 PACs (10% burden), 98 runs of nonsustained AT (longest 16 beats), no sustained arrhythmias. Pt is on toprol xl 25 mg daily, which he is tolerating.    Echo in 3/2023 showed EF 65%. Exercise SPECT in 7/2023 negative for ischemia.    Mr. Gunderson exercises on averages 3-5 days per week (weighlifting, cycling, elliptical).    At his initial visit Mr. Gunderson described occasional episodes of palpitations, difficult for him to quantify how often or how long episodes last. ECG showed sinus rhythm with frequent PACs at 75 bpm. A 5 day Zio was ordered (1/2024) and showed sinus rhythm average HR 74 bpm (range  bpm), PAC burden 16.6%, 807 episodes of nonsustained AT (longest 15.5 seconds) several episodes of irregular wide complex tachycardia likely AT with aberrancy, no sustained arrhythmias, no sx reported.    Pt now on toprol xl 50 mg daily. Palps have resolved. Still exercising regularly.    My interpretation of today's ECG is sinus rhythm with occasional PACs 81 bpm bifasciculuar block..    Review of Systems   Constitutional: Negative for decreased appetite, malaise/fatigue, weight gain and weight loss.   HENT:  Negative for sore throat.    Eyes:  Negative for blurred vision.   Cardiovascular:  Negative for chest pain, dyspnea on exertion, irregular heartbeat, leg swelling, near-syncope, orthopnea, palpitations, paroxysmal nocturnal dyspnea and syncope.   Respiratory:  Negative for shortness of breath.    Skin:  Negative for rash.   Musculoskeletal:  Negative for  arthritis.   Gastrointestinal:  Negative for abdominal pain.   Neurological:  Negative for focal weakness.   Psychiatric/Behavioral:  Negative for altered mental status.        Objective:   Physical Exam  Constitutional:       General: He is not in acute distress.     Appearance: He is well-developed.   HENT:      Head: Normocephalic and atraumatic.   Eyes:      General: No scleral icterus.     Pupils: Pupils are equal, round, and reactive to light.   Neck:      Thyroid: No thyromegaly.   Cardiovascular:      Rate and Rhythm: Regular rhythm.      Pulses: Normal pulses.      Heart sounds: Normal heart sounds. No murmur heard.     No friction rub. No gallop.   Pulmonary:      Effort: Pulmonary effort is normal.      Breath sounds: Normal breath sounds.   Abdominal:      General: Bowel sounds are normal. There is no distension.      Palpations: Abdomen is soft.      Tenderness: There is no abdominal tenderness.   Musculoskeletal:      Cervical back: Neck supple.   Skin:     General: Skin is warm and dry.      Findings: No rash.   Neurological:      Mental Status: He is alert and oriented to person, place, and time.   Psychiatric:         Behavior: Behavior normal.         Assessment:      1. NSVT (nonsustained ventricular tachycardia)    2. Peripheral arterial disease    3. Benign essential hypertension    4. Hypercholesterolemia        Plan:     In summary, Cipriano Gunderson is a 69M with frequent PACs and nonsustained AT. No symptoms or data to suggest pt is having sustained arrhythmias. Reassurance provided.     Plan is to hold the course, follow-up 1 yr.    Thank you for allowing me to participate in the care of this patient. Please do not hesitate to call me with any questions or concerns.

## 2024-04-24 ENCOUNTER — OFFICE VISIT (OUTPATIENT)
Dept: CARDIOLOGY | Facility: CLINIC | Age: 69
End: 2024-04-24
Payer: MEDICARE

## 2024-04-24 VITALS
DIASTOLIC BLOOD PRESSURE: 82 MMHG | OXYGEN SATURATION: 97 % | RESPIRATION RATE: 16 BRPM | SYSTOLIC BLOOD PRESSURE: 140 MMHG | BODY MASS INDEX: 30.99 KG/M2 | HEIGHT: 73 IN | WEIGHT: 233.81 LBS | HEART RATE: 90 BPM

## 2024-04-24 DIAGNOSIS — E78.00 HYPERCHOLESTEROLEMIA: ICD-10-CM

## 2024-04-24 DIAGNOSIS — I49.1 PREMATURE ATRIAL COMPLEXES: ICD-10-CM

## 2024-04-24 DIAGNOSIS — I47.29 NSVT (NONSUSTAINED VENTRICULAR TACHYCARDIA): Primary | ICD-10-CM

## 2024-04-24 DIAGNOSIS — I73.9 PERIPHERAL ARTERIAL DISEASE: ICD-10-CM

## 2024-04-24 DIAGNOSIS — I10 BENIGN ESSENTIAL HYPERTENSION: ICD-10-CM

## 2024-04-24 PROCEDURE — 99214 OFFICE O/P EST MOD 30 MIN: CPT | Mod: PBBFAC,PN | Performed by: INTERNAL MEDICINE

## 2024-04-24 PROCEDURE — 99999 PR PBB SHADOW E&M-EST. PATIENT-LVL IV: CPT | Mod: PBBFAC,,, | Performed by: INTERNAL MEDICINE

## 2024-04-24 PROCEDURE — 99214 OFFICE O/P EST MOD 30 MIN: CPT | Mod: S$PBB,,, | Performed by: INTERNAL MEDICINE

## 2024-04-24 PROCEDURE — 93010 ELECTROCARDIOGRAM REPORT: CPT | Mod: S$PBB,,, | Performed by: INTERNAL MEDICINE

## 2024-04-24 PROCEDURE — 93005 ELECTROCARDIOGRAM TRACING: CPT | Mod: PBBFAC,PN | Performed by: INTERNAL MEDICINE

## 2024-05-14 LAB
OHS QRS DURATION: 132 MS
OHS QTC CALCULATION: 462 MS

## 2024-06-10 ENCOUNTER — LAB VISIT (OUTPATIENT)
Dept: LAB | Facility: HOSPITAL | Age: 69
End: 2024-06-10
Attending: INTERNAL MEDICINE
Payer: MEDICARE

## 2024-06-10 DIAGNOSIS — R73.9 ELEVATED BLOOD SUGAR: ICD-10-CM

## 2024-06-10 DIAGNOSIS — I10 BENIGN ESSENTIAL HYPERTENSION: ICD-10-CM

## 2024-06-10 DIAGNOSIS — E79.0 HYPERURICEMIA: ICD-10-CM

## 2024-06-10 DIAGNOSIS — Z12.5 SCREENING FOR PROSTATE CANCER: ICD-10-CM

## 2024-06-10 LAB
ALBUMIN/CREAT UR: 17.3 UG/MG (ref 0–30)
ANION GAP SERPL CALC-SCNC: 9 MMOL/L (ref 8–16)
BUN SERPL-MCNC: 11 MG/DL (ref 8–23)
CALCIUM SERPL-MCNC: 9.7 MG/DL (ref 8.7–10.5)
CHLORIDE SERPL-SCNC: 106 MMOL/L (ref 95–110)
CO2 SERPL-SCNC: 23 MMOL/L (ref 23–29)
COMPLEXED PSA SERPL-MCNC: 0.44 NG/ML (ref 0–4)
CREAT SERPL-MCNC: 1 MG/DL (ref 0.5–1.4)
CREAT UR-MCNC: 266.9 MG/DL (ref 23–375)
EST. GFR  (NO RACE VARIABLE): >60 ML/MIN/1.73 M^2
ESTIMATED AVG GLUCOSE: 143 MG/DL (ref 68–131)
GLUCOSE SERPL-MCNC: 124 MG/DL (ref 70–110)
HBA1C MFR BLD: 6.6 % (ref 4.5–6.2)
MICROALBUMIN UR DL<=1MG/L-MCNC: 46.2 UG/ML
POTASSIUM SERPL-SCNC: 3.8 MMOL/L (ref 3.5–5.1)
SODIUM SERPL-SCNC: 138 MMOL/L (ref 136–145)
URATE SERPL-MCNC: 7.8 MG/DL (ref 3.4–7)

## 2024-06-10 PROCEDURE — 36415 COLL VENOUS BLD VENIPUNCTURE: CPT | Performed by: INTERNAL MEDICINE

## 2024-06-10 PROCEDURE — 84550 ASSAY OF BLOOD/URIC ACID: CPT | Performed by: INTERNAL MEDICINE

## 2024-06-10 PROCEDURE — 83036 HEMOGLOBIN GLYCOSYLATED A1C: CPT | Performed by: INTERNAL MEDICINE

## 2024-06-10 PROCEDURE — 80048 BASIC METABOLIC PNL TOTAL CA: CPT | Performed by: INTERNAL MEDICINE

## 2024-06-10 PROCEDURE — 82043 UR ALBUMIN QUANTITATIVE: CPT | Performed by: INTERNAL MEDICINE

## 2024-06-10 PROCEDURE — 84153 ASSAY OF PSA TOTAL: CPT | Performed by: INTERNAL MEDICINE

## 2024-06-13 ENCOUNTER — OFFICE VISIT (OUTPATIENT)
Dept: FAMILY MEDICINE | Facility: CLINIC | Age: 69
End: 2024-06-13
Payer: MEDICARE

## 2024-06-13 VITALS
DIASTOLIC BLOOD PRESSURE: 80 MMHG | BODY MASS INDEX: 30.22 KG/M2 | WEIGHT: 228 LBS | SYSTOLIC BLOOD PRESSURE: 135 MMHG | HEIGHT: 73 IN | HEART RATE: 70 BPM

## 2024-06-13 DIAGNOSIS — N52.8 OTHER MALE ERECTILE DYSFUNCTION: ICD-10-CM

## 2024-06-13 DIAGNOSIS — R73.9 ELEVATED BLOOD SUGAR: ICD-10-CM

## 2024-06-13 DIAGNOSIS — E79.0 HYPERURICEMIA: ICD-10-CM

## 2024-06-13 DIAGNOSIS — Z12.11 SCREENING FOR COLON CANCER: ICD-10-CM

## 2024-06-13 DIAGNOSIS — E78.2 MIXED DYSLIPIDEMIA: ICD-10-CM

## 2024-06-13 DIAGNOSIS — I10 BENIGN ESSENTIAL HYPERTENSION: Primary | ICD-10-CM

## 2024-06-13 DIAGNOSIS — G89.29 CHRONIC MIDLINE LOW BACK PAIN WITHOUT SCIATICA: ICD-10-CM

## 2024-06-13 DIAGNOSIS — M54.50 CHRONIC MIDLINE LOW BACK PAIN WITHOUT SCIATICA: ICD-10-CM

## 2024-06-13 PROCEDURE — 99214 OFFICE O/P EST MOD 30 MIN: CPT | Mod: PBBFAC,PN | Performed by: INTERNAL MEDICINE

## 2024-06-13 PROCEDURE — 99202 OFFICE O/P NEW SF 15 MIN: CPT | Mod: S$PBB,AQ,, | Performed by: INTERNAL MEDICINE

## 2024-06-13 PROCEDURE — 99999 PR PBB SHADOW E&M-EST. PATIENT-LVL IV: CPT | Mod: PBBFAC,,, | Performed by: INTERNAL MEDICINE

## 2024-06-13 RX ORDER — TADALAFIL 20 MG/1
20 TABLET ORAL DAILY PRN
Qty: 10 TABLET | Refills: 11 | Status: SHIPPED | OUTPATIENT
Start: 2024-06-13

## 2024-06-13 NOTE — PROGRESS NOTES
Subjective:       Patient ID: Cipriano Gunderson is a 69 y.o. male.    Chief Complaint: Hypertension, Follow-up, Hyperlipidemia, Gout, and Erectile Dysfunction    Patient is a 69-year-old male who comes for 6-9 months follow-up.    Overall health is faire to good. wife has been somewhat ill off late for the last 6 months or more and she had frequent falls.  He had to be frequently with his wife to look after her.  She had bleed in the brain also.  Details are unknown at this point.    Underlying medical issues are as below:-    1. Benign essential hypertension - Nifedepine, Lisinopril, Metoprolol.  2. Hypercholesterolemia -rosuvastatin  3. Hyperuricemia -allopurinol.  4. Impaired fasting glucose - Metformin  5. Other male erectile dysfunction - cialis.  6. Non-seasonal allergic rhinitis due to pollen :-on Flonase and ipratropium  7.         Prescription for cilostazol noted for peripheral vascular disease.  This was based upon testing.  Not sure how his symptoms are.    8.         Sleep- Trazodone       Uric acid level is 7.3, A1c 6.7, Glucose 129, K 4.1 , Cr 1.0 EgFR >60.0    He had developed a swelling in his left cheek.  We are not sure if it was a allergic reaction or insect bite but thankfully it has subsided.      Lipid panel  in past was improved.      PSA test was also within normal range.    He recently had a fit test done which was negative.  [facility].  We did discuss about it and we decide to do a colonoscopy again which might be his last 1 if it is completely normal.  If it does show polyps, then he will get a early surveillance.    Completed the pneumonia series of vaccinations.  Allopurinol for gout prevention has been noted.      Baseline baby aspirin has been noted.      Cetirizine/Flonase/Atrovent nasal spray for sinuses.      Ibuprofen 800 mg for arthritis pains.      He continues to exercise and weight strength  regularly.    Past medical issues reviewed include perhaps diagnosis of mild peripheral vascular disease based upon symptoms of claudication.  Patient has been prescribed pletal.  Details of those encounters are not known to me.  Uncertain if it really helps him.  His exercise tolerance based upon his bicycling and exercise is good regardless.      Erectile dysfunction has been noted with some degree of relief from use of Cialis.  In past he had asked for a injectable form of Cialis which I assume he was asking for testosterone.  He would like to have a prescription Hand[facility].  I do not see any contraindication to Cialis like being on nitrites at this point.    Hypertension  This is a chronic problem. The current episode started more than 1 year ago. The problem is controlled. Pertinent negatives include no chest pain, headaches, neck pain, palpitations or shortness of breath. Risk factors for coronary artery disease include male gender and dyslipidemia. Past treatments include ACE inhibitors. The current treatment provides moderate improvement. Compliance problems include psychosocial issues.    Hyperlipidemia  This is a chronic problem. The current episode started more than 1 year ago. The problem is controlled. He has no history of obesity. Pertinent negatives include no chest pain or shortness of breath. Current antihyperlipidemic treatment includes statins. The current treatment provides moderate improvement of lipids. Compliance problems include psychosocial issues.  Risk factors for coronary artery disease include hypertension, male sex and dyslipidemia.   Sinus Problem  This is a chronic problem. The current episode started more than 1 year ago. The problem has been waxing and waning since onset. There has been no fever. Associated symptoms include congestion. Pertinent negatives include no chills, headaches, neck pain or shortness of breath. Treatments tried: Zyrtec and Atrovent. The treatment provided moderate  relief.   Erectile Dysfunction  This is a chronic problem. The current episode started more than 1 year ago. The problem is unchanged. The nature of his difficulty is maintaining erection and penetration. He reports no performance anxiety. Irritative symptoms do not include urgency. Pertinent negatives include no chills or hematuria. Risk factors include hypertension.       Past Medical History:   Diagnosis Date    Depression     Diabetes mellitus, type 2     GERD (gastroesophageal reflux disease)     Hyperlipidemia     Hypertension      Social History     Socioeconomic History    Marital status:      Spouse name: Julieta    Number of children: 2   Occupational History    Occupation: [employer]   Tobacco Use    Smoking status: Never    Smokeless tobacco: Never   Substance and Sexual Activity    Alcohol use: Yes     Alcohol/week: 1.0 - 2.0 standard drink of alcohol     Types: 1 - 2 Glasses of wine per week     Comment: weekends    Drug use: No    Sexual activity: Yes     Partners: Female     Social Determinants of Health     Financial Resource Strain: Low Risk  (1/9/2024)    Overall Financial Resource Strain (CARDIA)     Difficulty of Paying Living Expenses: Not hard at all   Food Insecurity: No Food Insecurity (1/9/2024)    Hunger Vital Sign     Worried About Running Out of Food in the Last Year: Never true     Ran Out of Food in the Last Year: Never true   Transportation Needs: No Transportation Needs (1/9/2024)    PRAPARE - Transportation     Lack of Transportation (Medical): No     Lack of Transportation (Non-Medical): No   Physical Activity: Sufficiently Active (1/9/2024)    Exercise Vital Sign     Days of Exercise per Week: 3 days     Minutes of Exercise per Session: 60 min   Stress: No Stress Concern Present (1/9/2024)    [employer] [place] [employer] - Occupational Stress Questionnaire     Feeling of Stress : Not at all   Housing Stability: Low Risk  (1/9/2024)    Housing Stability Vital Sign  "    Unable to Pay for Housing in the Last Year: No     Number of Places Lived in the Last Year: 1     Unstable Housing in the Last Year: No     Past Surgical History:   Procedure Laterality Date    DENTAL SURGERY  04/05/2019 5/24/2019    HERNIA REPAIR      NASAL SEPTUM SURGERY       Family History   Problem Relation Name Age of Onset    Hypertension Mother Elizabet     Cancer Mother Elizabet         ??    Hypertension Father Allen     Heart disease Father Allen     Heart failure Father Allen        Review of Systems   Constitutional:  Negative for activity change, appetite change, chills, fever and unexpected weight change.   HENT:  Positive for congestion. Negative for hearing loss, rhinorrhea and trouble swallowing.         Chronic sinus problems  Recent swelling and inflammation in his left cheek which subsided by itself.  Not sure if it was a insect or bug bite.   Eyes:  Negative for discharge and visual disturbance.   Respiratory:  Negative for chest tightness, shortness of breath and wheezing.    Cardiovascular:  Negative for chest pain and palpitations.        Hypertension, hyperlipidemia   Gastrointestinal:  Negative for abdominal distention, abdominal pain, blood in stool, constipation, diarrhea and vomiting.   Endocrine: Negative for cold intolerance, polydipsia and polyuria.        History of gout and hyperuricemia   Genitourinary:  Negative for difficulty urinating, hematuria and urgency.        Erectile dysfunction   Musculoskeletal:  Negative for arthralgias, joint swelling and neck pain.   Neurological:  Negative for weakness and headaches.   Psychiatric/Behavioral:  Negative for confusion and dysphoric mood.          Objective:      Blood pressure 135/80, pulse 70, height 6' 1" (1.854 m), weight 103.4 kg (228 lb). Body mass index is 30.08 kg/m².  Physical Exam  Constitutional:       General: He is not in acute distress.     Appearance: Normal appearance. He is not ill-appearing, toxic-appearing or " diaphoretic.      Comments: BMI is 30.08.  Mostly muscular.   Eyes:      General: No scleral icterus.  Cardiovascular:      Rate and Rhythm: Normal rate and regular rhythm.      Pulses: Normal pulses.      Heart sounds: Normal heart sounds.      Comments: Today the pulse is regular.  Pulmonary:      Effort: Pulmonary effort is normal.      Breath sounds: Normal breath sounds.   Abdominal:      General: There is no distension.      Tenderness: There is no abdominal tenderness.   Musculoskeletal:      Right lower leg: No edema.      Left lower leg: No edema.   Skin:     Findings: No lesion or rash.   Neurological:      Mental Status: Mental status is at baseline.           Assessment:       Lab Visit on 06/10/2024   Component Date Value Ref Range Status    Hemoglobin A1C 06/10/2024 6.6 (H)  4.5 - 6.2 % Final    Estimated Avg Glucose 06/10/2024 143 (H)  68 - 131 mg/dL Final    Sodium 06/10/2024 138  136 - 145 mmol/L Final    Potassium 06/10/2024 3.8  3.5 - 5.1 mmol/L Final    Chloride 06/10/2024 106  95 - 110 mmol/L Final    CO2 06/10/2024 23  23 - 29 mmol/L Final    Glucose 06/10/2024 124 (H)  70 - 110 mg/dL Final    BUN 06/10/2024 11  8 - 23 mg/dL Final    Creatinine 06/10/2024 1.0  0.5 - 1.4 mg/dL Final    Calcium 06/10/2024 9.7  8.7 - 10.5 mg/dL Final    Anion Gap 06/10/2024 9  8 - 16 mmol/L Final    eGFR 06/10/2024 >60.0  >60 mL/min/1.73 m^2 Final    Uric Acid 06/10/2024 7.8 (H)  3.4 - 7.0 mg/dL Final    PSA, Screen 06/10/2024 0.44  0.00 - 4.00 ng/mL Final    Microalbumin, Urine 06/10/2024 46.2 (H)  <19.9 ug/mL Final    Creatinine, Urine 06/10/2024 266.9  23.0 - 375.0 mg/dL Final    Microalb/Creat Ratio 06/10/2024 17.3  0.0 - 30.0 ug/mg Final   Office Visit on 04/24/2024   Component Date Value Ref Range Status    QRS Duration 04/24/2024 132  ms Final    [facility] QTC Calculation 04/24/2024 462  ms Final       1. Benign essential hypertension  -     Comprehensive Metabolic Panel; Future; Expected date: 06/13/2024    2.  Hyperuricemia  -     Uric Acid; Future; Expected date: 12/13/2024    3. Elevated blood sugar    4. Other male erectile dysfunction  -     tadalafiL (CIALIS) 20 MG Tab; Take 1 tablet (20 mg total) by mouth daily as needed (As needed for erectile dysfunction).  Dispense: 10 tablet; Refill: 11    5. Chronic midline low back pain without sciatica    6. Mixed dyslipidemia  -     Lipid Panel; Future; Expected date: 06/14/2024  -     Comprehensive Metabolic Panel; Future; Expected date: 06/13/2024    7. Screening for colon cancer  -     Ambulatory referral/consult to Gastroenterology; Future; Expected date: 09/13/2024           Glucose 70 - 110 mg/dL 124 High  129 High  117 High  100 134 High      Component Ref Range & Units 3 d ago  (6/10/24) 6 mo ago  (11/22/23) 1 yr ago  (1/18/23) 3 yr ago  (1/26/21) 3 yr ago  (9/28/20)   Uric Acid 3.4 - 7.0 mg/dL 7.8 High  7.3 High  6.7 5.4 7.8 High      Component Ref Range & Units 3 d ago  (6/10/24) 6 mo ago  (11/22/23) 1 yr ago  (1/18/23) 3 yr ago  (6/2/21) 3 yr ago  (1/26/21) 3 yr ago  (9/28/20) 4 yr ago  (5/28/20)   Hemoglobin A1C 4.5 - 6.2 % 6.6 High  6.7 High  CM 6.0 CM 6.4 High  CM 6.0 CM 5.9 CM 6.3 High  C            Component Ref Range & Units 3 d ago 1 yr ago 4 yr ago   Microalbumin, Urine <19.9 ug/mL 46.2 High  22.1 High  20.0 R        Plan:   Benign essential hypertension  -     Comprehensive Metabolic Panel; Future; Expected date: 06/13/2024    Hyperuricemia  -     Uric Acid; Future; Expected date: 12/13/2024    Elevated blood sugar    Other male erectile dysfunction  -     tadalafiL (CIALIS) 20 MG Tab; Take 1 tablet (20 mg total) by mouth daily as needed (As needed for erectile dysfunction).  Dispense: 10 tablet; Refill: 11    Chronic midline low back pain without sciatica    Mixed dyslipidemia  -     Lipid Panel; Future; Expected date: 06/14/2024  -     Comprehensive Metabolic Panel; Future; Expected date: 06/13/2024    Screening for colon cancer  -     Ambulatory  referral/consult to Gastroenterology; Future; Expected date: 09/13/2024    Overall blood pressure is doing okay.      Elevated blood sugars have been noted as he states he was switched over to natural sugar diet with fruits and vegetables.    I have advised her reduced to half a banana history of his 1 banana with serial in the morning.    Will check A1c level next time including lipid and uric acid.  If A1c is still 6.5, will treat it as a full fledged diabetes.    Referral for colonoscopy given.    Continue back exercises.  Use of ibuprofen noted.    Follow up in about 6 months (around 12/13/2024), or if symptoms worsen or fail to improve, for Hypertension/lipids.      Current Outpatient Medications:     allopurinoL (ZYLOPRIM) 100 MG tablet, , Disp: , Rfl:     aspirin (ECOTRIN) 81 MG EC tablet, Take 1 tablet by mouth Daily., Disp: , Rfl:     atorvastatin (LIPITOR) 10 MG tablet, Take 1 tablet (10 mg total) by mouth every evening., Disp: 90 tablet, Rfl: 3    cetirizine (ZYRTEC) 10 MG tablet, Take 10 mg by mouth once daily., Disp: , Rfl:     cilostazoL (PLETAL) 100 MG Tab, Take 100 mg by mouth 2 (two) times daily. Pt states taking 1 tab daily., Disp: , Rfl:     diclofenac sodium (VOLTAREN) 1 % Gel, Apply 2 g topically once daily., Disp: 100 g, Rfl: 0    fluticasone propionate (FLONASE) 50 mcg/actuation nasal spray, , Disp: , Rfl:     hydrocortisone valerate (WEST-KIMANI) 0.2 % ointment, 1 Dose once daily., Disp: , Rfl:     ibuprofen (ADVIL,MOTRIN) 800 MG tablet, TAKE 1 TABLET THREE TIMES A DAY AS NEEDED FOR PAIN AND JOINT PAIN, Disp: 90 tablet, Rfl: 11    ipratropium (ATROVENT) 0.03 % nasal spray, USE 2 SPRAYS NASALLY TWICE A DAY, Disp: 90 mL, Rfl: 4    LIDOcaine (LIDODERM) 5 %, 1 patch once daily. PRN, Disp: , Rfl:     lisinopriL (PRINIVIL,ZESTRIL) 20 MG tablet, TAKE 1 TABLET BY MOUTH EVERY DAY, Disp: 90 tablet, Rfl: 4    metFORMIN (GLUCOPHAGE) 500 MG tablet, Take 1 tablet (500 mg total) by mouth once daily., Disp: 90  tablet, Rfl: 4    metoprolol succinate (TOPROL-XL) 50 MG 24 hr tablet, Take 1 tablet (50 mg total) by mouth once daily., Disp: 90 tablet, Rfl: 3    NIFEdipine (ADALAT CC) 60 MG TbSR, Take 1 tablet (60 mg total) by mouth once daily., Disp: 90 tablet, Rfl: 3    SYSTANE ULTRA, PF, 0.4-0.3 % Dpet, , Disp: , Rfl:     traZODone (DESYREL) 50 MG tablet, 1 tablet daily as needed., Disp: , Rfl:     tadalafiL (CIALIS) 20 MG Tab, Take 1 tablet (20 mg total) by mouth daily as needed (As needed for erectile dysfunction)., Disp: 10 tablet, Rfl: 11    Chuckie Alberto

## 2024-06-18 ENCOUNTER — PATIENT MESSAGE (OUTPATIENT)
Dept: FAMILY MEDICINE | Facility: CLINIC | Age: 69
End: 2024-06-18
Payer: MEDICARE

## 2024-06-18 ENCOUNTER — PATIENT OUTREACH (OUTPATIENT)
Dept: ADMINISTRATIVE | Facility: HOSPITAL | Age: 69
End: 2024-06-18
Payer: MEDICARE

## 2024-06-18 DIAGNOSIS — R35.1 BENIGN PROSTATIC HYPERPLASIA WITH NOCTURIA: Primary | ICD-10-CM

## 2024-06-18 DIAGNOSIS — N40.1 BENIGN PROSTATIC HYPERPLASIA WITH NOCTURIA: Primary | ICD-10-CM

## 2024-06-18 NOTE — PROGRESS NOTES
Population Health Chart Review & Patient Outreach Details      Additional [facility] Health Notes:               Updates Requested / Reviewed:      Updated Care Coordination Note, Care Everywhere, , and Immunizations Reconciliation Completed or Queried: [facility] Topics Overdue:      [facility] Score: 5     Colon Cancer Screening  Eye Exam  Lipid Panel  Foot Exam  AAA Screening    RSV Vaccine                outcomes & Actions Taken:    Colorectal Cancer Screening - Outreach Outcomes & Actions Taken  : The last colonoscopy was in 2014.

## 2024-06-20 ENCOUNTER — PATIENT MESSAGE (OUTPATIENT)
Dept: FAMILY MEDICINE | Facility: CLINIC | Age: 69
End: 2024-06-20
Payer: MEDICARE

## 2024-06-20 RX ORDER — TAMSULOSIN HYDROCHLORIDE 0.4 MG/1
0.4 CAPSULE ORAL NIGHTLY
Qty: 90 CAPSULE | Refills: 3 | Status: SHIPPED | OUTPATIENT
Start: 2024-06-20 | End: 2025-06-20

## 2024-06-20 NOTE — TELEPHONE ENCOUNTER
I have sent a prescription for tamsulosin  [facility] and 3 refills.  Please bring your records pertaining to prostate next time when you see me.  ===View-only below this line===      ----- Message -----       From:Cipriano Gunderson       Sent:6/18/2024  5:09 PM CDT         To:Chuckie Alberto    Subject:Request Medication (Flomax or Tamsulosin)     Dr. Fairchild,  During my last visit on last Thursday June 13, 2024, I forgot to discuss with you about my weak stream urinating when I awake during the night or early morning. When my prostate is relaxed during the day I have no problem and I am not experiencing any pain.  [facility] but I have not taken the medication since 2022 due to the problems seems to be corrected.  Do I need to be referred to a Urologist or can you give me a prescription for the medication?  From my past medication history, if there is a record available, you'll note I was on the medication.  Mary Cota

## 2024-06-25 DIAGNOSIS — I10 BENIGN ESSENTIAL HYPERTENSION: ICD-10-CM

## 2024-06-25 DIAGNOSIS — N52.8 OTHER MALE ERECTILE DYSFUNCTION: ICD-10-CM

## 2024-06-25 RX ORDER — METOPROLOL SUCCINATE 50 MG/1
50 TABLET, EXTENDED RELEASE ORAL
Qty: 90 TABLET | Refills: 3 | Status: SHIPPED | OUTPATIENT
Start: 2024-06-25

## 2024-06-26 RX ORDER — METFORMIN HYDROCHLORIDE 500 MG/1
500 TABLET ORAL
Qty: 90 TABLET | Refills: 4 | Status: SHIPPED | OUTPATIENT
Start: 2024-06-26 | End: 2025-09-19

## 2024-06-26 RX ORDER — TADALAFIL 20 MG/1
20 TABLET ORAL DAILY PRN
Qty: 30 TABLET | Refills: 4 | Status: SHIPPED | OUTPATIENT
Start: 2024-06-26 | End: 2025-09-19

## 2024-06-26 RX ORDER — NIFEDIPINE 60 MG/1
60 TABLET, EXTENDED RELEASE ORAL DAILY
Qty: 90 TABLET | Refills: 4 | Status: SHIPPED | OUTPATIENT
Start: 2024-06-26 | End: 2025-09-19

## 2024-06-26 RX ORDER — ATORVASTATIN CALCIUM 10 MG/1
10 TABLET, FILM COATED ORAL NIGHTLY
Qty: 90 TABLET | Refills: 4 | Status: SHIPPED | OUTPATIENT
Start: 2024-06-26 | End: 2025-09-19

## 2024-07-17 ENCOUNTER — OFFICE VISIT (OUTPATIENT)
Dept: FAMILY MEDICINE | Facility: CLINIC | Age: 69
End: 2024-07-17
Payer: MEDICARE

## 2024-07-17 VITALS
SYSTOLIC BLOOD PRESSURE: 126 MMHG | HEIGHT: 73 IN | HEART RATE: 81 BPM | OXYGEN SATURATION: 95 % | BODY MASS INDEX: 30.81 KG/M2 | TEMPERATURE: 98 F | DIASTOLIC BLOOD PRESSURE: 76 MMHG | WEIGHT: 232.5 LBS

## 2024-07-17 DIAGNOSIS — N40.0 BENIGN PROSTATIC HYPERPLASIA, PRESENCE OF LOWER URINARY TRACT SYMPTOMS UNSPECIFIED: ICD-10-CM

## 2024-07-17 DIAGNOSIS — I47.29 NSVT (NONSUSTAINED VENTRICULAR TACHYCARDIA): ICD-10-CM

## 2024-07-17 DIAGNOSIS — Z00.00 ENCOUNTER FOR MEDICARE ANNUAL WELLNESS EXAM: Primary | ICD-10-CM

## 2024-07-17 DIAGNOSIS — E66.3 OVERWEIGHT: ICD-10-CM

## 2024-07-17 DIAGNOSIS — R73.01 IMPAIRED FASTING GLUCOSE: ICD-10-CM

## 2024-07-17 DIAGNOSIS — I10 BENIGN ESSENTIAL HYPERTENSION: ICD-10-CM

## 2024-07-17 DIAGNOSIS — K21.9 GASTROESOPHAGEAL REFLUX DISEASE WITHOUT ESOPHAGITIS: ICD-10-CM

## 2024-07-17 DIAGNOSIS — Z00.00 ENCOUNTER FOR PREVENTIVE HEALTH EXAMINATION: ICD-10-CM

## 2024-07-17 DIAGNOSIS — E78.00 HYPERCHOLESTEROLEMIA: ICD-10-CM

## 2024-07-17 DIAGNOSIS — I73.9 PERIPHERAL ARTERIAL DISEASE: ICD-10-CM

## 2024-07-17 PROBLEM — Z11.59 NEED FOR HEPATITIS C SCREENING TEST: Status: RESOLVED | Noted: 2018-05-03 | Resolved: 2024-07-17

## 2024-07-17 PROBLEM — J30.1 HAY FEVER: Status: RESOLVED | Noted: 2017-07-04 | Resolved: 2024-07-17

## 2024-07-17 PROBLEM — R74.01 ELEVATED ALT MEASUREMENT: Status: RESOLVED | Noted: 2019-06-25 | Resolved: 2024-07-17

## 2024-07-17 PROBLEM — M70.21 OLECRANON BURSITIS OF RIGHT ELBOW: Status: RESOLVED | Noted: 2019-10-23 | Resolved: 2024-07-17

## 2024-07-17 PROCEDURE — 99999 PR PBB SHADOW E&M-EST. PATIENT-LVL IV: CPT | Mod: PBBFAC,,, | Performed by: PHYSICIAN ASSISTANT

## 2024-07-17 PROCEDURE — G0439 PPPS, SUBSEQ VISIT: HCPCS | Mod: ,,, | Performed by: PHYSICIAN ASSISTANT

## 2024-07-17 PROCEDURE — 99214 OFFICE O/P EST MOD 30 MIN: CPT | Mod: PBBFAC,PN | Performed by: PHYSICIAN ASSISTANT

## 2024-07-17 RX ORDER — POLYETHYLENE GLYCOL 3350, SODIUM SULFATE, POTASSIUM CHLORIDE, MAGNESIUM SULFATE, AND SODIUM CHLORIDE FOR ORAL SOLUTION 178.7-7.3G
1 KIT ORAL
COMMUNITY
Start: 2024-07-15

## 2024-07-17 NOTE — PROGRESS NOTES
"  Cipriano Gunderson presented for an initial Medicare AWV today. The following components were reviewed and updated:    Medical history  Family History  Social history  Allergies and Current Medications  Health Risk Assessment  Health Maintenance  Care Team    **See Completed Assessments for Annual Wellness visit with in the encounter summary    The following assessments were completed:  Depression Screening  Cognitive function Screening  Timed Get Up Test  Whisper Test      Opioid documentation:      Patient does not have a current opioid prescription.          Vitals:    07/17/24 0910   BP: 126/76   BP Location: Left arm   Patient Position: Sitting   BP Method: Large (Manual)   Pulse: 81   Temp: 98.1 °F (36.7 °C)   TempSrc: Oral   SpO2: 95%   Weight: 105.4 kg (232 lb 7.6 oz)   Height: 6' 1" (1.854 m)     Body mass index is 30.67 kg/m².       Physical Exam  Constitutional:       Appearance: Normal appearance.   HENT:      Head: Normocephalic and atraumatic.   Pulmonary:      Effort: Pulmonary effort is normal.      Breath sounds: Normal breath sounds.   Neurological:      General: No focal deficit present.      Mental Status: He is alert and oriented to person, place, and time. Mental status is at baseline.   Psychiatric:         Mood and Affect: Mood normal.         Behavior: Behavior normal.         Thought Content: Thought content normal.         Judgment: Judgment normal.            Diagnoses and health risks identified today and associated recommendations/orders:  Cipriano was seen today for health risk assessment.    Diagnoses and all orders for this visit:    Encounter for Medicare annual wellness exam  -     Ambulatory Referral/Consult to Enhanced Annual Wellness Visit (eAWV)    NSVT (nonsustained ventricular tachycardia)  Comments:  Stable, continue regimen    Peripheral arterial disease  Comments:  Stable, continue anticoagulation    Impaired fasting glucose  Comments:  Followed by PCP, A1c is scheduled.  Patient " not officially diagnosed with diabetes    Benign essential hypertension  Comments:  Controlled, continue current regimen    Benign prostatic hyperplasia, presence of lower urinary tract symptoms unspecified  Comments:  Stable, continue to monitor    Gastroesophageal reflux disease without esophagitis  Comments:  Controlled, continue current regimen    Hypercholesterolemia  Comments:  Controlled, continue current regimen    Overweight  Comments:  Improve slightly, continue exercise and encouraged healthy eating    Encounter for preventive health examination  Comments:  Patient wants to follow-up with PCP regarding anything with blood sugar.  He states [facility]         Provided Cipriano with a 5-10 year written screening schedule and personal prevention plan. Recommendations were developed using the USPSTF age appropriate recommendations. Education, counseling, and referrals were provided as needed.  After Visit Summary printed and given to patient which includes a list of additional screenings\tests needed.    No follow-ups on file.      LAUREN Dykes offered to discuss advanced care planning, including how to pick a person who would make decisions for you if you were unable to make them for yourself, called a health care power of , and what kind of decisions you might make such as use of life sustaining treatments such as ventilators and tube feeding when faced with a life limiting illness recorded on a living will that they will need to know. (How you want to be cared for as you near the end of your natural life)     X  Patient has advanced directives written and agrees to provide copies to the institution.

## 2024-07-17 NOTE — PATIENT INSTRUCTIONS
Counseling and Referral of Other Preventative  (Italic type indicates deductible and co-insurance are waived)    Patient Name: Cipriano Gunderson  Today's Date: 7/17/2024    Health Maintenance       Date Due Completion Date    Foot Exam Never done ---    RSV Vaccine (Age 60+ and Pregnant patients) (1 - 1-dose 60+ series) Never done ---    Abdominal Aortic Aneurysm Screening Never done ---    Eye Exam 10/21/2021 10/21/2020    Override on 10/2/2017: Done    Override on 8/31/2016: Done    Lipid Panel 01/18/2024 1/18/2023    COVID-19 Vaccine (7 - 2023-24 season) 02/26/2024 10/26/2023    Colorectal Cancer Screening 07/29/2024 (Originally 1955) 1/17/2023    Influenza Vaccine (1) 09/01/2024 11/28/2023    TETANUS VACCINE 11/28/2024 11/28/2014    Hemoglobin A1c 12/10/2024 6/10/2024    PROSTATE-SPECIFIC ANTIGEN 06/10/2025 6/10/2024    Diabetes Urine Screening 06/10/2025 6/10/2024    High Dose Statin 06/26/2025 6/26/2024        No orders of the defined types were placed in this encounter.      The following information is provided to all patients.  This information is to help you find resources for any of the problems found today that may be affecting your health:                  Living healthy guide: www.[facility].[place].gov      Understanding Diabetes: www.diabetes.org      Eating healthy: www.cdc.gov/healthyweight      CDC home safety checklist: www.cdc.gov/steadi/patient.html      Agency on Aging: www.goea.[place].gov      Alcoholics anonymous (AA): www.aa.org      Physical Activity: www.mariia.nih.gov/mq0iaei      Tobacco use: www.quitwithusla.org

## 2024-07-29 LAB — CRC RECOMMENDATION EXT: NORMAL

## 2024-08-01 ENCOUNTER — PATIENT OUTREACH (OUTPATIENT)
Dept: ADMINISTRATIVE | Facility: HOSPITAL | Age: 69
End: 2024-08-01
Payer: MEDICARE

## 2024-08-01 NOTE — LETTER
AUTHORIZATION FOR RELEASE OF   CONFIDENTIAL INFORMATION    [facility],    We are seeing Cipriano Gunderson, date of birth 1955, in the clinic at [facility] [address] [facility]. Chuckie Alberto MD is the patient's PCP. Cipriano Gunderson has an outstanding lab/procedure at the time we reviewed his chart. In order to help keep his health information updated, he has authorized us to request the following medical record(s):        (  x)  DILATED EYE EXAM                                   Please fax records to [facility], Raina, Sanjay, MD, [phone]   If you have any questions, please contact       Cristin Ramirez  Nurse Clinical Care Coordinator  [facility]  Phone: [phone]  Fax: [phone]    Patient Name: Cipriano Gunderson  : 1955  Patient Phone #: [phone]              Cipriano Gunderson  MRN: [id number]  : 1955  Age: 69 y.o.  Sex: male         Patient/Legal Guardian Signature  This signature was collected at 2024           _______________________________   Printed Name/Relationship to Patient      Consent for Examination and Treatment: I hereby authorize the providers and employees of [facility] ([facility]) to provide medical treatment/services which includes, but is not limited to, performing and administering tests and diagnostic procedures that are deemed necessary, including, but not limited to, imaging examinations, blood tests and other laboratory procedures as may be required by the hospital, clinic, or may be ordered by my physician(s) or persons working under the general and/or special instructions of my physician(s).      I understand and agree that this consent covers all authorized persons, including but not limited to physicians, residents, nurse practitioners, physicians' assistants, specialists, consultants, student nurses, and independently contracted physicians, who are called upon by the physician in  charge, to carry out the diagnostic procedures and medical or surgical treatment.     I hereby authorize [facility] to retain or dispose of any specimens or tissue, should there be such remaining from any test or procedure.     I hereby authorize and give consent for [facility] providers and employees to take photographs, images or videotapes of such diagnostic, surgical or treatment procedures of Patient as may be required by [facility] or as may be ordered by a physician. I further acknowledge and agree that [facility] may use cameras or other devices for patient monitoring.     I am aware that the practice of medicine is not an exact science, and I acknowledge that no guarantees have been made to me as to the outcome of any tests, procedures or treatment.     Authorization for Release of Information: I understand that my insurance company and/or their agents may need information necessary to make determinations about payment/reimbursement. I hereby provide authorization to release to all insurance companies, their successors, assignees, other parties with whom they may have contracted, or others acting on their behalf, that are involved with payment for any hospital and/or clinic charges incurred by the patient, any information that they request and deem necessary for payment/reimbursement, and/or quality review.  I further authorize the release of my health information to physicians or other health care practitioners on staff who are involved in my health care now and in the future, and to other health care providers, entities, or institutions for the purpose of my continued care and treatment, including referrals.     REGISTRATION AUTHORIZATION  Form No. 00714 (Rev. 3/25/2024)    Page 1 of 3                       Medicare Patient's Certification and Authorization to Release Information and Payment Request:  I certify that the information given by me in applying for payment under Title XVIII of the Social Security Act is  correct. I authorize any grijalva of medical or other information about me to release to the [facility], or its intermediaries or carriers, any information needed for this or a related Medicare claim. I request that payment of authorized benefits be made on my behalf.     Assignment of Insurance Benefits:   I hereby authorize any and all insurance companies, health plans, defined   benefit plans, health insurers or any entity that is or may be responsible for payment of my medical expenses to pay all hospital and medical benefits now due, and to become due and payable to me under any hospital benefits, sick benefits, injury benefits or any other benefit for services rendered to me, including Major Medical Benefits, direct to [facility] and all independently contracted physicians. I assign any and all rights that I may have against any and all insurance companies, health plans, defined benefit plans, health insurers or any entity that is or may be responsible for payment of my medical expenses, including, but not limited to any right to appeal a denial of a claim, any right to bring any action, lawsuit, administrative proceeding, or other cause of action on my behalf. I specifically assign my right to pursue litigation against any and all insurance companies, health plans, defined benefit plans, health insurers or any entity that is or may be responsible for payment of my medical expenses based upon a refusal to pay charges.            E. Valuables: It is understood and agreed that [facility] is not liable for the damage to or loss of any money, jewelry,   documents, dentures, eye glasses, hearing aids, prosthetics, or other property of value.     F. Computer Equipment: I understand and agree that should I choose to use computer equipment owned by [facility] or if I choose to access the Internet via [facility]s network, I do so at my own risk. [facility] is not responsible for any damage to my computer equipment  or to any damages of any type that might arise from my loss of equipment or data.     G. Acceptance of Financial Responsibility:  I agree that in consideration of the services and   supplies that have been   or will be furnished to the patient, I am hereby obligated to pay all charges made for or on the account of the patient according to the standard rates (in effect at the time the services and supplies are delivered) established by [facility], including its Patient Financial Assistance Policy to the extent it is applicable. I understand that I am responsible for all charges, or portions thereof, not covered by insurance or other sources. Patient refunds will be distributed only after balances at all [facility] facilities are paid.     H. Communication Authorization:  I hereby authorize [facility] and its representatives, along with any billing service   or  who may work on their behalf, to contact me on   my cell phone and/or home phone using pre- recorded messages, artificial voice messages, automatic telephone dialing devices or other computer assisted technology, or by electronic      mail, text messaging, or by any other form of electronic communication. This includes, but is not limited to, appointment reminders, yearly physical exam reminders, preventive care reminders, patient campaigns, welcome calls, and calls about account balances on my account or any account on which I am listed as a guarantor. I understand I have the right to opt out of these communications at any time.      Relationship  Between  Facility and  Provider:      I understand that some, but not all, providers furnishing services to the patient are not employees or agents of [facility]. The patient is under the care and supervision of his/her attending physician, and it is the responsibility of the facility and its nursing staff to carry out the instructions of such physicians. It is the responsibility of the patient's  physician/designee to obtain the patient's informed consent, when required, for medical or surgical treatment, special diagnostic or therapeutic procedures, or hospital services rendered for the patient under the special instructions of the physician/designee.           REGISTRATION AUTHORIZATION  Form No. 29077 (Rev. 3/25/2024)    Page 2 of 3                       Immunizations: [facility] shares immunization information with state sponsored health departments to help you and your doctor keep track of your immunization records. By signing, you consent to have this information shared with the health department in your state:                                [place] - LINKS ([place] Immunization Network for Kids Statewide)                                [place] - MIIX ([place] Immunization Information eXchange)                                [place] - ImmPRINT (Immunization Patient Registry with Integrated Technology)     TERM: This authorization is valid for this and subsequent care/treatment I receive at [facility] and will remain valid unless/until revoked in writing by me.     [facility]: As used in this document, [facility] means all [facility] owned and managed facilities, including, but not limited to, all health centers, surgery centers, clinics, urgent care centers, and hospitals.         [facility] complies with applicable Federal civil rights laws and does not discriminate on the basis of race, color, national origin, age, disability, or sex.  ATENCIÓN: si habla luis, tiene a miranda disposición servicios gratuitos de asistencia lingüística. Dimitri al [phone].  CHÚ Ý: N?u b?n nói Ti?ng Vi?t, có các d?ch v? h? tr? ngôn ng? mi?n phí dành cho b?n. G?i s? [phone].        REGISTRATION AUTHORIZATION  Form No. 72648 (Rev. 3/25/2024)   Page 3 of 3     Patient

## 2024-08-01 NOTE — PROGRESS NOTES
Population Health Chart Review & Patient Outreach Details      Additional Pop Health Notes:      [facility]         Updates Requested / Reviewed:      Updated Care Coordination Note, Care Everywhere, , External Sources: Provation, Care Team Updated, and Immunizations Reconciliation Completed or Queried: [facility] Topics Overdue:      [facility] Score: 4     Eye Exam  Lipid Panel  Foot Exam  AAA Screening    RSV Vaccine                  Health Maintenance Topic(s) Outreach Outcomes & Actions Taken:    Colorectal Cancer Screening - Outreach Outcomes & Actions Taken  : External Records Uploaded, Care Team Updated, & History Updated if Applicable

## 2024-10-28 ENCOUNTER — TELEPHONE (OUTPATIENT)
Dept: FAMILY MEDICINE | Facility: CLINIC | Age: 69
End: 2024-10-28
Payer: MEDICARE

## 2024-10-28 ENCOUNTER — PATIENT MESSAGE (OUTPATIENT)
Dept: FAMILY MEDICINE | Facility: CLINIC | Age: 69
End: 2024-10-28
Payer: MEDICARE

## 2024-10-28 DIAGNOSIS — R79.89 LOW TESTOSTERONE: Primary | ICD-10-CM

## 2024-11-04 ENCOUNTER — LAB VISIT (OUTPATIENT)
Dept: LAB | Facility: HOSPITAL | Age: 69
End: 2024-11-04
Attending: INTERNAL MEDICINE
Payer: MEDICARE

## 2024-11-04 DIAGNOSIS — I10 BENIGN ESSENTIAL HYPERTENSION: ICD-10-CM

## 2024-11-04 DIAGNOSIS — R79.89 LOW TESTOSTERONE: ICD-10-CM

## 2024-11-04 DIAGNOSIS — E78.2 MIXED DYSLIPIDEMIA: ICD-10-CM

## 2024-11-04 LAB
ALBUMIN SERPL BCP-MCNC: 4.4 G/DL (ref 3.5–5.2)
ALP SERPL-CCNC: 99 U/L (ref 55–135)
ALT SERPL W/O P-5'-P-CCNC: 24 U/L (ref 10–44)
ANION GAP SERPL CALC-SCNC: 4 MMOL/L (ref 8–16)
AST SERPL-CCNC: 24 U/L (ref 10–40)
BILIRUB SERPL-MCNC: 0.5 MG/DL (ref 0.1–1)
BUN SERPL-MCNC: 9 MG/DL (ref 8–23)
CALCIUM SERPL-MCNC: 9.8 MG/DL (ref 8.7–10.5)
CHLORIDE SERPL-SCNC: 106 MMOL/L (ref 95–110)
CHOLEST SERPL-MCNC: 147 MG/DL (ref 120–199)
CHOLEST/HDLC SERPL: 3.1 {RATIO} (ref 2–5)
CO2 SERPL-SCNC: 28 MMOL/L (ref 23–29)
CREAT SERPL-MCNC: 0.9 MG/DL (ref 0.5–1.4)
EST. GFR  (NO RACE VARIABLE): >60 ML/MIN/1.73 M^2
GLUCOSE SERPL-MCNC: 118 MG/DL (ref 70–110)
HDLC SERPL-MCNC: 48 MG/DL (ref 40–75)
HDLC SERPL: 32.7 % (ref 20–50)
LDLC SERPL CALC-MCNC: 78.6 MG/DL (ref 63–159)
NONHDLC SERPL-MCNC: 99 MG/DL
POTASSIUM SERPL-SCNC: 4.3 MMOL/L (ref 3.5–5.1)
PROT SERPL-MCNC: 7.2 G/DL (ref 6–8.4)
SODIUM SERPL-SCNC: 138 MMOL/L (ref 136–145)
TRIGL SERPL-MCNC: 102 MG/DL (ref 30–150)
URATE SERPL-MCNC: 6.6 MG/DL (ref 3.4–7)

## 2024-11-04 PROCEDURE — 84403 ASSAY OF TOTAL TESTOSTERONE: CPT | Performed by: INTERNAL MEDICINE

## 2024-11-04 PROCEDURE — 84550 ASSAY OF BLOOD/URIC ACID: CPT | Performed by: INTERNAL MEDICINE

## 2024-11-04 PROCEDURE — 36415 COLL VENOUS BLD VENIPUNCTURE: CPT | Performed by: INTERNAL MEDICINE

## 2024-11-04 PROCEDURE — 80053 COMPREHEN METABOLIC PANEL: CPT | Performed by: INTERNAL MEDICINE

## 2024-11-04 PROCEDURE — 80061 LIPID PANEL: CPT | Performed by: INTERNAL MEDICINE

## 2024-11-05 LAB — TESTOST SERPL-MCNC: 427 NG/DL (ref 264–916)

## 2024-11-20 ENCOUNTER — OFFICE VISIT (OUTPATIENT)
Dept: FAMILY MEDICINE | Facility: CLINIC | Age: 69
End: 2024-11-20
Payer: MEDICARE

## 2024-11-20 VITALS
OXYGEN SATURATION: 97 % | BODY MASS INDEX: 30.39 KG/M2 | DIASTOLIC BLOOD PRESSURE: 84 MMHG | WEIGHT: 229.31 LBS | HEIGHT: 73 IN | SYSTOLIC BLOOD PRESSURE: 110 MMHG | HEART RATE: 78 BPM | TEMPERATURE: 99 F

## 2024-11-20 DIAGNOSIS — S09.93XA INJURY OF LIP, INITIAL ENCOUNTER: Primary | ICD-10-CM

## 2024-11-20 PROCEDURE — 99213 OFFICE O/P EST LOW 20 MIN: CPT | Mod: S$PBB,,, | Performed by: NURSE PRACTITIONER

## 2024-11-20 PROCEDURE — 99213 OFFICE O/P EST LOW 20 MIN: CPT | Mod: PBBFAC,PN | Performed by: NURSE PRACTITIONER

## 2024-11-20 PROCEDURE — 99999 PR PBB SHADOW E&M-EST. PATIENT-LVL III: CPT | Mod: PBBFAC,,, | Performed by: NURSE PRACTITIONER

## 2024-11-20 RX ORDER — CHLORHEXIDINE GLUCONATE ORAL RINSE 1.2 MG/ML
SOLUTION DENTAL
COMMUNITY
Start: 2024-09-09

## 2024-11-20 NOTE — PROGRESS NOTES
SUBJECTIVE:      Patient ID: Cipriano Gunderson is a 69 y.o. male.    Chief Complaint: lip scar (Onset a month ago. He states that it was swollen last night and it scared him. )    History of Present Illness    CHIEF COMPLAINT:  Mr. Gunderson presents with concern about a persistent black spot on his lower lip that appeared after biting it about a month ago.    HPI:  Mr. Gunderson reports biting his lower lip approximately 1 month ago, resulting in a black spot. Swelling occurs only in the area with a small scar, which was more pronounced last night but had decreased by morning. Mr. Gunderson denies swelling of the entire lip. When initially bitten, the lip did not sustain significant injury or bleeding. Mr. Gunderson reports no pain associated with the spot.    The size of the black spot has reduced since its original appearance, but a small spot remains. Mr. Gunderson has attempted various treatments including blister treatment and Vaseline. He denies any oral swelling or difficulty breathing associated with the spot. Mr. Gunderson affirms that there was no pre-existing black line or abnormality in that area before the lip injury.    Mr. Gunderson [facility].    MEDICATIONS:  Mr. Gunderson has been on Lisinopril for many years, since around 1995. The reason for this medication is not specified. Mr. Gunderson experiences cough as a side effect of Lisinopril.    SOCIAL HISTORY:  Mr. 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 His role involved assessing  preparedness as part of his duties at the [employer].        Review of Systems   Constitutional:  Negative for activity change, appetite change, chills, diaphoresis, fatigue, fever and unexpected weight change.   HENT:  Negative for congestion, ear pain, sinus pressure, sore throat, trouble swallowing and voice change.    Eyes:  Negative for pain,  discharge and visual disturbance.   Respiratory:  Negative for cough, chest tightness, shortness of breath and wheezing.    Cardiovascular:  Negative for chest pain and palpitations.   Gastrointestinal:  Negative for abdominal pain, constipation, diarrhea, nausea and vomiting.   Genitourinary:  Negative for difficulty urinating, flank pain, frequency and urgency.   Musculoskeletal:  Negative for back pain and joint swelling.   Skin:  Negative for color change and rash.   Neurological:  Negative for dizziness, seizures, syncope, weakness, numbness and headaches.   Hematological:  Negative for adenopathy.   Psychiatric/Behavioral:  Negative for dysphoric mood and sleep disturbance. The patient is not nervous/anxious.        Family History   Problem Relation Name Age of Onset    Hypertension Mother Elizabet     Cancer Mother Elizabet         ??    Hypertension Father Allen     Heart disease Father Allen     Heart failure Father Allen     No Known Problems Sister      No Known Problems Sister      No Known Problems Daughter        Social History     Socioeconomic History    Marital status:      Spouse name: Julieta    Number of children: 2   Occupational History    Occupation: [employer]   Tobacco Use    Smoking status: Former     Current packs/day: 0.00     Types: Cigarettes     Quit date: 1994     Years since quittin.2    Smokeless tobacco: Never   Substance and Sexual Activity    Alcohol use: Yes     Alcohol/week: 4.0 - 5.0 standard drinks of alcohol     Types: 1 - 2 Glasses of wine, 3 Cans of beer per week     Comment: weekends    Drug use: No    Sexual activity: Yes     Partners: Female     Birth control/protection: None     Social Drivers of Health     Financial Resource Strain: Low Risk  (2024)    Overall Financial Resource Strain (CARDIA)     Difficulty of Paying Living Expenses: Not hard at all   Food Insecurity: No Food Insecurity (2024)    Hunger Vital Sign     Worried About Running Out  of Food in the Last Year: Never true     Ran Out of Food in the Last Year: Never true   Transportation Needs: No Transportation Needs (7/17/2024)    PRAPARE - Transportation     Lack of Transportation (Medical): No     Lack of Transportation (Non-Medical): No   Physical Activity: Sufficiently Active (7/17/2024)    Exercise Vital Sign     Days of Exercise per Week: 3 days     Minutes of Exercise per Session: 60 min   Stress: No Stress Concern Present (7/17/2024)    [employer] - Occupational Stress Questionnaire     Feeling of Stress : Not at all   Housing Stability: Low Risk  (7/17/2024)    Housing Stability Vital Sign     Unable to Pay for Housing in the Last Year: No     Homeless in the Last Year: No     Current Outpatient Medications   Medication Sig Dispense Refill    allopurinoL (ZYLOPRIM) 100 MG tablet       aspirin (ECOTRIN) 81 MG EC tablet Take 1 tablet by mouth Daily.      atorvastatin (LIPITOR) 10 MG tablet Take 1 tablet (10 mg total) by mouth every evening. 90 tablet 4    cetirizine (ZYRTEC) 10 MG tablet Take 10 mg by mouth once daily.      chlorhexidine (PERIDEX) 0.12 % solution SMARTSIG:By Mouth      cilostazoL (PLETAL) 100 MG Tab Take 100 mg by mouth 2 (two) times daily. Pt states taking 1 tab daily.      diclofenac sodium (VOLTAREN) 1 % Gel Apply 2 g topically once daily. 100 g 0    fluticasone propionate (FLONASE) 50 mcg/actuation nasal spray       hydrocortisone valerate (WEST-KIMANI) 0.2 % ointment 1 Dose once daily.      ibuprofen (ADVIL,MOTRIN) 800 MG tablet TAKE 1 TABLET THREE TIMES A DAY AS NEEDED FOR PAIN AND JOINT PAIN 90 tablet 11    ipratropium (ATROVENT) 0.03 % nasal spray USE 2 SPRAYS NASALLY TWICE A DAY 90 mL 4    LIDOcaine (LIDODERM) 5 % 1 patch once daily. PRN      lisinopriL (PRINIVIL,ZESTRIL) 20 MG tablet TAKE 1 TABLET BY MOUTH EVERY DAY 90 tablet 4    metFORMIN (GLUCOPHAGE) 500 MG tablet Take 1 tablet (500 mg total) by mouth daily with breakfast. 90 tablet  "4    metoprolol succinate (TOPROL-XL) 50 MG 24 hr tablet TAKE 1 TABLET DAILY 90 tablet 3    NIFEdipine (ADALAT CC) 60 MG TbSR Take 1 tablet (60 mg total) by mouth once daily. 90 tablet 4    SUFLAVE 178.7-7.3-0.5 gram SolR 1 Bottle.      SYSTANE ULTRA, PF, 0.4-0.3 % Dpet       tadalafiL (CIALIS) 20 MG Tab Take 1 tablet (20 mg total) by mouth daily as needed (for eectiel dysfunction). Please do Not exceed more than 1 pill a day 30 tablet 4    tamsulosin (FLOMAX) 0.4 mg Cap Take 1 capsule (0.4 mg total) by mouth every evening. 90 capsule 3    traZODone (DESYREL) 50 MG tablet 1 tablet daily as needed.       No current facility-administered medications for this visit.     Review of patient's allergies indicates:  No Known Allergies   Past Medical History:   Diagnosis Date    Depression     Diabetes mellitus, type 2     GERD (gastroesophageal reflux disease)     Hyperlipidemia     Hypertension     Olecranon bursitis of right elbow 10/23/2019    Personal history of colonic polyps 07/29/2024     Past Surgical History:   Procedure Laterality Date    COLONOSCOPY  07/29/2024    The patient will repeat the colonoscopy in 5 yrs.    DENTAL SURGERY  04/05/2019 5/24/2019    HERNIA REPAIR      NASAL SEPTUM SURGERY            OBJECTIVE:      Vitals:    11/20/24 1622   BP: 110/84   BP Location: Left arm   Patient Position: Sitting   Pulse: 78   Temp: 98.5 °F (36.9 °C)   TempSrc: Oral   SpO2: 97%   Weight: 104 kg (229 lb 4.8 oz)   Height: 6' 1" (1.854 m)     Physical Exam  Vitals and nursing note reviewed.   Constitutional:       General: He is awake. He is not in acute distress.     Appearance: He is well-developed and well-groomed. He is obese. He is not ill-appearing, toxic-appearing or diaphoretic.   HENT:      Head: Normocephalic and atraumatic.      Nose: Nose normal.      Mouth/Throat:      Mouth: No angioedema.        Comments: Darkened pigmentation to right lower lip. There is no laceration, wound, drainage, or " swelling.  Eyes:      General: Lids are normal. Gaze aligned appropriately.      Conjunctiva/sclera: Conjunctivae normal.      Right eye: Right conjunctiva is not injected.      Left eye: Left conjunctiva is not injected.      Pupils: Pupils are equal, round, and reactive to light.   Cardiovascular:      Rate and Rhythm: Normal rate and regular rhythm.      Pulses: Normal pulses.      Heart sounds: Normal heart sounds, S1 normal and S2 normal. No murmur heard.     No friction rub. No gallop.   Pulmonary:      Effort: Pulmonary effort is normal. No respiratory distress.      Breath sounds: Normal breath sounds. No stridor. No decreased breath sounds, wheezing, rhonchi or rales.   Chest:      Chest wall: No tenderness.   Musculoskeletal:      Cervical back: Neck supple.      Right lower leg: No edema.      Left lower leg: No edema.   Lymphadenopathy:      Cervical: No cervical adenopathy.   Skin:     General: Skin is warm and dry.      Capillary Refill: Capillary refill takes less than 2 seconds.      Findings: No erythema or rash.   Neurological:      Mental Status: He is alert and oriented to person, place, and time. Mental status is at baseline.   Psychiatric:         Attention and Perception: Attention normal.         Mood and Affect: Mood normal.         Speech: Speech normal.         Behavior: Behavior normal. Behavior is cooperative.         Thought Content: Thought content normal.         Judgment: Judgment normal.            Assessment:       1. Injury of lip, initial encounter        Plan:       Assessment & Plan    Assessed patient's lip concern: black spot present for about 1 month, initially from a bite, now reduced in size  Considered possibility of trapped blood under healed tissue, but unusual for prolonged persistence  Evaluated for potential angioedema due to lisinopril use, ruled out based on presentation    ESSENTIAL HYPERTENSION:  - Explained potential side effects of lisinopril, including lip and  tongue swelling (angioedema).  - Discussed that angioedema can occur at any time, even after long-term use of the medication.  - Continued lisinopril at current dose.    OTHER MELANIN HYPERPIGMENTATION:  - Mr. Gunderson to monitor lip spot for changes in pigmentation, size, or shape.  - [facility]  - Contact the office if lip spot changes in size, shape, or pigmentation.        Injury of lip, initial encounter    I spent a total of 15 minutes on the day of the visit.This includes face to face time and non-face to face time preparing to see the patient (eg, review of tests), obtaining and/or reviewing separately obtained history, documenting clinical information in the electronic or other health record, independently interpreting results and communicating results to the patient/family/caregiver, or care coordinator.    Follow up if symptoms worsen or fail to improve.          11/20/2024 NEYDA Cardoza, KAILEY    This note was generated with the assistance of ambient listening technology. Verbal consent was obtained by the patient and accompanying visitor(s) for the recording of patient appointment to facilitate this note. I attest to having reviewed and edited the generated note for accuracy, though some syntax or spelling errors may persist. Please contact the author of this note for any clarification.

## 2024-12-02 NOTE — PROGRESS NOTES
Subjective:       Patient ID: Cipriano Gunderson is a 69 y.o. male.    Chief Complaint: Hypertension, Labs Only, Hyperlipidemia, and Prostate Problem    History of Present Illness    CHIEF COMPLAINT:  Cipriano presents for a routine follow-up visit to discuss recent lab results and general health status.    HPI:  Cipriano reports home blood pressure readings typically in the 120s/70s, with diastolic occasionally dropping into the 60s. He denies recent gout attacks. He has urinary symptoms, specifically post-micturition dribbling in the morning, which he attributes to prostatic enlargement. Urinary flow improves with prostate relaxation. Home blood sugar readings have been around 110-112 mg/dL. He acknowledges weight gain since last visit. Cipriano uses Cialis as needed for erectile dysfunction.    Cipriano denies chest pain, tightness, cough, phlegm, mucus, heartburn, or reflux.    MEDICATIONS:  Cipriano is on Nifedipine, Metoprolol, and Lisinopril for blood pressure. He is taking Metformin for blood sugar and Atorvastatin for cholesterol. Cipriano is on Cilostazol (Pletal) to increase blood circulation in his legs. He uses a Lidocaine patch on his back for pain relief. Cipriano takes Zyrtec for sinus and allergies. He is on Cialis as needed for erectile dysfunction and Trazodone as needed for sleep. Voltaren gel is used as needed for arthritis.    MEDICAL HISTORY:  Cipriano has a history of gout, diabetes, hypertension, hypercholesterolemia, and erectile dysfunction. Cipriano has received a flu vaccine for this season.    TEST RESULTS:  Cipriano's recent uric acid level was 6.6, which is the second-best result so far. His best uric acid result was 5.4 on 12/6/21. The highest uric acid level was 7.8, recorded both in June (year not specified) and 4 years ago. His recent testosterone level was 427, which is within the normal range of 264-916. Cipriano's recent glucose was slightly high at 118. Recent kidney, liver, and electrolyte tests were good. His  recent total cholesterol was 147, with LDL cholesterol at 78. The best total cholesterol result was 128, recorded 1 year ago. In  (year not specified), his A1c was 6.6.    SOCIAL HISTORY:  Cipriano is a  and [facility]      ROS:  General: +weight gain  Cardiovascular: -chest pain  Respiratory: -cough  Gastrointestinal: -heartburn  Musculoskeletal: -joint pain  Male Genitourinary: +erectile dysfunction         Patient is a 69-year-old male who comes for 6-9 months follow-up.    Overall health is faire to good. wife has been somewhat ill off late for the last 6 months or more and she had frequent falls.  He had to be frequently with his wife to look after her.  She had bleed in the brain also.  Details are unknown at this point.    Underlying medical issues are as below:-    1. Benign essential hypertension - Nifedepine, Lisinopril, Metoprolol.  2. Hypercholesterolemia -rosuvastatin  3. Hyperuricemia -allopurinol.  4. Impaired fasting glucose - Metformin  5. Other male erectile dysfunction - cialis.  6. Non-seasonal allergic rhinitis due to pollen :-on Flonase and ipratropium  7.         Prescription for cilostazol noted for peripheral vascular disease.  This was based upon testing.  Not sure how his symptoms are.    8.         Sleep- Trazodone   Past Medical History:   Diagnosis Date    Depression     Diabetes mellitus, type 2     GERD (gastroesophageal reflux disease)     Hyperlipidemia     Hypertension     Olecranon bursitis of right elbow 10/23/2019    Personal history of colonic polyps 2024     Social History     Socioeconomic History    Marital status:      Spouse name: Julieta    Number of children: 2   Occupational History    Occupation: [employer]   Tobacco Use    Smoking status: Former     Current packs/day: 0.00     Types: Cigarettes     Quit date: 1994     Years since quittin.2    Smokeless tobacco: Never   Substance and Sexual Activity    Alcohol use:  "Yes     Alcohol/week: 4.0 - 5.0 standard drinks of alcohol     Types: 1 - 2 Glasses of wine, 3 Cans of beer per week     Comment: weekends    Drug use: No    Sexual activity: Yes     Partners: Female     Birth control/protection: None     Social Drivers of Health     Financial Resource Strain: Low Risk  (7/17/2024)    Overall Financial Resource Strain (CARDIA)     Difficulty of Paying Living Expenses: Not hard at all   Food Insecurity: No Food Insecurity (7/17/2024)    Hunger Vital Sign     Worried About Running Out of Food in the Last Year: Never true     Ran Out of Food in the Last Year: Never true   Transportation Needs: No Transportation Needs (7/17/2024)    PRAPARE - Transportation     Lack of Transportation (Medical): No     Lack of Transportation (Non-Medical): No   Physical Activity: Sufficiently Active (7/17/2024)    Exercise Vital Sign     Days of Exercise per Week: 3 days     Minutes of Exercise per Session: 60 min   Stress: No Stress Concern Present (7/17/2024)    [employer] - Occupational Stress Questionnaire     Feeling of Stress : Not at all   Housing Stability: Low Risk  (7/17/2024)    Housing Stability Vital Sign     Unable to Pay for Housing in the Last Year: No     Homeless in the Last Year: No     Past Surgical History:   Procedure Laterality Date    COLONOSCOPY  07/29/2024    The patient will repeat the colonoscopy in 5 yrs.    DENTAL SURGERY  04/05/2019 5/24/2019    HERNIA REPAIR      NASAL SEPTUM SURGERY       Family History   Problem Relation Name Age of Onset    Hypertension Mother Elizabet     Cancer Mother Elizabet         ??    Hypertension Father Allen     Heart disease Father Allen     Heart failure Father Allen     No Known Problems Sister      No Known Problems Sister      No Known Problems Daughter         Objective:      Physical Exam  Blood pressure 139/82, pulse (P) 67, height 6' 1" (1.854 m), weight 104.3 kg (230 lb). Body mass index is 30.34 " kg/m².  Physical Exam    Vitals: Weight: 230 lbs. Blood pressure: 139/82.  General: No acute distress. Well-developed. Well-nourished.  Eyes: EOMI. Sclerae anicteric.  HENT: Normocephalic. Atraumatic. Nares patent. Moist oral mucosa.    Cardiovascular: Regular rate. Regular rhythm. No murmurs. No rubs. No gallops. Normal S1, S2.  Respiratory: Normal respiratory effort. Clear to auscultation bilaterally. No rales. No rhonchi. No wheezing.  Abdomen: Soft. Non-tender. Non-distended. Normoactive bowel sounds.  Musculoskeletal: No  obvious deformity.  Extremities: No lower extremity edema.  Neurological: Alert & oriented x3. No slurred speech. Normal gait.  Psychiatric:  Euthymic  Skin: Warm. Dry. No rash.              Assessment:       Lab Visit on 11/04/2024   Component Date Value Ref Range Status    Cholesterol 11/04/2024 147  120 - 199 mg/dL Final    Triglycerides 11/04/2024 102  30 - 150 mg/dL Final    HDL 11/04/2024 48  40 - 75 mg/dL Final    LDL Cholesterol 11/04/2024 78.6  63.0 - 159.0 mg/dL Final    HDL/Cholesterol Ratio 11/04/2024 32.7  20.0 - 50.0 % Final    Total Cholesterol/HDL Ratio 11/04/2024 3.1  2.0 - 5.0 Final    Non-HDL Cholesterol 11/04/2024 99  mg/dL Final    Sodium 11/04/2024 138  136 - 145 mmol/L Final    Potassium 11/04/2024 4.3  3.5 - 5.1 mmol/L Final    Chloride 11/04/2024 106  95 - 110 mmol/L Final    CO2 11/04/2024 28  23 - 29 mmol/L Final    Glucose 11/04/2024 118 (H)  70 - 110 mg/dL Final    BUN 11/04/2024 9  8 - 23 mg/dL Final    Creatinine 11/04/2024 0.9  0.5 - 1.4 mg/dL Final    Calcium 11/04/2024 9.8  8.7 - 10.5 mg/dL Final    Total Protein 11/04/2024 7.2  6.0 - 8.4 g/dL Final    Albumin 11/04/2024 4.4  3.5 - 5.2 g/dL Final    Total Bilirubin 11/04/2024 0.5  0.1 - 1.0 mg/dL Final    Alkaline Phosphatase 11/04/2024 99  55 - 135 U/L Final    AST 11/04/2024 24  10 - 40 U/L Final    ALT 11/04/2024 24  10 - 44 U/L Final    eGFR 11/04/2024 >60.0  >60 mL/min/1.73 m^2 Final    Anion Gap  11/04/2024 4 (L)  8 - 16 mmol/L Final    Testosterone 11/04/2024 427  264 - 916 ng/dL Final    Uric Acid 11/04/2024 6.6  3.4 - 7.0 mg/dL Final     Glucose 70 - 110 mg/dL 118 High  124 High  129 High  117 High  100 134 High    2 Result Notes       1 Patient Communication       1 HM Topic         Component Ref Range & Units 3 wk ago 1 yr ago 4 yr ago   Cholesterol 120 - 199 mg/dL 147 128  CM   Comment: The National Cholesterol Education Program (NCEP) has set the  following guidelines (reference ranges) for Cholesterol:  Optimal.....................<200 mg/dL  Borderline High.............200-239 mg/dL  High........................> or = 240 mg/dL   Triglycerides 30 - 150 mg/dL 102 61  CM   Comment: The National Cholesterol Education Program (NCEP) has set the  following guidelines (reference values) for triglycerides:  Normal......................<150 mg/dL  Borderline High.............150-199 mg/dL  High........................200-499 mg/dL   HDL 40 - 75 mg/dL 48 54 CM 44 CM   Comment: The National Cholesterol Education Program (NCEP) has set the  following guidelines (reference values) for HDL Cholesterol:  Low...............<40 mg/dL  Optimal...........>60 mg/dL   LDL Cholesterol 63.0 - 159.0 mg/dL 78.6 61.8 Low  CM 85.8 CM        Component Ref Range & Units 5 mo ago  (6/10/24) 1 yr ago  (11/22/23) 1 yr ago  (1/18/23) 3 yr ago  (6/2/21) 3 yr ago  (1/26/21) 4 yr ago  (9/28/20) 4 yr ago  (5/28/20)   Hemoglobin A1C 4.5 - 6.2 % 6.6 High  6.7 High  CM 6.0 CM 6.4 High  CM 6.0 CM 5.9 CM 6.3 High  CM   1 Result Note       1 Patient Communication       1 HM Topic        Component Ref Range & Units 5 mo ago 1 yr ago   PSA, Screen 0.00 - 4.00 ng/mL 0.44 0.36         Component Ref Range & Units 4 wk ago  (11/4/24) 5 mo ago  (6/10/24) 1 yr ago  (11/22/23) 1 yr ago  (1/18/23) 3 yr ago  (1/26/21) 4 yr ago  (9/28/20)   Uric Acid 3.4 - 7.0 mg/dL 6.6 7.8 High  7.3 High  6.7 5.4 7.8 High        Assessment & Plan     Assessed blood pressure: 139/82, slightly elevated but within range  Reviewed recent labs: uric acid 6.6 (improved from previous elevated of 7.8), testosterone 427 (appropriate for age), glucose 118 (slightly elevated)  Evaluated cholesterol: total 147, LDL 78 (increased from previous best of 128 but still within range)  Considered weight gain as potential factor in cholesterol increase  Noted stable weight around 230 lbs, with best recent weight at 223 lbs  Assessed prostate symptoms: some difficulty initiating urination, but overall reasonably controlled  Relied on home blood pressure readings due to potential office hypertension    E11.9 TYPE 2 DIABETES MELLITUS WITHOUT COMPLICATIONS:  - Cipriano to check blood sugar at home.  - Continued Metformin for blood sugar control.  - Hemoglobin A1c ordered before next follow-up.    I10 ESSENTIAL (PRIMARY) HYPERTENSION:  - Continued nifedipine for blood pressure.  - Continued Metoprolol for blood pressure.  - Continued lisinopril for blood pressure.    E78.00 PURE HYPERCHOLESTEROLEMIA, UNSPECIFIED:  - Continued atorvastatin for cholesterol.    M1A.9XX0 CHRONIC GOUT, UNSPECIFIED, WITHOUT TOPHUS (TOPHI):  - Cipriano to continue to watch diet for gout management.    N40.1 BENIGN PROSTATIC HYPERPLASIA WITH LOWER URINARY TRACT SYMPTOMS:  - PSA test to be ordered at next visit (after June to comply with insurance requirements).    N52.9 MALE ERECTILE DYSFUNCTION, UNSPECIFIED:  - Continued Cialis, use as needed for erectile dysfunction.  - Discussed normal testosterone ranges and potential risks of artificially elevating levels.    M54.50 LOW BACK PAIN, UNSPECIFIED:  - Continued lidocaine patch for [facility] pain [facility].  - Continued Voltaren gel for arthritis, to be refilled if needed.    G47.00 INSOMNIA, UNSPECIFIED:  - Continued Trazodone, use as needed for sleep.    J30.9 ALLERGIC RHINITIS, UNSPECIFIED:  - Continued Zyrtec for allergies, advised to take at night due to  drowsiness.    I73.9 PERIPHERAL VASCULAR DISEASE, UNSPECIFIED:  - Continued cilostazol (Pletal) for leg circulation.    Z23 ENCOUNTER FOR IMMUNIZATION:  - Explained RSV vaccine: new vaccine available, effective for about 5 years, protects against RSV alongside flu and COVID.    LIFESTYLE CHANGES:  - Basic chemistry panel ordered before next follow-up.  - Follow up in 4-6 months.  - Complete ordered labs before next follow-up visit.  - Contact office via message for any questions, response will be provided by the weekend.         Plan:   Impaired fasting glucose  Comments:  Continue metformin and efforts at watching diet and exercise.  Orders:  -     Hemoglobin A1C; Future; Expected date: 06/03/2025    Benign essential hypertension  Comments:  Currently on lisinopril 20 mg, metoprolol XL 50 and nifedipine 60 mg color exercise.  Watch for salt and stress.  Continue efforts at watching diet  Orders:  -     Basic Metabolic Panel; Future; Expected date: 06/03/2025    Benign prostatic hyperplasia, presence of lower urinary tract symptoms unspecified  Comments:  Tamsulosin 0.4 mg    Gastroesophageal reflux disease without esophagitis  Comments:  Stable reflux symptoms    Mixed dyslipidemia  Comments:  Continue atorvastatin 10 mg.  Lipid  Levels are good.  Watch diet  Orders:  -     Basic Metabolic Panel; Future; Expected date: 06/03/2025    Hyperuricemia  Comments:  Uric acid level to be checked next time.  Continue to watch diet.  Currently on allopurinol.  Continue same.    Erectile dysfunction, unspecified erectile dysfunction type  Comments:  Doing okay.  Uses Cialis as needed.    Psychophysiological insomnia  Comments:  Uses trazodone as needed for sleep.    Patient's medical issues has been reviewed.  Past A1c 6.6 which is marginal and almost in diabetic range  Blood pressure control is appropriate.  His blood sugars checked at home or mostly 110s to 114.  His weight is fairly stable at 230 lb though his best weight  was 223 lb sometimes last year.  His uric acid level is the best at this point with no recent episode of gout.  He was continuing to watch his diet.  Testosterone levels are in appropriate range for his age and station of life  Prostate symptoms are somewhat reasonably controlled though not the best  Check A1c levels next visit  Blood pressures tend to be elevated in the office and I will rely more upon his home blood pressure readings.        Advised Pt about age and season appropriate immunizations/ cancer screenings.  Also seasonal influenza vaccine, update on tetanus diphtheria vaccination every 10 years.  Patient has been advised to watch diet and exercise. Avoid fried and fatty food. Compliance to medications and follow up urged.  Advised Pt. to monitor Blood sugars at home and record them.  Advised Pt  for Anti reflux measures like small feequent meals, avoid spicy and greasy food. Head end up at night.  keep a close eye on feet and keep them clean. Annual eye examination. Annual influenza vaccine.  Monitor HgbA1c every 3 to 6 months. Monitor urine microalbumin every year.keep LDL less than 100. Monitor blood pressure and target blood pressure 120/70.  Please utilize precautions for current COVID-19 pandemic.  Try to avoid crowds or close contact with multiple people.  Minimize outside interaction.  Wash hands with soap for  frequently upon contact.Use face mask or cover.    Fup-4 months    Spent jeannette 30 minutes with patient which involved review of pts medical conditions, labs, medications and with 50% of time face-to-face discussion about medical problems, management and any applicable changes.    Follow up in about 4 months (around 4/3/2025) for Hypertension/lipids.      Current Outpatient Medications:     allopurinoL (ZYLOPRIM) 100 MG tablet, , Disp: , Rfl:     aspirin (ECOTRIN) 81 MG EC tablet, Take 1 tablet by mouth Daily., Disp: , Rfl:     atorvastatin (LIPITOR) 10 MG tablet, Take 1 tablet (10  mg total) by mouth every evening., Disp: 90 tablet, Rfl: 4    cetirizine (ZYRTEC) 10 MG tablet, Take 10 mg by mouth once daily., Disp: , Rfl:     chlorhexidine (PERIDEX) 0.12 % solution, SMARTSIG:By Mouth, Disp: , Rfl:     cilostazoL (PLETAL) 100 MG Tab, Take 100 mg by mouth 2 (two) times daily. Pt states taking 1 tab daily., Disp: , Rfl:     diclofenac sodium (VOLTAREN) 1 % Gel, Apply 2 g topically once daily., Disp: 100 g, Rfl: 0    fluticasone propionate (FLONASE) 50 mcg/actuation nasal spray, , Disp: , Rfl:     hydrocortisone valerate (WEST-KIMANI) 0.2 % ointment, 1 Dose once daily., Disp: , Rfl:     ibuprofen (ADVIL,MOTRIN) 800 MG tablet, TAKE 1 TABLET THREE TIMES A DAY AS NEEDED FOR PAIN AND JOINT PAIN, Disp: 90 tablet, Rfl: 11    ipratropium (ATROVENT) 0.03 % nasal spray, USE 2 SPRAYS NASALLY TWICE A DAY, Disp: 90 mL, Rfl: 4    LIDOcaine (LIDODERM) 5 %, 1 patch once daily. PRN, Disp: , Rfl:     lisinopriL (PRINIVIL,ZESTRIL) 20 MG tablet, TAKE 1 TABLET BY MOUTH EVERY DAY, Disp: 90 tablet, Rfl: 4    metFORMIN (GLUCOPHAGE) 500 MG tablet, Take 1 tablet (500 mg total) by mouth daily with breakfast., Disp: 90 tablet, Rfl: 4    metoprolol succinate (TOPROL-XL) 50 MG 24 hr tablet, TAKE 1 TABLET DAILY, Disp: 90 tablet, Rfl: 3    NIFEdipine (ADALAT CC) 60 MG TbSR, Take 1 tablet (60 mg total) by mouth once daily., Disp: 90 tablet, Rfl: 4    SUFLAVE 178.7-7.3-0.5 gram SolR, 1 Bottle., Disp: , Rfl:     SYSTANE ULTRA, PF, 0.4-0.3 % Dpet, , Disp: , Rfl:     tadalafiL (CIALIS) 20 MG Tab, Take 1 tablet (20 mg total) by mouth daily as needed (for eectiel dysfunction). Please do Not exceed more than 1 pill a day, Disp: 30 tablet, Rfl: 4    tamsulosin (FLOMAX) 0.4 mg Cap, Take 1 capsule (0.4 mg total) by mouth every evening., Disp: 90 capsule, Rfl: 3    traZODone (DESYREL) 50 MG tablet, 1 tablet daily as needed., Disp: , Rfl:     This note was generated with the assistance of ambient listening technology. Verbal consent was  obtained by the patient and accompanying visitor(s) for the recording of patient appointment to facilitate this note. I attest to having reviewed and edited the generated note for accuracy, though some syntax or spelling errors may persist. Please contact the author of this note for any clarification.      Chuckie Alberto      Answers submitted by the patient for this visit:  Review of Systems Questionnaire (Submitted on 11/30/2024)  activity change: No  unexpected weight change: No  neck pain: No  hearing loss: No  rhinorrhea: No  trouble swallowing: No  eye discharge: No  visual disturbance: No  chest tightness: No  wheezing: No  chest pain: No  palpitations: No  blood in stool: No  constipation: No  vomiting: No  diarrhea: No  polydipsia: No  polyuria: No  difficulty urinating: No  urgency: No  hematuria: No  joint swelling: No  arthralgias: No  headaches: No  weakness: No  confusion: No  dysphoric mood: No

## 2024-12-03 ENCOUNTER — PATIENT OUTREACH (OUTPATIENT)
Dept: ADMINISTRATIVE | Facility: HOSPITAL | Age: 69
End: 2024-12-03
Payer: MEDICARE

## 2024-12-03 ENCOUNTER — PATIENT MESSAGE (OUTPATIENT)
Dept: FAMILY MEDICINE | Facility: CLINIC | Age: 69
End: 2024-12-03

## 2024-12-03 ENCOUNTER — OFFICE VISIT (OUTPATIENT)
Dept: FAMILY MEDICINE | Facility: CLINIC | Age: 69
End: 2024-12-03
Payer: MEDICARE

## 2024-12-03 VITALS
HEIGHT: 73 IN | DIASTOLIC BLOOD PRESSURE: 82 MMHG | WEIGHT: 230 LBS | SYSTOLIC BLOOD PRESSURE: 139 MMHG | BODY MASS INDEX: 30.48 KG/M2

## 2024-12-03 DIAGNOSIS — E78.2 MIXED DYSLIPIDEMIA: ICD-10-CM

## 2024-12-03 DIAGNOSIS — F51.04 PSYCHOPHYSIOLOGICAL INSOMNIA: ICD-10-CM

## 2024-12-03 DIAGNOSIS — N40.0 BENIGN PROSTATIC HYPERPLASIA, PRESENCE OF LOWER URINARY TRACT SYMPTOMS UNSPECIFIED: ICD-10-CM

## 2024-12-03 DIAGNOSIS — E79.0 HYPERURICEMIA: ICD-10-CM

## 2024-12-03 DIAGNOSIS — R73.01 IMPAIRED FASTING GLUCOSE: Primary | Chronic | ICD-10-CM

## 2024-12-03 DIAGNOSIS — N52.9 ERECTILE DYSFUNCTION, UNSPECIFIED ERECTILE DYSFUNCTION TYPE: ICD-10-CM

## 2024-12-03 DIAGNOSIS — K21.9 GASTROESOPHAGEAL REFLUX DISEASE WITHOUT ESOPHAGITIS: ICD-10-CM

## 2024-12-03 DIAGNOSIS — I10 BENIGN ESSENTIAL HYPERTENSION: Chronic | ICD-10-CM

## 2024-12-03 PROCEDURE — 99214 OFFICE O/P EST MOD 30 MIN: CPT | Mod: S$PBB,AQ,, | Performed by: INTERNAL MEDICINE

## 2024-12-03 PROCEDURE — 99999 PR PBB SHADOW E&M-EST. PATIENT-LVL III: CPT | Mod: PBBFAC,,, | Performed by: INTERNAL MEDICINE

## 2024-12-03 PROCEDURE — 99213 OFFICE O/P EST LOW 20 MIN: CPT | Mod: PBBFAC,PN | Performed by: INTERNAL MEDICINE

## 2024-12-03 NOTE — PROGRESS NOTES
Population Health Chart Review & Patient Outreach Details      Additional [facility] Health Notes:               Updates Requested / Reviewed:      Updated Care Coordination Note, Care Everywhere, , and Immunizations Reconciliation Completed or Queried: [facility] Topics Overdue:      [facility] Score: 3     Eye Exam  Foot Exam  AAA Screening    Tetanus Vaccine  RSV Vaccine                  Health Maintenance Topic(s) Outreach Outcomes & Actions Taken:    Eye Exam - Outreach Outcomes & Actions Taken  : External Records Requested & Care Team Updated if Applicable

## 2024-12-03 NOTE — LETTER
AUTHORIZATION FOR RELEASE OF   CONFIDENTIAL INFORMATION    Dear  [facility] Medical Records,      We are seeing Cipriano Gunderson, date of birth 1955, in the clinic at [facility] [address] [facility]. Chuckie Alberto MD is the patient's PCP. Cipriano Gunderson has an outstanding lab/procedure at the time we reviewed his chart. In order to help keep his health information updated, he has authorized us to request the following medical record(s):           ( x )  EYE EXAM                Please fax records to [facility], Raina, Sanjay, MD, [phone]   If you have any questions, please contact       Cristin Ramirez  Nurse Clinical Care Coordinator  [facility]  Phone: [phone]  Fax: [phone]      Patient Name: Cipriano Gunderson  : 1955  Patient Phone #: [phone]                Cipriano Gunderson  MRN: [id number]  : 1955  Age: 69 y.o.  Sex: male         Patient/Legal Guardian Signature  This signature was collected at 2024           _______________________________   Printed Name/Relationship to Patient      Consent for Examination and Treatment: I hereby authorize the providers and employees of [facility] ([facility]) to provide medical treatment/services which includes, but is not limited to, performing and administering tests and diagnostic procedures that are deemed necessary, including, but not limited to, imaging examinations, blood tests and other laboratory procedures as may be required by the hospital, clinic, or may be ordered by my physician(s) or persons working under the general and/or special instructions of my physician(s).      I understand and agree that this consent covers all authorized persons, including but not limited to physicians, residents, nurse practitioners, physicians' assistants, specialists, consultants, student nurses, and independently contracted physicians, who are called upon by the  physician in charge, to carry out the diagnostic procedures and medical or surgical treatment.     I hereby authorize [facility] to retain or dispose of any specimens or tissue, should there be such remaining from any test or procedure.     I hereby authorize and give consent for [facility] providers and employees to take photographs, images or videotapes of such diagnostic, surgical or treatment procedures of Patient as may be required by [facility] or as may be ordered by a physician. I further acknowledge and agree that [facility] may use cameras or other devices for patient monitoring.     I am aware that the practice of medicine is not an exact science, and I acknowledge that no guarantees have been made to me as to the outcome of any tests, procedures or treatment.     Authorization for Release of Information: I understand that my insurance company and/or their agents may need information necessary to make determinations about payment/reimbursement. I hereby provide authorization to release to all insurance companies, their successors, assignees, other parties with whom they may have contracted, or others acting on their behalf, that are involved with payment for any hospital and/or clinic charges incurred by the patient, any information that they request and deem necessary for payment/reimbursement, and/or quality review.  I further authorize the release of my health information to physicians or other health care practitioners on staff who are involved in my health care now and in the future, and to other health care providers, entities, or institutions for the purpose of my continued care and treatment, including referrals.     REGISTRATION AUTHORIZATION  Form No. 93988 (Rev. 3/25/2024)    Page 1 of 3                       Medicare Patient's Certification and Authorization to Release Information and Payment Request:  I certify that the information given by me in applying for payment under Title XVIII of the Social  Security Act is correct. I authorize any grijalva of medical or other information about me to release to the [facility], or its intermediaries or carriers, any information needed for this or a related Medicare claim. I request that payment of authorized benefits be made on my behalf.     Assignment of Insurance Benefits:   I hereby authorize any and all insurance companies, health plans, defined   benefit plans, health insurers or any entity that is or may be responsible for payment of my medical expenses to pay all hospital and medical benefits now due, and to become due and payable to me under any hospital benefits, sick benefits, injury benefits or any other benefit for services rendered to me, including Major Medical Benefits, direct to [facility] and all independently contracted physicians. I assign any and all rights that I may have against any and all insurance companies, health plans, defined benefit plans, health insurers or any entity that is or may be responsible for payment of my medical expenses, including, but not limited to any right to appeal a denial of a claim, any right to bring any action, lawsuit, administrative proceeding, or other cause of action on my behalf. I specifically assign my right to pursue litigation against any and all insurance companies, health plans, defined benefit plans, health insurers or any entity that is or may be responsible for payment of my medical expenses based upon a refusal to pay charges.            E. Valuables: It is understood and agreed that [facility] is not liable for the damage to or loss of any money, jewelry,   documents, dentures, eye glasses, hearing aids, prosthetics, or other property of value.     F. Computer Equipment: I understand and agree that should I choose to use computer equipment owned by [facility] or if I choose to access the Internet via [facility]s network, I do so at my own risk. [facility] is not responsible for any damage to my  computer equipment or to any damages of any type that might arise from my loss of equipment or data.     G. Acceptance of Financial Responsibility:  I agree that in consideration of the services and   supplies that have been   or will be furnished to the patient, I am hereby obligated to pay all charges made for or on the account of the patient according to the standard rates (in effect at the time the services and supplies are delivered) established by [facility], including its Patient Financial Assistance Policy to the extent it is applicable. I understand that I am responsible for all charges, or portions thereof, not covered by insurance or other sources. Patient refunds will be distributed only after balances at all [facility] facilities are paid.     H. Communication Authorization:  I hereby authorize [facility] and its representatives, along with any billing service   or  who may work on their behalf, to contact me on   my cell phone and/or home phone using pre- recorded messages, artificial voice messages, automatic telephone dialing devices or other computer assisted technology, or by electronic      mail, text messaging, or by any other form of electronic communication. This includes, but is not limited to, appointment reminders, yearly physical exam reminders, preventive care reminders, patient campaigns, welcome calls, and calls about account balances on my account or any account on which I am listed as a guarantor. I understand I have the right to opt out of these communications at any time.      Relationship  Between  Facility and  Provider:      I understand that some, but not all, providers furnishing services to the patient are not employees or agents of [facility]. The patient is under the care and supervision of his/her attending physician, and it is the responsibility of the facility and its nursing staff to carry out the instructions of such physicians. It is the responsibility of the  patient's physician/designee to obtain the patient's informed consent, when required, for medical or surgical treatment, special diagnostic or therapeutic procedures, or hospital services rendered for the patient under the special instructions of the physician/designee.           REGISTRATION AUTHORIZATION  Form No. 66533 (Rev. 3/25/2024)    Page 2 of 3                       Immunizations: [facility] shares immunization information with state sponsored health departments to help you and your doctor keep track of your immunization records. By signing, you consent to have this information shared with the health department in your state:                                [place] - LINKS ([place] Immunization Network for Kids Statewide)                                [place] - MIIX ([place] Immunization Information eXchange)                                [place] - ImmPRINT (Immunization Patient Registry with Integrated Technology)     TERM: This authorization is valid for this and subsequent care/treatment I receive at [facility] and will remain valid unless/until revoked in writing by me.     [facility]: As used in this document, [facility] means all [facility] owned and managed facilities, including, but not limited to, all health centers, surgery centers, clinics, urgent care centers, and hospitals.         [facility] complies with applicable Federal civil rights laws and does not discriminate on the basis of race, color, national origin, age, disability, or sex.  ATENCIÓN: si habla luis, tiene a miranda disposición servicios gratuitos de asistencia lingüística. Llravin al [phone].  CHÚ Ý: N?u b?n nói Ti?ng Vi?t, có các d?ch v? h? tr? ngôn ng? mi?n phí dành cho b?n. G?i s? [phone].        REGISTRATION AUTHORIZATION  Form No. 25198 (Rev. 3/25/2024)   Page 3 of 3     Patient

## 2025-02-08 DIAGNOSIS — I10 BENIGN ESSENTIAL HYPERTENSION: ICD-10-CM

## 2025-02-10 RX ORDER — LISINOPRIL 20 MG/1
20 TABLET ORAL
Qty: 90 TABLET | Refills: 3 | Status: SHIPPED | OUTPATIENT
Start: 2025-02-10

## 2025-05-02 ENCOUNTER — LAB VISIT (OUTPATIENT)
Dept: LAB | Facility: HOSPITAL | Age: 70
End: 2025-05-02
Attending: INTERNAL MEDICINE
Payer: MEDICARE

## 2025-05-02 DIAGNOSIS — R73.01 IMPAIRED FASTING GLUCOSE: Primary | ICD-10-CM

## 2025-05-02 PROCEDURE — 83036 HEMOGLOBIN GLYCOSYLATED A1C: CPT

## 2025-05-02 PROCEDURE — 36415 COLL VENOUS BLD VENIPUNCTURE: CPT

## 2025-05-03 ENCOUNTER — RESULTS FOLLOW-UP (OUTPATIENT)
Dept: FAMILY MEDICINE | Facility: CLINIC | Age: 70
End: 2025-05-03

## 2025-05-03 LAB
EAG (SMH): 137 MG/DL (ref 68–131)
HBA1C MFR BLD: 6.4 % (ref 4.5–6.2)

## 2025-05-07 ENCOUNTER — PATIENT MESSAGE (OUTPATIENT)
Dept: FAMILY MEDICINE | Facility: CLINIC | Age: 70
End: 2025-05-07

## 2025-05-07 ENCOUNTER — OFFICE VISIT (OUTPATIENT)
Dept: FAMILY MEDICINE | Facility: CLINIC | Age: 70
End: 2025-05-07
Payer: MEDICARE

## 2025-05-07 VITALS
HEART RATE: 91 BPM | WEIGHT: 238.13 LBS | HEIGHT: 73 IN | BODY MASS INDEX: 31.56 KG/M2 | SYSTOLIC BLOOD PRESSURE: 139 MMHG | DIASTOLIC BLOOD PRESSURE: 71 MMHG

## 2025-05-07 DIAGNOSIS — F51.04 PSYCHOPHYSIOLOGICAL INSOMNIA: ICD-10-CM

## 2025-05-07 DIAGNOSIS — E78.2 MIXED DYSLIPIDEMIA: ICD-10-CM

## 2025-05-07 DIAGNOSIS — Z12.5 SCREENING FOR PROSTATE CANCER: ICD-10-CM

## 2025-05-07 DIAGNOSIS — N52.9 ERECTILE DYSFUNCTION, UNSPECIFIED ERECTILE DYSFUNCTION TYPE: Chronic | ICD-10-CM

## 2025-05-07 DIAGNOSIS — E79.0 HYPERURICEMIA: ICD-10-CM

## 2025-05-07 DIAGNOSIS — I73.9 PERIPHERAL VASCULAR DISEASE, UNSPECIFIED: ICD-10-CM

## 2025-05-07 DIAGNOSIS — K21.9 GASTROESOPHAGEAL REFLUX DISEASE WITHOUT ESOPHAGITIS: ICD-10-CM

## 2025-05-07 DIAGNOSIS — I10 BENIGN ESSENTIAL HYPERTENSION: Primary | Chronic | ICD-10-CM

## 2025-05-07 DIAGNOSIS — R73.01 IMPAIRED FASTING GLUCOSE: ICD-10-CM

## 2025-05-07 PROBLEM — I47.29 NSVT (NONSUSTAINED VENTRICULAR TACHYCARDIA): Status: RESOLVED | Noted: 2024-04-22 | Resolved: 2025-05-07

## 2025-05-07 PROCEDURE — 99213 OFFICE O/P EST LOW 20 MIN: CPT | Mod: PBBFAC,PN | Performed by: INTERNAL MEDICINE

## 2025-05-07 PROCEDURE — 99999 PR PBB SHADOW E&M-EST. PATIENT-LVL III: CPT | Mod: PBBFAC,,, | Performed by: INTERNAL MEDICINE

## 2025-05-07 NOTE — PROGRESS NOTES
Subjective:       Patient ID: Cipriano Gunderson is a 70 y.o. male.    Chief Complaint: Hypertension, Hyperlipidemia, Labs Only, Erectile Dysfunction, Peripheral Vascular Disease, Prediabetes, Gouty arthritis, and Prostate Problem    History of Present Illness    CHIEF COMPLAINT:  Cipriano presents for a follow-up visit to discuss blood pressure management, diabetes status, and recent urological evaluation.    HPI:  Cipriano reports concern about his blood pressure readings at home, with systolic occasionally dropping below 120 mmHg and diastolic sometimes in the 60s mmHg. He denies feeling dizzy or lightheaded when his blood pressure is low.    Regarding blood sugar, he acknowledges that elevated levels are likely due to eating habits, particularly consuming junk food and sweets. His sister from [place], [place] owns a bakery and frequently provides baked goods, including a recent platter of pralines and a [place] chocolate cake for his birthday on [birth year] 4[birth year].    [facility] with [facility]. The urologist found nothing concerning. The possibility of a TURP (Transurethral Resection of the Prostate) procedure was mentioned. He sought a second opinion, which did not recommend the procedure based on x-rays and other tests. He expresses concern about potential discomfort and complications associated with TURP, including the need for a catheter bag for 3 days post-surgery.    He denies any current urinary symptoms, nocturnal urination, frequent urination, or urinary leakage. He reports urinating only upon waking and not during the night. His sleep is described as good.    He does report intermittently low blood pressures though not with dizziness.  I am wondering if he found any correlation between his 3 blood pressure medications and taking Cialis or tamsulosin at the same time.  His blood pressures have never usually gone less than 110 systolic or significantly  "less than 60 diastolic.    MEDICATIONS:  Cipriano is on Lisinopril 20 mg, Metoprolol 50 mg, and Nifedipine 60 mg daily for blood pressure control. He is also on Tamsulosin (Flomax) for prostate/urinary symptoms, Atorvastatin 10 mg daily for cholesterol, and Allopurinol 100 mg daily to prevent gout. For sinus problems, he takes Cetirizine (Zyrtec) at nighttime, Fluticasone (Flonase), and Ipratropium nasal spray. Cipriano is on Cilostazol (Pletal) to improve circulation, Metformin for blood sugar control, and Ibuprofen as needed for arthritis or back pain. He takes Trazodone as needed for sleep and is also on Sildenafil (Cialis).    MEDICAL HISTORY:  Cipriano has a history of hypertension, prediabetes, gout, enlarged prostate, and sinus problems.    FAMILY HISTORY:  Family history is significant for sister who owns a bakery.    TEST RESULTS:  Cipriano's A1C level have been monitored over the past few years. His most recent A1C was 6.4%, with a reading of 6.6% in June 2024. Previous A1C readings were 6.7% one year ago, 6.0% two years ago, 6.4% three years ago, and 5.9% four years ago. His uric acid levels were 6.6 mg/dL last year, down from 7.8 mg/dL in the prior year. Testosterone levels checked last year were within the normal range. Cipriano's PSA was reported as "all good." [facility] where [facility] abnormal was found.    IMAGING:  [facility] and were reported as "okay."    SOCIAL HISTORY:   History: [facility]    Nonsmoker-quit smoking in 1994  -ex-smoker  Social alcohol      ROS:  General: -fever, -chills, -fatigue, -weight gain, -weight loss  Cardiovascular: -chest pain, -palpitations, -lower extremity edema  Respiratory: -cough, -shortness of breath  Gastrointestinal: -abdominal pain, -nausea, -vomiting, -diarrhea, -constipation, -blood in stool  Skin: -rash, -lesion  Neurological: -headache, -dizziness, -numbness, -tingling  Nose: +sinus " "pressure  Musculoskeletal: +back pain, +joint pain         Underlying medical issues are as below:-    1. Benign essential hypertension - Nifedepine, Lisinopril, Metoprolol.  2. Hypercholesterolemia -rosuvastatin  3. Hyperuricemia -allopurinol.  4. Impaired fasting glucose - Metformin  5. Other male erectile dysfunction - cialis.  6. Non-seasonal allergic rhinitis due to pollen :-on Flonase and ipratropium  7.         Prescription for cilostazol noted for peripheral vascular disease.  This was based upon testing.  Not sure how his symptoms are.    8.         Sleep- Trazodone   Past Medical History:   Diagnosis Date    Depression     Diabetes mellitus, type 2     GERD (gastroesophageal reflux disease)     Hyperlipidemia     Hypertension     Olecranon bursitis of right elbow 10/23/2019    Personal history of colonic polyps 07/29/2024     Social History[1]  Past Surgical History:   Procedure Laterality Date    COLONOSCOPY  07/29/2024    The patient will repeat the colonoscopy in 5 yrs.    DENTAL SURGERY  04/05/2019 5/24/2019    HERNIA REPAIR      NASAL SEPTUM SURGERY       Family History   Problem Relation Name Age of Onset    Hypertension Mother Elizabet     Cancer Mother Elizabet         ??    Hypertension Father Lalen     Heart disease Father Allen     Heart failure Father Allen     No Known Problems Sister      No Known Problems Sister      No Known Problems Daughter         Objective:      Physical Exam  Blood pressure 139/71, pulse 91, height 6' 1" (1.854 m), weight 108 kg (238 lb 1.6 oz). Body mass index is 31.41 kg/m².  Physical Exam    Vitals: Blood pressure: 139/71.  General: No acute distress. Well-developed. Well-nourished.  Eyes: EOMI. Sclerae anicteric.  Cardiovascular: Regular rate. Regular rhythm. No murmurs. No rubs. No gallops. Normal S1, S2.  Respiratory: Normal respiratory effort. Clear to auscultation bilaterally. No rales. No rhonchi. No wheezing.  Musculoskeletal: No  obvious " deformity.  Extremities: No lower extremity edema.  Neurological: Alert & oriented .  Psychiatric:  Mostly euthymic  Skin: Warm. Dry.              Assessment:       Lab Visit on 05/02/2025   Component Date Value Ref Range Status    Hemoglobin A1c 05/02/2025 6.4 (H)  4.5 - 6.2 % Final    Estimated Average Glucose 05/02/2025 137 (H)  68 - 131 mg/dL Final       Assessment & Plan    E11.9 Type 2 diabetes mellitus without complications  I10 Essential (primary) hypertension  I73.9 Peripheral vascular disease, unspecified  E78.5 Hyperlipidemia, unspecified  R73.03 Prediabetes  M1A.9XX0 Chronic gout, unspecified, without tophus (tophi)  N40.0 Benign prostatic hyperplasia without lower urinary tract symptoms  J32.9 Chronic sinusitis, unspecified  M19.90 Unspecified osteoarthritis, unspecified site  M54.9 Dorsalgia, unspecified  G47.00 Insomnia, unspecified  Z79.1 Long term (current) use of non-steroidal anti-inflammatories (NSAID)    IMPRESSION:  - Assessed BP control, noting recent readings of 139/71 in office and lower readings at home.  - Evaluated A1C trend, noting current level of 6.4% indicates prediabetes.  - Reviewed testosterone levels, found to be adequate without supplementation.  - Considered cardiovascular risk factors and current medication regimen.  - Discussed prostate health and [facility], advised against surgery at this time due to minimal symptoms and potential risks.    E11.9 TYPE 2 DIABETES MELLITUS WITHOUT COMPLICATIONS:  - Monitored patient's A1C level which have fluctuated over the years: 5.9 four years ago, 6.0 three years ago, 6.4 two years ago, 6.7 one year ago, 6.6 last year, and currently 6.4, indicating prediabetes status.  - Ordered A1C and urine microalbumin tests to be completed before next visit.  - Discussed managing blood sugar through dietary changes, avoiding junk food, and considering the impact of eating habits.  - Continued Metformin for blood sugar management.  -  Scheduled follow up in 4-6 months to reassess.    I10 ESSENTIAL (PRIMARY) HYPERTENSION:  - Clinic BP is 139/71, with patient reporting home readings sometimes below 120 systolic and diastolic in the 60s.  - Systolic BP of 120 or above is acceptable for brain perfusion.  - Explained BP targets and potential hypotension symptoms.  - Instructed patient to monitor home BP, particularly noting consistently low readings.  - If home readings remain consistently low, will consider adjusting medications: reducing Lisinopril from 20mg to 10mg, Metoprolol from 50mg to 25mg, and possibly adjusting Nifedipine.  - Continue current regimen: Lisinopril 20mg, Metoprolol 50mg, and Nifedipine 60mg.  - Ordered basic metabolic panel before next visit.    I73.9 PERIPHERAL VASCULAR DISEASE, UNSPECIFIED:  - Continue Cilostazol (Pletal) for circulation improvement.    E78.5 HYPERLIPIDEMIA, UNSPECIFIED:  - Continue Atorvastatin 10mg for cholesterol management.  - Ordered lipid panel before next visit.    R73.03 PREDIABETES:  - Recent HbA1c is 6.4, improved from 6.6 in June 2024.  - Values above 6.5 are considered diabetic; less than 6.5 is non-diabetic or pre-diabetic.  - Discussed whether to maintain pre-diabetic classification.  - Cipriano attributes blood sugar to eating habits, including consumption of junk food and sweets from sister's bakery.  - Continue Metformin for blood sugar management.    M1A.9XX0 CHRONIC GOUT, UNSPECIFIED, WITHOUT TOPHUS (TOPHI):  - Last year's uric acid level was 6.6, down from a previous high of 7.8, indicating reduced risk for gout attack.  - Continue Allopurinol 100mg daily for prevention.  - Ordered uric acid level before next visit.    N40.0 BENIGN PROSTATIC HYPERPLASIA WITHOUT LOWER URINARY TRACT SYMPTOMS:  - Cipriano reports no issues with urination at night and uninterrupted sleep.  - [facility] showed no issues with good PSA levels.  - Provided information on TURP procedure, including  potential risks and benefits, but determined it's   not recommended due to lack of symptoms and potential side effects.  - Educated on risks of testosterone supplementation, including potential prostate enlargement.  - Continue Tamsulosin (Flomax), noting it may lower blood pressure.  - Ordered PSA test.    J32.9 CHRONIC SINUSITIS, UNSPECIFIED:  - Continue Flonase, Ipratropium nasal spray, and Zyrtec for sinus management.    M19.90 UNSPECIFIED OSTEOARTHRITIS, UNSPECIFIED SITE:  - Continue Ibuprofen as needed for arthritis pain.    M54.9 DORSALGIA, UNSPECIFIED:  - Continue Ibuprofen as needed for back pain.    G47.00 INSOMNIA, UNSPECIFIED:  - Continue Trazodone as needed for sleep.    Z79.1 LONG TERM (CURRENT) USE OF NON-STEROIDAL ANTI-INFLAMMATORIES (NSAID):  - Continue Ibuprofen as needed for arthritis or back pain.         Plan:   Benign essential hypertension  Comments:  Currently on lisinopril 20 q.d., metoprolol XL 50 q.d. and nifedipine 60 once a day.  With tamsulosin and Cialis may drop blood pressures.  Orders:  -     Basic Metabolic Panel; Future; Expected date: 10/19/2025  -     Microalbumin/Creatinine Ratio, Urine; Future; Expected date: 10/19/2025    Gastroesophageal reflux disease without esophagitis  Comments:  Not on any medications and continues to watch diet.    Mixed dyslipidemia  Comments:  Continue atorvastatin 10 mg.  Continue to watch diet and weight management.  Orders:  -     ALT (SGPT); Future; Expected date: 10/19/2025  -     Lipid Panel; Future; Expected date: 10/19/2025    Impaired fasting glucose  Comments:  Recently has been favoring sweets and cookies and cakes prepared by his sister who is a professional [employer]..  Orders:  -     Hemoglobin A1C; Future; Expected date: 10/19/2025    Peripheral vascular disease, unspecified  Comments:  This was checked in past and he is on cilostazol.  Doing okay otherwise.    Hyperuricemia  Comments:  Check uric acid level.  Dietary discretion discussed.   Sweet and sugars may escalate hyperuricemia  Orders:  -     Uric Acid; Future; Expected date: 10/19/2025    Erectile dysfunction, unspecified erectile dysfunction type  Comments:  Uses Cialis p.r.n. depending upon wife's health status and participation    Psychophysiological insomnia  Comments:  Uses trazodone p.r.n..  Does not report any significant adverse effects.    Screening for prostate cancer  Comments:  PSA added to the levels  Orders:  -     PSA, Screening; Future; Expected date: 10/19/2025    Chronic medical issues noted.  Blood pressure control is stable with slight upper state of normal noted.  Continue to monitor blood pressures and adjust if needed.  Prostate symptoms are stable and erectile dysfunction symptoms are stable and if he is doing okay, I do not see any immediate need for TURP.  He is currently taking tadalafil or Cialis p.r.n. though Cialis 5 mg on a daily basis might also be an option.  For insomnia he takes trazodone as needed  For sinus and allergies he takes Zyrtec, Flonase and ipratropium nasal spray.  No recent episode of gout and he uses allopurinol occasionally.  He is updated on pneumonia vaccination, RSV vaccination, COVID vaccination, influenza vaccination and shingles vaccination.  He is also updated on tetanus vaccination on January 10, 2025.  Last PSA was done on 06/10/2024  Years ago he was diagnosed with before vascular disease.    Follow up in about 6 months (around 11/7/2025), or if symptoms worsen or fail to improve, for Hypertension/lipids.    Current Medications[2]    This note was generated with the assistance of ambient listening technology. Verbal consent was obtained by the patient and accompanying visitor(s) for the recording of patient appointment to facilitate this note. I attest to having reviewed and edited the generated note for accuracy, though some syntax or spelling errors may persist. Please contact the author of this note for any  clarification.      Chuckie Alberto    Visit today included increased complexity associated with the care of the episodic problem hypertension/hyperlipidemia/prediabetes addressed and managing the longitudinal care of the patient due to the serious and/or complex managed problem(s) hypertension/hyperlipidemia/prediabetes.          [1]   Social History  Socioeconomic History    Marital status:      Spouse name: Julieta    Number of children: 2   Occupational History    Occupation: [employer]   Tobacco Use    Smoking status: Former     Current packs/day: 0.00     Types: Cigarettes     Quit date: 1994     Years since quittin.7    Smokeless tobacco: Never   Substance and Sexual Activity    Alcohol use: Yes     Alcohol/week: 4.0 - 5.0 standard drinks of alcohol     Types: 1 - 2 Glasses of wine, 3 Cans of beer per week     Comment: weekends    Drug use: No    Sexual activity: Yes     Partners: Female     Birth control/protection: None     Social Drivers of Health     Financial Resource Strain: Low Risk  (2024)    Overall Financial Resource Strain (CARDIA)     Difficulty of Paying Living Expenses: Not hard at all   Food Insecurity: No Food Insecurity (2024)    Hunger Vital Sign     Worried About Running Out of Food in the Last Year: Never true     Ran Out of Food in the Last Year: Never true   Transportation Needs: No Transportation Needs (2024)    PRAPARE - Transportation     Lack of Transportation (Medical): No     Lack of Transportation (Non-Medical): No   Physical Activity: Sufficiently Active (2024)    Exercise Vital Sign     Days of Exercise per Week: 3 days     Minutes of Exercise per Session: 60 min   Stress: No Stress Concern Present (2024)    [place] [employer] - Occupational Stress Questionnaire     Feeling of Stress : Not at all   Housing Stability: Low Risk  (2025)    Housing Stability Vital Sign     Unable to Pay for Housing in the Last Year: No      Number of Times Moved in the Last Year: 1     Homeless in the Last Year: No   [2]   Current Outpatient Medications:     allopurinoL (ZYLOPRIM) 100 MG tablet, , Disp: , Rfl:     aspirin (ECOTRIN) 81 MG EC tablet, Take 1 tablet by mouth Daily., Disp: , Rfl:     atorvastatin (LIPITOR) 10 MG tablet, Take 1 tablet (10 mg total) by mouth every evening., Disp: 90 tablet, Rfl: 4    cetirizine (ZYRTEC) 10 MG tablet, Take 10 mg by mouth once daily., Disp: , Rfl:     chlorhexidine (PERIDEX) 0.12 % solution, SMARTSIG:By Mouth, Disp: , Rfl:     cilostazoL (PLETAL) 100 MG Tab, Take 100 mg by mouth 2 (two) times daily. Pt states taking 1 tab daily., Disp: , Rfl:     diclofenac sodium (VOLTAREN) 1 % Gel, Apply 2 g topically once daily., Disp: 100 g, Rfl: 0    fluticasone propionate (FLONASE) 50 mcg/actuation nasal spray, , Disp: , Rfl:     hydrocortisone valerate (WEST-KIMANI) 0.2 % ointment, 1 Dose once daily., Disp: , Rfl:     ibuprofen (ADVIL,MOTRIN) 800 MG tablet, TAKE 1 TABLET THREE TIMES A DAY AS NEEDED FOR PAIN AND JOINT PAIN, Disp: 90 tablet, Rfl: 11    ipratropium (ATROVENT) 0.03 % nasal spray, USE 2 SPRAYS NASALLY TWICE A DAY, Disp: 90 mL, Rfl: 4    LIDOcaine (LIDODERM) 5 %, 1 patch once daily. PRN, Disp: , Rfl:     lisinopriL (PRINIVIL,ZESTRIL) 20 MG tablet, TAKE 1 TABLET DAILY, Disp: 90 tablet, Rfl: 3    metFORMIN (GLUCOPHAGE) 500 MG tablet, Take 1 tablet (500 mg total) by mouth daily with breakfast., Disp: 90 tablet, Rfl: 4    metoprolol succinate (TOPROL-XL) 50 MG 24 hr tablet, TAKE 1 TABLET DAILY, Disp: 90 tablet, Rfl: 3    NIFEdipine (ADALAT CC) 60 MG TbSR, Take 1 tablet (60 mg total) by mouth once daily., Disp: 90 tablet, Rfl: 4    SUFLAVE 178.7-7.3-0.5 gram SolR, 1 Bottle., Disp: , Rfl:     SYSTANE ULTRA, PF, 0.4-0.3 % Dpet, , Disp: , Rfl:     tadalafiL (CIALIS) 20 MG Tab, Take 1 tablet (20 mg total) by mouth daily as needed (for eectiel dysfunction). Please do Not exceed more than 1 pill a day, Disp: 30 tablet,  Rfl: 4    tamsulosin (FLOMAX) 0.4 mg Cap, Take 1 capsule (0.4 mg total) by mouth every evening., Disp: 90 capsule, Rfl: 3    traZODone (DESYREL) 50 MG tablet, 1 tablet daily as needed., Disp: , Rfl:

## 2025-06-08 ENCOUNTER — PATIENT MESSAGE (OUTPATIENT)
Dept: FAMILY MEDICINE | Facility: CLINIC | Age: 70
End: 2025-06-08
Payer: MEDICARE

## 2025-06-19 DIAGNOSIS — I10 BENIGN ESSENTIAL HYPERTENSION: Primary | ICD-10-CM

## 2025-06-19 DIAGNOSIS — I10 HYPERTENSION, UNSPECIFIED TYPE: ICD-10-CM

## 2025-06-19 DIAGNOSIS — E78.00 HYPERCHOLESTEROLEMIA: ICD-10-CM

## 2025-06-19 NOTE — TELEPHONE ENCOUNTER
Spoke to patient and scheduled him an appt  for 7/9 with Azucena. He is aware that he will need labs before his next visit.

## 2025-06-20 RX ORDER — METOPROLOL SUCCINATE 50 MG/1
50 TABLET, EXTENDED RELEASE ORAL DAILY
Qty: 30 TABLET | Refills: 0 | Status: SHIPPED | OUTPATIENT
Start: 2025-06-20

## 2025-06-23 DIAGNOSIS — Z00.00 ENCOUNTER FOR MEDICARE ANNUAL WELLNESS EXAM: ICD-10-CM

## 2025-06-26 DIAGNOSIS — N52.8 OTHER MALE ERECTILE DYSFUNCTION: ICD-10-CM

## 2025-06-26 RX ORDER — TADALAFIL 20 MG/1
TABLET ORAL
Qty: 30 TABLET | Refills: 3 | Status: SHIPPED | OUTPATIENT
Start: 2025-06-26

## 2025-06-26 NOTE — TELEPHONE ENCOUNTER
Care Due:                  Date            Visit Type   Department     Provider  --------------------------------------------------------------------------------                                EP - [facility] [address]  Last Visit: 05-      CARE ([facility])   FAMILY Nathaniel Mccauleya EP - [facility] [address]  Next Visit: 10-      CARE ([facility])   [facility] Nathaniel  [facility]                                                            Last  Test          Frequency    Reason                     Performed    Due Date  --------------------------------------------------------------------------------    CBC.........  12 months..  ibuprofen................  Not Found    Overdue    [employer] Embedded Care Due Messages. Reference number: [id number].   6/26/2025 2:10:56 AM CDT

## 2025-06-26 NOTE — TELEPHONE ENCOUNTER
Refill Routing Note   Medication(s) are not appropriate for processing by [facility] for the following reason(s):        Outside of protocol    [facility] action(s):  Route               Appointments  past 12m or future 3m with PCP    Date Provider   Last Visit   5/7/2025 Chuckie Alberto MD   Next Visit   10/15/2025 Chuckie Alberto MD   ED visits in past 90 days: 0        Note composed:2:42 AM 06/26/2025

## 2025-06-30 ENCOUNTER — PATIENT MESSAGE (OUTPATIENT)
Dept: FAMILY MEDICINE | Facility: CLINIC | Age: 70
End: 2025-06-30
Payer: MEDICARE

## 2025-07-01 ENCOUNTER — PATIENT OUTREACH (OUTPATIENT)
Dept: ADMINISTRATIVE | Facility: HOSPITAL | Age: 70
End: 2025-07-01
Payer: MEDICARE

## 2025-07-01 NOTE — PROGRESS NOTES
Population Health Chart Review & Patient Outreach Details      Additional [facility] Health Notes:               Updates Requested / Reviewed:      Updated Care Coordination Note, Care Everywhere, , and Immunizations Reconciliation Completed or Queried: [facility] Topics Overdue:      [facility] Score: 1     AAA Screening                       Health Maintenance Topic(s) Outreach Outcomes & Actions Taken:    Colorectal Cancer Screening - Outreach Outcomes & Actions Taken  : External Records Uploaded, Care Team Updated, & History Updated if Applicable

## 2025-07-02 RX ORDER — METFORMIN HYDROCHLORIDE 500 MG/1
500 TABLET ORAL
Qty: 90 TABLET | Refills: 1 | Status: SHIPPED | OUTPATIENT
Start: 2025-07-02

## 2025-07-02 NOTE — TELEPHONE ENCOUNTER
No care due was identified.  Health [facility] Embedded Care Due Messages. Reference number: [id number].   7/02/2025 2:07:10 AM CDT   0 (no pain/absence of nonverbal indicators of pain)

## 2025-07-02 NOTE — TELEPHONE ENCOUNTER
Refill Decision Note   Cipriano Jalyn  is requesting a refill authorization.  Brief Assessment and Rationale for Refill:  Approve     Medication Therapy Plan:         Comments:     Note composed:12:46 PM 07/02/2025

## 2025-08-11 ENCOUNTER — HOSPITAL ENCOUNTER (OUTPATIENT)
Dept: RADIOLOGY | Facility: HOSPITAL | Age: 70
Discharge: HOME | End: 2025-08-11
Attending: NURSE PRACTITIONER
Payer: MEDICARE

## 2025-08-11 ENCOUNTER — OFFICE VISIT (OUTPATIENT)
Dept: FAMILY MEDICINE | Facility: CLINIC | Age: 70
End: 2025-08-11
Payer: MEDICARE

## 2025-08-11 VITALS
SYSTOLIC BLOOD PRESSURE: 128 MMHG | BODY MASS INDEX: 31.12 KG/M2 | OXYGEN SATURATION: 98 % | DIASTOLIC BLOOD PRESSURE: 72 MMHG | HEART RATE: 77 BPM | TEMPERATURE: 98 F | WEIGHT: 235.88 LBS

## 2025-08-11 DIAGNOSIS — M25.462 PAIN AND SWELLING OF LEFT KNEE: ICD-10-CM

## 2025-08-11 DIAGNOSIS — M25.562 PAIN AND SWELLING OF LEFT KNEE: ICD-10-CM

## 2025-08-11 DIAGNOSIS — M25.562 ACUTE PAIN OF LEFT KNEE: ICD-10-CM

## 2025-08-11 DIAGNOSIS — M25.562 ACUTE PAIN OF LEFT KNEE: Primary | ICD-10-CM

## 2025-08-11 PROCEDURE — 99214 OFFICE O/P EST MOD 30 MIN: CPT | Mod: S$PBB,,, | Performed by: NURSE PRACTITIONER

## 2025-08-11 PROCEDURE — 73562 X-RAY EXAM OF KNEE 3: CPT | Mod: 26,LT,, | Performed by: RADIOLOGY

## 2025-08-11 PROCEDURE — 99999 PR PBB SHADOW E&M-EST. PATIENT-LVL IV: CPT | Mod: PBBFAC,,, | Performed by: NURSE PRACTITIONER

## 2025-08-11 PROCEDURE — 99214 OFFICE O/P EST MOD 30 MIN: CPT | Mod: PBBFAC,PN | Performed by: NURSE PRACTITIONER

## 2025-08-11 PROCEDURE — 73562 X-RAY EXAM OF KNEE 3: CPT | Mod: TC,PO,LT

## 2025-08-11 RX ORDER — TRAMADOL HYDROCHLORIDE 50 MG/1
50 TABLET, FILM COATED ORAL EVERY 8 HOURS PRN
Qty: 12 EACH | Refills: 0 | Status: SHIPPED | OUTPATIENT
Start: 2025-08-11

## 2025-08-25 ENCOUNTER — PATIENT MESSAGE (OUTPATIENT)
Dept: FAMILY MEDICINE | Facility: CLINIC | Age: 70
End: 2025-08-25
Payer: MEDICARE

## 2025-08-25 DIAGNOSIS — I10 BENIGN ESSENTIAL HYPERTENSION: ICD-10-CM

## 2025-08-26 RX ORDER — NIFEDIPINE 60 MG/1
60 TABLET, EXTENDED RELEASE ORAL DAILY
Qty: 90 TABLET | Refills: 4 | Status: SHIPPED | OUTPATIENT
Start: 2025-08-26 | End: 2026-11-19

## 2025-08-26 RX ORDER — LISINOPRIL 20 MG/1
20 TABLET ORAL DAILY
Qty: 90 TABLET | Refills: 4 | Status: SHIPPED | OUTPATIENT
Start: 2025-08-26

## 2025-08-26 RX ORDER — ATORVASTATIN CALCIUM 10 MG/1
10 TABLET, FILM COATED ORAL NIGHTLY
Qty: 90 TABLET | Refills: 4 | Status: SHIPPED | OUTPATIENT
Start: 2025-08-26 | End: 2026-11-19

## 2025-08-28 ENCOUNTER — OFFICE VISIT (OUTPATIENT)
Dept: ORTHOPEDICS | Facility: CLINIC | Age: 70
End: 2025-08-28
Payer: MEDICARE

## 2025-08-28 ENCOUNTER — HOSPITAL ENCOUNTER (OUTPATIENT)
Dept: RADIOLOGY | Facility: HOSPITAL | Age: 70
Discharge: HOME | End: 2025-08-28
Attending: ORTHOPAEDIC SURGERY
Payer: MEDICARE

## 2025-08-28 VITALS — HEIGHT: 73 IN | WEIGHT: 235.88 LBS | BODY MASS INDEX: 31.26 KG/M2

## 2025-08-28 DIAGNOSIS — M17.11 PRIMARY OSTEOARTHRITIS OF RIGHT KNEE: ICD-10-CM

## 2025-08-28 DIAGNOSIS — M23.203 DEGENERATIVE TEAR OF MEDIAL MENISCUS OF RIGHT KNEE: Primary | ICD-10-CM

## 2025-08-28 PROCEDURE — 99214 OFFICE O/P EST MOD 30 MIN: CPT | Mod: PBBFAC,25,PN | Performed by: ORTHOPAEDIC SURGERY

## 2025-08-28 PROCEDURE — 99203 OFFICE O/P NEW LOW 30 MIN: CPT | Mod: S$PBB,,, | Performed by: ORTHOPAEDIC SURGERY

## 2025-08-28 PROCEDURE — 73565 X-RAY EXAM OF KNEES: CPT | Mod: 26,,, | Performed by: RADIOLOGY

## 2025-08-28 PROCEDURE — 73565 X-RAY EXAM OF KNEES: CPT | Mod: TC,PN

## 2025-08-28 PROCEDURE — 99999 PR PBB SHADOW E&M-EST. PATIENT-LVL IV: CPT | Mod: PBBFAC,,, | Performed by: ORTHOPAEDIC SURGERY

## 2025-08-29 ENCOUNTER — OFFICE VISIT (OUTPATIENT)
Dept: CARDIOLOGY | Facility: CLINIC | Age: 70
End: 2025-08-29
Payer: MEDICARE

## 2025-08-29 VITALS
WEIGHT: 236.19 LBS | HEART RATE: 95 BPM | HEIGHT: 73 IN | OXYGEN SATURATION: 95 % | SYSTOLIC BLOOD PRESSURE: 122 MMHG | BODY MASS INDEX: 31.3 KG/M2 | DIASTOLIC BLOOD PRESSURE: 78 MMHG

## 2025-08-29 DIAGNOSIS — I49.49 ECTOPIC BEAT: Primary | ICD-10-CM

## 2025-08-29 DIAGNOSIS — G47.33 OSA (OBSTRUCTIVE SLEEP APNEA): ICD-10-CM

## 2025-08-29 DIAGNOSIS — I10 BENIGN ESSENTIAL HYPERTENSION: ICD-10-CM

## 2025-08-29 DIAGNOSIS — E78.5 HYPERLIPIDEMIA, UNSPECIFIED HYPERLIPIDEMIA TYPE: ICD-10-CM

## 2025-08-29 LAB
OHS QRS DURATION: 124 MS
OHS QTC CALCULATION: 464 MS

## 2025-08-29 PROCEDURE — 99214 OFFICE O/P EST MOD 30 MIN: CPT | Mod: PBBFAC,PN

## 2025-08-29 PROCEDURE — 99999 PR PBB SHADOW E&M-EST. PATIENT-LVL IV: CPT | Mod: PBBFAC,,,

## 2025-08-29 RX ORDER — METOPROLOL SUCCINATE 50 MG/1
50 TABLET, EXTENDED RELEASE ORAL DAILY
Qty: 90 TABLET | Refills: 3 | Status: SHIPPED | OUTPATIENT
Start: 2025-08-29